# Patient Record
Sex: FEMALE | Race: WHITE | NOT HISPANIC OR LATINO | Employment: FULL TIME | URBAN - METROPOLITAN AREA
[De-identification: names, ages, dates, MRNs, and addresses within clinical notes are randomized per-mention and may not be internally consistent; named-entity substitution may affect disease eponyms.]

---

## 2018-04-26 ENCOUNTER — OFFICE VISIT (OUTPATIENT)
Dept: URGENT CARE | Facility: CLINIC | Age: 58
End: 2018-04-26
Payer: COMMERCIAL

## 2018-04-26 ENCOUNTER — APPOINTMENT (OUTPATIENT)
Dept: RADIOLOGY | Facility: CLINIC | Age: 58
End: 2018-04-26
Payer: COMMERCIAL

## 2018-04-26 VITALS
DIASTOLIC BLOOD PRESSURE: 85 MMHG | HEART RATE: 110 BPM | TEMPERATURE: 98.1 F | OXYGEN SATURATION: 97 % | RESPIRATION RATE: 18 BRPM | SYSTOLIC BLOOD PRESSURE: 138 MMHG | BODY MASS INDEX: 50.02 KG/M2 | WEIGHT: 293 LBS | HEIGHT: 64 IN

## 2018-04-26 DIAGNOSIS — M54.6 ACUTE THORACIC BACK PAIN, UNSPECIFIED BACK PAIN LATERALITY: ICD-10-CM

## 2018-04-26 DIAGNOSIS — M54.6 ACUTE THORACIC BACK PAIN, UNSPECIFIED BACK PAIN LATERALITY: Primary | ICD-10-CM

## 2018-04-26 PROCEDURE — 99203 OFFICE O/P NEW LOW 30 MIN: CPT

## 2018-04-26 PROCEDURE — 72072 X-RAY EXAM THORAC SPINE 3VWS: CPT

## 2018-04-26 RX ORDER — METOPROLOL TARTRATE 100 MG/1
25 TABLET ORAL DAILY
Status: ON HOLD | COMMUNITY
End: 2019-11-22 | Stop reason: DRUGHIGH

## 2018-04-26 RX ORDER — CYCLOBENZAPRINE HCL 10 MG
10 TABLET ORAL
Qty: 10 TABLET | Refills: 0 | Status: SHIPPED | OUTPATIENT
Start: 2018-04-26 | End: 2019-11-21

## 2018-04-26 RX ORDER — MELOXICAM 15 MG/1
15 TABLET ORAL DAILY
Qty: 21 TABLET | Refills: 0 | Status: SHIPPED | OUTPATIENT
Start: 2018-04-26 | End: 2019-11-21

## 2018-04-26 RX ORDER — LISINOPRIL 10 MG/1
10 TABLET ORAL DAILY
COMMUNITY
End: 2019-11-21

## 2018-04-26 RX ORDER — SIMVASTATIN 20 MG
20 TABLET ORAL
COMMUNITY

## 2018-04-26 RX ORDER — ASPIRIN 81 MG/1
81 TABLET ORAL DAILY
COMMUNITY

## 2018-04-26 NOTE — PATIENT INSTRUCTIONS
Back Pain   WHAT YOU NEED TO KNOW:   Back pain is common  It can be caused by many conditions, such as arthritis or the breakdown of spinal discs  Your risk for back pain is increased by injuries, lack of activity, or repeated bending and twisting  You may feel sore or stiff on one or both sides of your back  The pain may spread to your buttocks or thighs  DISCHARGE INSTRUCTIONS:   Medicines:   · NSAIDs  help decrease swelling and pain  This medicine is available with or without a doctor's order  NSAIDs can cause stomach bleeding or kidney problems in certain people  If you take blood thinner medicine, always ask your healthcare provider if NSAIDs are safe for you  Always read the medicine label and follow directions  · Acetaminophen  decreases pain  It is available without a doctor's order  Ask how much to take and how often to take it  Follow directions  Acetaminophen can cause liver damage if not taken correctly  · Prescription pain medicine  may be given  Ask your healthcare provider how to take this medicine safely  · Take your medicine as directed  Contact your healthcare provider if you think your medicine is not helping or if you have side effects  Tell him or her if you are allergic to any medicine  Keep a list of the medicines, vitamins, and herbs you take  Include the amounts, and when and why you take them  Bring the list or the pill bottles to follow-up visits  Carry your medicine list with you in case of an emergency  Follow up with your healthcare provider in 2 weeks, or as directed:  Write down your questions so you remember to ask them during your visits  How to manage your back pain:   · Apply ice  on your back or affected area for 15 to 20 minutes every hour or as directed  Use an ice pack, or put crushed ice in a plastic bag  Cover it with a towel  Ice helps prevent tissue damage and decreases pain      · Apply heat  on your back or affected area for 20 to 30 minutes every 2 hours for as many days as directed  Heat helps decrease pain and muscle spasms  · Stay active  as much as you can without causing more pain  Bed rest could make your back pain worse  Avoid heavy lifting until your pain is gone  Return to the emergency department if:   · You have pain, numbness, or weakness in one or both legs  · Your pain becomes so severe that you cannot walk  · You cannot control your urine or bowel movements  · You have severe back pain with chest pain  · You have severe back pain, nausea, and vomiting  · You have severe back pain that spreads to your side or genital area  Contact your healthcare provider if:   · You have back pain that does not get better with rest and pain medicine  · You have a fever  · You have pain that worsens when you are on your back or when you rest     · You have pain that worsens when you cough or sneeze  · You lose weight without trying  · You have questions or concerns about your condition or care  © 2017 [employer] Information is for End User's use only and may not be sold, redistributed or otherwise used for commercial purposes  All illustrations and images included in CareNotes® are the copyrighted property of [facility] [employer]  or [employer] Spe[employer]  The above information is an  only  It is not intended as medical advice for individual conditions or treatments  Talk to your doctor, nurse or pharmacist before following any medical regimen to see if it is safe and effective for you

## 2018-04-26 NOTE — PROGRESS NOTES
[facility]        NAME: Nicole Alan is a 62 y o  female  : 1960    MRN: [id number]  DATE: 2018  TIME: 8:11 AM    Assessment and Plan   Acute thoracic back pain, unspecified back pain laterality [M54 6]  1  Acute thoracic back pain, unspecified back pain laterality  XR spine thoracic 3 vw    XR spine lumbar 2 or 3 views injury    meloxicam (MOBIC) 15 mg tablet    cyclobenzaprine (FLEXERIL) 10 mg tablet         Patient Instructions       Follow up with PCP in 3-5 days  Proceed to  ER if symptoms worsen  Chief Complaint     Chief Complaint   Patient presents with    Back Pain     stated elevator closed on her at work on 18  has back pain since  History of Present Illness       Back Pain   This is a new problem  The current episode started today  The problem occurs constantly  The problem is unchanged  The pain is present in the thoracic spine  The quality of the pain is described as aching  The pain does not radiate  The pain is mild  The symptoms are aggravated by twisting and bending  Pertinent negatives include no abdominal pain, bladder incontinence, bowel incontinence, chest pain, dysuria, fever, headaches, leg pain, numbness, paresis, paresthesias, pelvic pain, perianal numbness, tingling, weakness or weight loss  Pt had an elevator door hit x2 against her back  Review of Systems   Review of Systems   Constitutional: Negative for fever and weight loss  Cardiovascular: Negative for chest pain  Gastrointestinal: Negative for abdominal pain and bowel incontinence  Genitourinary: Negative for bladder incontinence, dysuria and pelvic pain  Musculoskeletal: Positive for back pain  Neurological: Negative for tingling, weakness, numbness, headaches and paresthesias  All other systems reviewed and are negative          Current Medications       Current Outpatient Prescriptions:     aspirin (ECOTRIN LOW STRENGTH) 81 mg EC tablet, Take 81 mg by mouth daily, Disp: , Rfl:     Exenatide ER (BYDUREON) 2 MG PEN, Inject 2 mg under the skin once a week, Disp: , Rfl:     lisinopril (ZESTRIL) 10 mg tablet, Take 10 mg by mouth daily, Disp: , Rfl:     metFORMIN (GLUCOPHAGE) 500 mg tablet, Take 500 mg by mouth daily, Disp: , Rfl:     metoprolol tartrate (LOPRESSOR) 100 mg tablet, Take 25 mg by mouth daily, Disp: , Rfl:     simvastatin (ZOCOR) 20 mg tablet, Take 20 mg by mouth daily at bedtime, Disp: , Rfl:     cyclobenzaprine (FLEXERIL) 10 mg tablet, Take 1 tablet (10 mg total) by mouth daily at bedtime, Disp: 10 tablet, Rfl: 0    meloxicam (MOBIC) 15 mg tablet, Take 1 tablet (15 mg total) by mouth daily, Disp: 21 tablet, Rfl: 0    Current Allergies     Allergies as of 04/26/2018 - Reviewed 04/26/2018   Allergen Reaction Noted    Zithromax [azithromycin] Abdominal Pain 04/26/2018    Penicillins Rash 04/26/2018            The following portions of the patient's history were reviewed and updated as appropriate: allergies, current medications, past family history, past medical history, past social history, past surgical history and problem list      Past Medical History:   Diagnosis Date    Diabetes mellitus ([address] )     High cholesterol     Hypertension        Past Surgical History:   Procedure Laterality Date    BOWEL RESECTION      HERNIA REPAIR         No family history on file  Medications have been verified  Objective   /85   Pulse (!) 110   Temp 98 1 °F (36 7 °C) (Tympanic)   Resp 18   Ht 5' 4" (1 626 m)   Wt (!) 145 kg (320 lb)   SpO2 97%   BMI 54 93 kg/m²        Physical Exam     Physical Exam   Constitutional: She appears well-developed and well-nourished  Cardiovascular: Normal rate  Pulmonary/Chest: Effort normal and breath sounds normal    Musculoskeletal: Normal range of motion  She exhibits tenderness (mild tenderness T7-12)  Nursing note and vitals reviewed        Mild spasm along T7-12  Remainder of exam neg

## 2019-05-01 ENCOUNTER — TELEPHONE (OUTPATIENT)
Dept: FAMILY MEDICINE CLINIC | Facility: CLINIC | Age: 59
End: 2019-05-01

## 2019-11-21 ENCOUNTER — HOSPITAL ENCOUNTER (INPATIENT)
Facility: HOSPITAL | Age: 59
LOS: 4 days | Discharge: HOME HEALTH | DRG: 908 | End: 2019-11-26
Attending: EMERGENCY MEDICINE | Admitting: FAMILY MEDICINE
Payer: COMMERCIAL

## 2019-11-21 DIAGNOSIS — L02.211 CUTANEOUS ABSCESS OF ABDOMINAL WALL: ICD-10-CM

## 2019-11-21 DIAGNOSIS — T81.49XA ABDOMINAL WALL ABSCESS AT SITE OF SURGICAL WOUND: Primary | ICD-10-CM

## 2019-11-21 PROCEDURE — 84145 PROCALCITONIN (PCT): CPT | Performed by: EMERGENCY MEDICINE

## 2019-11-21 PROCEDURE — 99285 EMERGENCY DEPT VISIT HI MDM: CPT

## 2019-11-21 PROCEDURE — 99285 EMERGENCY DEPT VISIT HI MDM: CPT | Performed by: EMERGENCY MEDICINE

## 2019-11-21 PROCEDURE — 83036 HEMOGLOBIN GLYCOSYLATED A1C: CPT | Performed by: NURSE PRACTITIONER

## 2019-11-21 PROCEDURE — 87205 SMEAR GRAM STAIN: CPT | Performed by: EMERGENCY MEDICINE

## 2019-11-21 PROCEDURE — 87040 BLOOD CULTURE FOR BACTERIA: CPT | Performed by: EMERGENCY MEDICINE

## 2019-11-21 PROCEDURE — 87070 CULTURE OTHR SPECIMN AEROBIC: CPT | Performed by: EMERGENCY MEDICINE

## 2019-11-21 PROCEDURE — 87086 URINE CULTURE/COLONY COUNT: CPT | Performed by: EMERGENCY MEDICINE

## 2019-11-21 PROCEDURE — 87186 SC STD MICRODIL/AGAR DIL: CPT | Performed by: EMERGENCY MEDICINE

## 2019-11-21 PROCEDURE — 81003 URINALYSIS AUTO W/O SCOPE: CPT | Performed by: EMERGENCY MEDICINE

## 2019-11-21 PROCEDURE — 36415 COLL VENOUS BLD VENIPUNCTURE: CPT | Performed by: EMERGENCY MEDICINE

## 2019-11-21 PROCEDURE — 80053 COMPREHEN METABOLIC PANEL: CPT | Performed by: EMERGENCY MEDICINE

## 2019-11-21 PROCEDURE — 85025 COMPLETE CBC W/AUTO DIFF WBC: CPT | Performed by: EMERGENCY MEDICINE

## 2019-11-21 PROCEDURE — 83605 ASSAY OF LACTIC ACID: CPT | Performed by: EMERGENCY MEDICINE

## 2019-11-21 RX ORDER — PAROXETINE 10 MG/1
10 TABLET, FILM COATED ORAL DAILY
COMMUNITY

## 2019-11-22 ENCOUNTER — APPOINTMENT (EMERGENCY)
Dept: CT IMAGING | Facility: HOSPITAL | Age: 59
DRG: 908 | End: 2019-11-22
Payer: COMMERCIAL

## 2019-11-22 ENCOUNTER — TRANSCRIBE ORDERS (OUTPATIENT)
Dept: LAB | Facility: HOSPITAL | Age: 59
End: 2019-11-22

## 2019-11-22 PROBLEM — L02.211 CUTANEOUS ABSCESS OF ABDOMINAL WALL: Status: ACTIVE | Noted: 2019-11-22

## 2019-11-22 LAB
ALBUMIN SERPL BCP-MCNC: 3.8 G/DL (ref 3.5–5)
ALP SERPL-CCNC: 121 U/L (ref 46–116)
ALT SERPL W P-5'-P-CCNC: 18 U/L (ref 12–78)
ANION GAP SERPL CALCULATED.3IONS-SCNC: 10 MMOL/L (ref 4–13)
ANION GAP SERPL CALCULATED.3IONS-SCNC: 11 MMOL/L (ref 4–13)
AST SERPL W P-5'-P-CCNC: 12 U/L (ref 5–45)
BASOPHILS # BLD AUTO: 0.05 THOUSANDS/ΜL (ref 0–0.1)
BASOPHILS NFR BLD AUTO: 0 % (ref 0–1)
BILIRUB SERPL-MCNC: 0.55 MG/DL (ref 0.2–1)
BILIRUB UR QL STRIP: NEGATIVE
BUN SERPL-MCNC: 15 MG/DL (ref 5–25)
BUN SERPL-MCNC: 18 MG/DL (ref 5–25)
CALCIUM SERPL-MCNC: 9 MG/DL (ref 8.3–10.1)
CALCIUM SERPL-MCNC: 9.5 MG/DL (ref 8.3–10.1)
CHLORIDE SERPL-SCNC: 100 MMOL/L (ref 100–108)
CHLORIDE SERPL-SCNC: 104 MMOL/L (ref 100–108)
CLARITY UR: CLEAR
CO2 SERPL-SCNC: 25 MMOL/L (ref 21–32)
CO2 SERPL-SCNC: 28 MMOL/L (ref 21–32)
COLOR UR: YELLOW
CREAT SERPL-MCNC: 0.56 MG/DL (ref 0.6–1.3)
CREAT SERPL-MCNC: 0.61 MG/DL (ref 0.6–1.3)
EOSINOPHIL # BLD AUTO: 0.1 THOUSAND/ΜL (ref 0–0.61)
EOSINOPHIL NFR BLD AUTO: 1 % (ref 0–6)
ERYTHROCYTE [DISTWIDTH] IN BLOOD BY AUTOMATED COUNT: 14.2 % (ref 11.6–15.1)
ERYTHROCYTE [DISTWIDTH] IN BLOOD BY AUTOMATED COUNT: 14.3 % (ref 11.6–15.1)
EST. AVERAGE GLUCOSE BLD GHB EST-MCNC: 174 MG/DL
GFR SERPL CREATININE-BSD FRML MDRD: 100 ML/MIN/1.73SQ M
GFR SERPL CREATININE-BSD FRML MDRD: 102 ML/MIN/1.73SQ M
GLUCOSE SERPL-MCNC: 115 MG/DL (ref 65–140)
GLUCOSE SERPL-MCNC: 136 MG/DL (ref 65–140)
GLUCOSE SERPL-MCNC: 143 MG/DL (ref 65–140)
GLUCOSE SERPL-MCNC: 148 MG/DL (ref 65–140)
GLUCOSE SERPL-MCNC: 154 MG/DL (ref 65–140)
GLUCOSE SERPL-MCNC: 254 MG/DL (ref 65–140)
GLUCOSE UR STRIP-MCNC: NEGATIVE MG/DL
HBA1C MFR BLD: 7.7 % (ref 4.2–6.3)
HCT VFR BLD AUTO: 29.4 % (ref 34.8–46.1)
HCT VFR BLD AUTO: 35.9 % (ref 34.8–46.1)
HGB BLD-MCNC: 11.2 G/DL (ref 11.5–15.4)
HGB BLD-MCNC: 9.4 G/DL (ref 11.5–15.4)
HGB UR QL STRIP.AUTO: NEGATIVE
IMM GRANULOCYTES # BLD AUTO: 0.09 THOUSAND/UL (ref 0–0.2)
IMM GRANULOCYTES NFR BLD AUTO: 1 % (ref 0–2)
KETONES UR STRIP-MCNC: NEGATIVE MG/DL
LACTATE SERPL-SCNC: 1.4 MMOL/L (ref 0.5–2)
LEUKOCYTE ESTERASE UR QL STRIP: NEGATIVE
LYMPHOCYTES # BLD AUTO: 3.09 THOUSANDS/ΜL (ref 0.6–4.47)
LYMPHOCYTES NFR BLD AUTO: 23 % (ref 14–44)
MAGNESIUM SERPL-MCNC: 1.7 MG/DL (ref 1.6–2.6)
MCH RBC QN AUTO: 26.2 PG (ref 26.8–34.3)
MCH RBC QN AUTO: 26.2 PG (ref 26.8–34.3)
MCHC RBC AUTO-ENTMCNC: 31.2 G/DL (ref 31.4–37.4)
MCHC RBC AUTO-ENTMCNC: 32 G/DL (ref 31.4–37.4)
MCV RBC AUTO: 82 FL (ref 82–98)
MCV RBC AUTO: 84 FL (ref 82–98)
MONOCYTES # BLD AUTO: 1.09 THOUSAND/ΜL (ref 0.17–1.22)
MONOCYTES NFR BLD AUTO: 8 % (ref 4–12)
NEUTROPHILS # BLD AUTO: 9.26 THOUSANDS/ΜL (ref 1.85–7.62)
NEUTS SEG NFR BLD AUTO: 67 % (ref 43–75)
NITRITE UR QL STRIP: NEGATIVE
NRBC BLD AUTO-RTO: 0 /100 WBCS
PH UR STRIP.AUTO: 5 [PH]
PHOSPHATE SERPL-MCNC: 3.8 MG/DL (ref 2.7–4.5)
PLATELET # BLD AUTO: 325 THOUSANDS/UL (ref 149–390)
PLATELET # BLD AUTO: 452 THOUSANDS/UL (ref 149–390)
PMV BLD AUTO: 8.4 FL (ref 8.9–12.7)
PMV BLD AUTO: 8.8 FL (ref 8.9–12.7)
POTASSIUM SERPL-SCNC: 4 MMOL/L (ref 3.5–5.3)
POTASSIUM SERPL-SCNC: 4.1 MMOL/L (ref 3.5–5.3)
PROCALCITONIN SERPL-MCNC: <0.05 NG/ML
PROT SERPL-MCNC: 7.6 G/DL (ref 6.4–8.2)
PROT UR STRIP-MCNC: NEGATIVE MG/DL
RBC # BLD AUTO: 3.59 MILLION/UL (ref 3.81–5.12)
RBC # BLD AUTO: 4.28 MILLION/UL (ref 3.81–5.12)
SODIUM SERPL-SCNC: 139 MMOL/L (ref 136–145)
SODIUM SERPL-SCNC: 139 MMOL/L (ref 136–145)
SP GR UR STRIP.AUTO: 1.02 (ref 1–1.03)
UROBILINOGEN UR QL STRIP.AUTO: 0.2 E.U./DL
WBC # BLD AUTO: 10.43 THOUSAND/UL (ref 4.31–10.16)
WBC # BLD AUTO: 13.68 THOUSAND/UL (ref 4.31–10.16)

## 2019-11-22 PROCEDURE — 83735 ASSAY OF MAGNESIUM: CPT | Performed by: NURSE PRACTITIONER

## 2019-11-22 PROCEDURE — 99223 1ST HOSP IP/OBS HIGH 75: CPT | Performed by: FAMILY MEDICINE

## 2019-11-22 PROCEDURE — 93005 ELECTROCARDIOGRAM TRACING: CPT

## 2019-11-22 PROCEDURE — 85027 COMPLETE CBC AUTOMATED: CPT | Performed by: NURSE PRACTITIONER

## 2019-11-22 PROCEDURE — 82948 REAGENT STRIP/BLOOD GLUCOSE: CPT

## 2019-11-22 PROCEDURE — 84100 ASSAY OF PHOSPHORUS: CPT | Performed by: NURSE PRACTITIONER

## 2019-11-22 PROCEDURE — 80048 BASIC METABOLIC PNL TOTAL CA: CPT | Performed by: NURSE PRACTITIONER

## 2019-11-22 PROCEDURE — 74177 CT ABD & PELVIS W/CONTRAST: CPT

## 2019-11-22 RX ORDER — PAROXETINE HYDROCHLORIDE 20 MG/1
10 TABLET, FILM COATED ORAL DAILY
Status: DISCONTINUED | OUTPATIENT
Start: 2019-11-22 | End: 2019-11-26 | Stop reason: HOSPADM

## 2019-11-22 RX ORDER — SODIUM CHLORIDE 9 MG/ML
75 INJECTION, SOLUTION INTRAVENOUS CONTINUOUS
Status: DISCONTINUED | OUTPATIENT
Start: 2019-11-22 | End: 2019-11-23

## 2019-11-22 RX ORDER — METOPROLOL SUCCINATE 25 MG/1
100 TABLET, EXTENDED RELEASE ORAL DAILY
Status: DISCONTINUED | OUTPATIENT
Start: 2019-11-22 | End: 2019-11-26 | Stop reason: HOSPADM

## 2019-11-22 RX ORDER — ACETAMINOPHEN 325 MG/1
650 TABLET ORAL EVERY 6 HOURS PRN
Status: DISCONTINUED | OUTPATIENT
Start: 2019-11-22 | End: 2019-11-26 | Stop reason: HOSPADM

## 2019-11-22 RX ORDER — PRAVASTATIN SODIUM 20 MG
20 TABLET ORAL
Status: DISCONTINUED | OUTPATIENT
Start: 2019-11-22 | End: 2019-11-26 | Stop reason: HOSPADM

## 2019-11-22 RX ORDER — ONDANSETRON 2 MG/ML
4 INJECTION INTRAMUSCULAR; INTRAVENOUS EVERY 6 HOURS PRN
Status: DISCONTINUED | OUTPATIENT
Start: 2019-11-22 | End: 2019-11-26 | Stop reason: HOSPADM

## 2019-11-22 RX ORDER — HYDRALAZINE HYDROCHLORIDE 20 MG/ML
5 INJECTION INTRAMUSCULAR; INTRAVENOUS EVERY 6 HOURS PRN
Status: DISCONTINUED | OUTPATIENT
Start: 2019-11-22 | End: 2019-11-26 | Stop reason: HOSPADM

## 2019-11-22 RX ORDER — METOPROLOL TARTRATE 50 MG/1
25 TABLET, FILM COATED ORAL DAILY
Status: DISCONTINUED | OUTPATIENT
Start: 2019-11-22 | End: 2019-11-22

## 2019-11-22 RX ORDER — METOPROLOL SUCCINATE 100 MG/1
100 TABLET, EXTENDED RELEASE ORAL DAILY
COMMUNITY
Start: 2019-10-01

## 2019-11-22 RX ADMIN — VANCOMYCIN HYDROCHLORIDE 1750 MG: 1 INJECTION, POWDER, LYOPHILIZED, FOR SOLUTION INTRAVENOUS at 17:32

## 2019-11-22 RX ADMIN — METOPROLOL SUCCINATE 100 MG: 25 TABLET, EXTENDED RELEASE ORAL at 09:01

## 2019-11-22 RX ADMIN — METRONIDAZOLE 500 MG: 500 INJECTION, SOLUTION INTRAVENOUS at 19:46

## 2019-11-22 RX ADMIN — INSULIN LISPRO 2 UNITS: 100 INJECTION, SOLUTION INTRAVENOUS; SUBCUTANEOUS at 16:51

## 2019-11-22 RX ADMIN — VANCOMYCIN HYDROCHLORIDE 1750 MG: 1 INJECTION, POWDER, LYOPHILIZED, FOR SOLUTION INTRAVENOUS at 07:36

## 2019-11-22 RX ADMIN — SODIUM CHLORIDE 75 ML/HR: 0.9 INJECTION, SOLUTION INTRAVENOUS at 04:16

## 2019-11-22 RX ADMIN — PRAVASTATIN SODIUM 20 MG: 20 TABLET ORAL at 16:51

## 2019-11-22 RX ADMIN — ACETAMINOPHEN 650 MG: 325 TABLET, FILM COATED ORAL at 03:57

## 2019-11-22 RX ADMIN — Medication 2000 MG: at 12:47

## 2019-11-22 RX ADMIN — Medication 2000 MG: at 20:27

## 2019-11-22 RX ADMIN — Medication 2000 MG: at 05:41

## 2019-11-22 RX ADMIN — IOHEXOL 100 ML: 350 INJECTION, SOLUTION INTRAVENOUS at 00:52

## 2019-11-22 RX ADMIN — METRONIDAZOLE 500 MG: 500 INJECTION, SOLUTION INTRAVENOUS at 05:25

## 2019-11-22 RX ADMIN — PAROXETINE 10 MG: 20 TABLET, FILM COATED ORAL at 09:02

## 2019-11-22 RX ADMIN — METRONIDAZOLE 500 MG: 500 INJECTION, SOLUTION INTRAVENOUS at 11:43

## 2019-11-22 NOTE — PROGRESS NOTES
Vancomycin Assessment    Pb Mohr is a 61 y o  female who is currently receiving vancomycin 2750 mg iv q 12hours for skin-soft tissue infection, other abdominal abscess with possible enterocutaneus fistula   Relevant clinical data and objective history reviewed:  Creatinine   Date Value Ref Range Status   11/21/2019 0 61 0 60 - 1 30 mg/dL Final     Comment:     Standardized to IDMS reference method     BP (!) 173/84 (BP Location: Right arm)   Pulse 86   Temp 98 1 °F (36 7 °C) (Oral)   Resp 18   Ht 5' 7" (1 702 m)   Wt (!) 138 kg (303 lb 6 4 oz)   SpO2 96%   BMI 47 52 kg/m²   No intake/output data recorded  Lab Results   Component Value Date/Time    BUN 18 11/21/2019 11:34 PM    WBC 13 68 (H) 11/21/2019 11:34 PM    HGB 11 2 (L) 11/21/2019 11:34 PM    HCT 35 9 11/21/2019 11:34 PM    MCV 84 11/21/2019 11:34 PM     (H) 11/21/2019 11:34 PM     Temp Readings from Last 3 Encounters:   11/22/19 98 1 °F (36 7 °C) (Oral)   04/26/18 98 1 °F (36 7 °C) (Tympanic)     Vancomycin Days of Therapy: 1    Assessment/Plan  The patient is currently on vancomycin utilizing scheduled dosing based on adjusted body weight (due to obesity)  Baseline risks associated with therapy include: concomitant nephrotoxic medications and dehydration  The patient is currently receiving 2750 mg iv q 12hours and after clinical evaluation will be changed to 1750 mg iv q 12 hours  Pharmacy will also follow closely for s/sx of nephrotoxicity, infusion reactions and appropriateness of therapy  BMP and CBC will be ordered per protocol  Plan for trough as patient approaches steady state, prior to the 4th  dose at approximately 17:30 on 11/23/2019  Due to infection severity, will target a trough of 15-20 (appropriate for most indications)   Pharmacy will continue to follow the patients culture results and clinical progress daily      Diego Mathew, Pharmacist

## 2019-11-22 NOTE — CONSULTS
Consultation - General Surgery   Gail Carrel 61 y o  female MRN: [id number]  Unit/Bed#: -01 Encounter: [id number]    Assessment/Plan     Assessment:  - Draining abdominal wound, Imaging shows concern for "fluid and air collection measuring 5 2 x 3 2 x 4 6 cm with adjacent fat stranding and skin thickening likely due to an abscess with overlying cellulitis"  Concern for enterocutaneous fistula  - Leukocytosis (WBC 13 86) on admission, now 10 43  - Lactic acid 1 4  - Gram stain showing 2+ polys and 1+ GNR  - Morbid obesity  - Diabetes mellitus    Plan:  - No acute surgical intervention, once acute infection has resolved/improved the patient should follow with her primary surgeon as previously planned  - Continue antibiotics per primary service  - Monitor qAM labs to include CBC and BMP  - IVF hydration  - Monitor I/Os  - Follow blood and wound cultures  - Medicate PRN pain  - Dry dressing change to wound daily and PRN  - Recommend dietary changes to promote weight loss and glucose control      History of Present Illness     HPI:  Gail Carrel is a 61 y o  female who presents with 3 days of worsening abdominal wall tenderness, erythema and drainage from a chronic wound  She states that she has been diagnosed with multiple ventral hernias  She has history of multiple abdominal surgeries including laparoscopic cholecystectomy, bowel resection  Her surgeon was planning to send her to [facility] to undergo an abdominal wall reconstruction due to loss of domain  Before this could be arranged, she suffered a spider bite to the periumbilical area in June of 2019  The bite becam infected and she was admitted to [facility] in [address] [place] [address] with a diagnosis of sepsis  Her abscess was surgically drained by General Surgeon at that time and she did well postoperatively for approximately 6 weeks  At that time she noticed a small draining area with clear drainage without foul odor   She was last seen by her general surgeon approximately 2 months ago  She saw her PCP, Dr Yanet Durham, on 11/19  At that time he advised her to be seen by her surgeon  She did not schedule an appointment at that time  Since that visit her abdomen has become more tender, erythematous, and the discharge is now copious and purulent  She states she may have been feverish on one occasion but she is not sure  She denies bowel or bladder complaints  She has been tolerating PO intake without N/V  She presented to the [facility] ED last evening  Workup in the ED revealed a leukocytosis (WBC 13 86), and CT scan of the abdomen/pelvis revealed fluid collection, abdominal wall cellulitis, and possible enterocutaneous fistula  Inpatient consult to Acute Care Surgery  Consult performed by: Casimiro Lao PA-C  Consult ordered by: YAMILE Davidson        Review of Systems   Constitutional: Negative for chills, diaphoresis and fatigue  Subjective fever on one occassion   HENT: Negative  Eyes: Negative  Respiratory: Negative  Cardiovascular: Negative  Gastrointestinal: Negative for abdominal distention, blood in stool, constipation, nausea and vomiting  Endocrine: Negative  Genitourinary: Negative  Musculoskeletal: Negative  Skin: Negative  5 months with a draining wound in periumbilical region  Allergic/Immunologic: Negative  Neurological: Negative  Hematological: Negative  Psychiatric/Behavioral: Negative  All other systems reviewed and are negative        Historical Information   Past Medical History:   Diagnosis Date    Diabetes mellitus ([address] )     High cholesterol     Hypertension     Psychiatric disorder      Past Surgical History:   Procedure Laterality Date    BOWEL RESECTION      CHOLECYSTECTOMY LAPAROSCOPIC      HERNIA REPAIR       Social History   Social History     Substance and Sexual Activity   Alcohol Use Yes    Comment: rarely     Social History     Substance and Sexual Activity   Drug Use Never     Social History     Tobacco Use   Smoking Status Never Smoker   Smokeless Tobacco Never Used     Family History: non-contributory    Meds/Allergies   current meds:   Current Facility-Administered Medications   Medication Dose Route Frequency    acetaminophen (TYLENOL) tablet 650 mg  650 mg Oral Q6H PRN    vancomycin (VANCOCIN) 1,750 mg in sodium chloride 0 9 % 500 mL IVPB  20 mg/kg (Adjusted) Intravenous Q12H    And    cefepime (MAXIPIME) 2,000 mg in dextrose 5 % 50 mL IVPB  2,000 mg Intravenous Q8H    hydrALAZINE (APRESOLINE) injection 5 mg  5 mg Intravenous Q6H PRN    insulin lispro (HumaLOG) 100 units/mL subcutaneous injection 1-5 Units  1-5 Units Subcutaneous TID AC    insulin lispro (HumaLOG) 100 units/mL subcutaneous injection 1-5 Units  1-5 Units Subcutaneous HS    metoprolol succinate (TOPROL-XL) 24 hr tablet 100 mg  100 mg Oral Daily    metroNIDAZOLE (FLAGYL) IVPB (premix) 500 mg  500 mg Intravenous Q8H    ondansetron (ZOFRAN) injection 4 mg  4 mg Intravenous Q6H PRN    PARoxetine (PAXIL) tablet 10 mg  10 mg Oral Daily    pravastatin (PRAVACHOL) tablet 20 mg  20 mg Oral Daily With Dinner    sodium chloride 0 9 % infusion  75 mL/hr Intravenous Continuous     Allergies   Allergen Reactions    Zithromax [Azithromycin] Abdominal Pain    Penicillins Rash       Objective   First Vitals:   Blood Pressure: (!) 229/100 (11/21/19 2321)  Pulse: 90 (11/21/19 2321)  Temperature: 98 1 °F (36 7 °C) (11/21/19 2332)  Temp Source: Oral (11/21/19 2332)  Respirations: 20 (11/21/19 2321)  Height: 5' 7" (170 2 cm) (11/22/19 0204)  Weight - Scale: (!) 138 kg (303 lb 6 4 oz) (11/22/19 0045)  SpO2: 99 % (11/21/19 2320)    Current Vitals:   Blood Pressure: 147/73 (11/22/19 0717)  Pulse: 90 (11/22/19 0716)  Temperature: 98 2 °F (36 8 °C) (11/22/19 0716)  Temp Source: Oral (11/22/19 0716)  Respirations: 18 (11/22/19 3416)  Height: 5' 7" (170 2 cm) (11/22/19 6334)  Weight - Scale: (!) 138 kg (303 lb 6 4 oz) (11/22/19 0204)  SpO2: 96 % (11/22/19 0716)      Intake/Output Summary (Last 24 hours) at 11/22/2019 1310  Last data filed at 11/22/2019 1306  Gross per 24 hour   Intake 278 34 ml   Output --   Net 278 34 ml       Invasive Devices     Peripheral Intravenous Line            Peripheral IV 11/21/19 Left Antecubital less than 1 day    Peripheral IV 11/21/19 Right Antecubital less than 1 day                Physical Exam   Constitutional: She is oriented to person, place, and time  No distress  Morbidly obese female   HENT:   Head: Normocephalic and atraumatic  Right Ear: External ear normal    Left Ear: External ear normal    Mouth/Throat: Oropharynx is clear and moist    Eyes: Pupils are equal, round, and reactive to light  Conjunctivae and EOM are normal  Right eye exhibits no discharge  Left eye exhibits no discharge  Neck: Normal range of motion  Neck supple  Cardiovascular: Normal rate, regular rhythm, normal heart sounds and intact distal pulses  Pulmonary/Chest: Effort normal and breath sounds normal  No respiratory distress  Abdominal: Soft  Bowel sounds are normal  She exhibits no distension  There is no guarding  Draining erythematous periumbilical wound, purulent drainage present  minimal tenderness to touch  Multiple ventral hernias present and not reducible  Musculoskeletal: Normal range of motion  Neurological: She is alert and oriented to person, place, and time  No cranial nerve deficit  Skin: Skin is warm and dry  Capillary refill takes less than 2 seconds  She is not diaphoretic  Psychiatric: She has a normal mood and affect  Her behavior is normal  Judgment and thought content normal    Vitals reviewed  Lab Results:   I have personally reviewed pertinent lab results      CBC:   Lab Results   Component Value Date    WBC 10 43 (H) 11/22/2019    HGB 9 4 (L) 11/22/2019    HCT 29 4 (L) 11/22/2019    MCV 82 11/22/2019     11/22/2019    MCH 26 2 (L) 11/22/2019    MCHC 32 0 11/22/2019    RDW 14 2 11/22/2019    MPV 8 4 (L) 11/22/2019    NRBC 0 11/21/2019     CMP:   Lab Results   Component Value Date    SODIUM 139 11/22/2019    K 4 0 11/22/2019     11/22/2019    CO2 25 11/22/2019    BUN 15 11/22/2019    CREATININE 0 56 (L) 11/22/2019    CALCIUM 9 0 11/22/2019    AST 12 11/21/2019    ALT 18 11/21/2019    ALKPHOS 121 (H) 11/21/2019    EGFR 102 11/22/2019         Imaging: I have personally reviewed pertinent reports  EKG, Pathology, and Other Studies: I have personally reviewed pertinent reports  Counseling / Coordination of Care  Total floor / unit time spent today 40 minutes  Greater than 50% of total time was spent with the patient and / or family counseling and / or coordination of care  A description of the counseling / coordination of care: etiology of condition, review of imaging results, lab results, and treatment options        Judi Kapadai PA-C

## 2019-11-22 NOTE — PLAN OF CARE
Problem: PAIN - ADULT  Goal: Verbalizes/displays adequate comfort level or baseline comfort level  Description  Interventions:  - Encourage patient to monitor pain and request assistance  - Assess pain using appropriate pain scale  - Administer analgesics based on type and severity of pain and evaluate response  - Implement non-pharmacological measures as appropriate and evaluate response  - Consider cultural and social influences on pain and pain management  - Notify physician/advanced practitioner if interventions unsuccessful or patient reports new pain  Outcome: Progressing     Problem: INFECTION - ADULT  Goal: Absence or prevention of progression during hospitalization  Description  INTERVENTIONS:  - Assess and monitor for signs and symptoms of infection  - Monitor lab/diagnostic results  - Monitor all insertion sites, i e  indwelling lines, tubes, and drains  - Monitor endotracheal if appropriate and nasal secretions for changes in amount and color  - [place] appropriate cooling/warming therapies per order  - Administer medications as ordered  - Instruct and encourage patient and family to use good hand hygiene technique  - Identify and instruct in appropriate isolation precautions for identified infection/condition  Outcome: Progressing  Goal: Absence of fever/infection during neutropenic period  Description  INTERVENTIONS:  - Monitor WBC    Outcome: Progressing     Problem: SAFETY ADULT  Goal: Patient will remain free of falls  Description  INTERVENTIONS:  - Assess patient frequently for physical needs  -  Identify cognitive and physical deficits and behaviors that affect risk of falls    -  [place] fall precautions as indicated by assessment   - Educate patient/family on patient safety including physical limitations  - Instruct patient to call for assistance with activity based on assessment  - Modify environment to reduce risk of injury  - Consider OT/PT consult to assist with strengthening/mobility  Outcome: Progressing  Goal: Maintain or return to baseline ADL function  Description  INTERVENTIONS:  -  Assess patient's ability to carry out ADLs; assess patient's baseline for ADL function and identify physical deficits which impact ability to perform ADLs (bathing, care of mouth/teeth, toileting, grooming, dressing, etc )  - Assess/evaluate cause of self-care deficits   - Assess range of motion  - Assess patient's mobility; develop plan if impaired  - Assess patient's need for assistive devices and provide as appropriate  - Encourage maximum independence but intervene and supervise when necessary  - Involve family in performance of ADLs  - Assess for home care needs following discharge   - Consider OT consult to assist with ADL evaluation and planning for discharge  - Provide patient education as appropriate  Outcome: Progressing  Goal: Maintain or return mobility status to optimal level  Description  INTERVENTIONS:  - Assess patient's baseline mobility status (ambulation, transfers, stairs, etc )    - Identify cognitive and physical deficits and behaviors that affect mobility  - Identify mobility aids required to assist with transfers and/or ambulation (gait belt, sit-to-stand, lift, walker, cane, etc )  - [place] fall precautions as indicated by assessment  - Record patient progress and toleration of activity level on Mobility SBAR; progress patient to next Phase/Stage  - Instruct patient to call for assistance with activity based on assessment  - Consider rehabilitation consult to assist with strengthening/weightbearing, etc   Outcome: Progressing     Problem: SAFETY ADULT  Goal: Maintain or return to baseline ADL function  Description  INTERVENTIONS:  -  Assess patient's ability to carry out ADLs; assess patient's baseline for ADL function and identify physical deficits which impact ability to perform ADLs (bathing, care of mouth/teeth, toileting, grooming, dressing, etc )  - Assess/evaluate cause of self-care deficits   - Assess range of motion  - Assess patient's mobility; develop plan if impaired  - Assess patient's need for assistive devices and provide as appropriate  - Encourage maximum independence but intervene and supervise when necessary  - Involve family in performance of ADLs  - Assess for home care needs following discharge   - Consider OT consult to assist with ADL evaluation and planning for discharge  - Provide patient education as appropriate  Outcome: Progressing     Problem: SAFETY ADULT  Goal: Maintain or return mobility status to optimal level  Description  INTERVENTIONS:  - Assess patient's baseline mobility status (ambulation, transfers, stairs, etc )    - Identify cognitive and physical deficits and behaviors that affect mobility  - Identify mobility aids required to assist with transfers and/or ambulation (gait belt, sit-to-stand, lift, walker, cane, etc )  - [place] fall precautions as indicated by assessment  - Record patient progress and toleration of activity level on Mobility SBAR; progress patient to next Phase/Stage  - Instruct patient to call for assistance with activity based on assessment  - Consider rehabilitation consult to assist with strengthening/weightbearing, etc   Outcome: Progressing     Problem: DISCHARGE PLANNING  Goal: Discharge to home or other facility with appropriate resources  Description  INTERVENTIONS:  - Identify barriers to discharge w/patient and caregiver  - Arrange for needed discharge resources and transportation as appropriate  - Identify discharge learning needs (meds, wound care, etc )  - Arrange for interpretive services to assist at discharge as needed  - Refer to Case Management Department for coordinating discharge planning if the patient needs post-hospital services based on physician/advanced practitioner order or complex needs related to functional status, cognitive ability, or social support system  Outcome: Progressing     Problem: Knowledge Deficit  Goal: Patient/family/caregiver demonstrates understanding of disease process, treatment plan, medications, and discharge instructions  Description  Complete learning assessment and assess knowledge base    Interventions:  - Provide teaching at level of understanding  - Provide teaching via preferred learning methods  Outcome: Progressing

## 2019-11-22 NOTE — ASSESSMENT & PLAN NOTE
· History of SVT, status post ablation 5 years ago at [facility]  · Maintained on Toprol XL  · Will obtain EKG preoperatively  · Denies symptoms of palpitations

## 2019-11-22 NOTE — ASSESSMENT & PLAN NOTE
· Continue statin therapy with Pravachol 20 mg daily  · Follow-up with primary care provider regarding lipid management

## 2019-11-22 NOTE — ASSESSMENT & PLAN NOTE
· Present on admission  · History abdominal wall abscess and cellulitis June 2019 status post treatment by General surgery with formal drainage and washout at [facility]  · Presents with 5 months of discharge from small hole than abdominal wall and over the past 3 days development of erythema, edema, severe tenderness and purulent discharge  · CT- IMPRESSION:  Very large multiloculated ventral abdominal hernia containing multiple loops of nonobstructed small large bowel  Between 3 abdominal wall hernia lobules there is a subcutaneous fluid and air collection measuring 5 2 x 3 2 x 4 6 cm with adjacent fat stranding and skin thickening likely due to an abscess with overlying cellulitis        · Patient also with leukocytosis, blood and wound cultures are pending  · Trend CBC, procalcitonin, initiate broad-spectrum antibiotic coverage in this diabetic female with recurrent abdominal abscess and possible enterocutaneous fistula will utilize vancomycin, cefepime, Flagyl  · Consultation to General surgery for possible drainage of abscess later today, we will keep patient NPO

## 2019-11-22 NOTE — SOCIAL WORK
CM met with the patient at bedside to review the CM role and discuss possible dc needs  At time of interview pt is AAOx4 lives in a two story home with her   Pt was IPTA  Pt has no DME  Pt has bathroom on both floors, bedroom on second floor with full flight os steps  Pt denies any history of drug/etoh abuse, mental illness or inpatient psych admissions  Pt denies any history of SNF/Rehab or VNA  Pt does not have a POA/LW  Pt is an employee at [employer] in [address] [place] [address]  Preferred Pharmacy: [place], [place]  Contact: Corie Kerr, , [phone]  PCP: Dr Rashid Maldonado    CM reviewed d/c planning process including the following: identifying help at home, patient preference for d/c planning needs, availability of treatment team to discuss questions or concerns patient and/or family may have regarding understanding medications and recognizing signs and symptoms once discharged  CM also encouraged patient to follow up with all recommended appointments after discharge  Patient advised of importance for patient and family to participate in managing patients medical well being

## 2019-11-22 NOTE — ED PROVIDER NOTES
History  Chief Complaint   Patient presents with    Wound Infection     patient reports infected wound to abdomen  Patient is a 48 old female presenting the emergency today complaining of subjective fever wound dehiscence with active drainage to lower abdomen, reports a history of abdominal hernia repair performed in June this year, she initially had wound dehiscence which became infected, the infection cleared up and the wound closed, she now noticed some drainage when she woke up this morning with increased redness and pain to the area, she denies a cough, cold or congestion she does report some dysuria, surgery was performed [facility] in [place]          Prior to Admission Medications   Prescriptions Last Dose Informant Patient Reported? Taking? Exenatide ER (BYDUREON) 2 MG PEN Past Week at Unknown time  Yes Yes   Sig: Inject 2 mg under the skin once a week   PARoxetine (PAXIL) 10 mg tablet 11/21/2019 at Unknown time  Yes Yes   Sig: Take 10 mg by mouth daily   aspirin (ECOTRIN LOW STRENGTH) 81 mg EC tablet 11/21/2019 at Unknown time  Yes Yes   Sig: Take 81 mg by mouth daily   metFORMIN (GLUCOPHAGE) 500 mg tablet 11/21/2019 at Unknown time  Yes Yes   Sig: Take 500 mg by mouth daily   metoprolol tartrate (LOPRESSOR) 100 mg tablet 11/21/2019 at Unknown time  Yes Yes   Sig: Take 25 mg by mouth daily   simvastatin (ZOCOR) 20 mg tablet 11/21/2019 at Unknown time  Yes Yes   Sig: Take 20 mg by mouth daily at bedtime      Facility-Administered Medications: None       Past Medical History:   Diagnosis Date    Diabetes mellitus ([facility] [address] )     High cholesterol     Hypertension        Past Surgical History:   Procedure Laterality Date    BOWEL RESECTION      HERNIA REPAIR         History reviewed  No pertinent family history  I have reviewed and agree with the history as documented      Social History     Tobacco Use    Smoking status: Never Smoker    Smokeless tobacco: Never Used   Substance Use Topics    Alcohol use: Yes     Comment: rarely    Drug use: Never        Review of Systems   Constitutional: Positive for chills and fever  HENT: Negative for ear pain, hearing loss, sore throat, trouble swallowing and voice change  Eyes: Negative for pain and discharge  Respiratory: Negative for cough, shortness of breath and wheezing  Cardiovascular: Negative for chest pain and palpitations  Gastrointestinal: Negative for abdominal pain, blood in stool, constipation, diarrhea, nausea and vomiting  Genitourinary: Positive for dysuria  Negative for flank pain, frequency and hematuria  Musculoskeletal: Negative for joint swelling, neck pain and neck stiffness  Skin: Positive for wound  Negative for rash  Neurological: Negative for dizziness, seizures, syncope, facial asymmetry and headaches  Psychiatric/Behavioral: Negative for hallucinations, self-injury and suicidal ideas  All other systems reviewed and are negative  Physical Exam  Physical Exam   Constitutional: She is oriented to person, place, and time  Vital signs are normal  She appears well-developed and well-nourished  She is active  HENT:   Head: Normocephalic and atraumatic  Eyes: Pupils are equal, round, and reactive to light  Conjunctivae, EOM and lids are normal    Neck: Trachea normal and normal range of motion  Neck supple  Cardiovascular: Normal rate and regular rhythm  Pulmonary/Chest: Effort normal and breath sounds normal    Abdominal: Soft  Normal appearance and bowel sounds are normal        A small area of wound dehiscence measuring approximately 2 cm, active purulence drainage, surrounding erythema and tenderness   Musculoskeletal: Normal range of motion  Neurological: She is alert and oriented to person, place, and time  She has normal strength  No cranial nerve deficit or sensory deficit  Skin: Skin is warm and dry  Capillary refill takes less than 2 seconds  Psychiatric: She has a normal mood and affect  Her behavior is normal  Thought content normal        Vital Signs  ED Triage Vitals   Temperature Pulse Respirations Blood Pressure SpO2   11/21/19 2332 11/21/19 2321 11/21/19 2321 11/21/19 2321 11/21/19 2321   98 1 °F (36 7 °C) 90 20 (!) 229/100 99 %      Temp Source Heart Rate Source Patient Position - Orthostatic VS BP Location FiO2 (%)   11/21/19 2332 -- 11/21/19 2321 11/21/19 2321 --   Oral  Sitting Right arm       Pain Score       11/21/19 2355       5           Vitals:    11/21/19 2321 11/21/19 2355 11/22/19 0135 11/22/19 0204   BP: (!) 229/100 164/69 (!) 180/80 (!) 173/84   Pulse: 90 84 87 86   Patient Position - Orthostatic VS: Sitting Lying Lying Lying               ED Medications  Medications   acetaminophen (TYLENOL) tablet 650 mg (has no administration in time range)   metoprolol tartrate (LOPRESSOR) tablet 25 mg (has no administration in time range)   PARoxetine (PAXIL) tablet 10 mg (has no administration in time range)   pravastatin (PRAVACHOL) tablet 20 mg (has no administration in time range)   sodium chloride 0 9 % infusion (has no administration in time range)   ondansetron (ZOFRAN) injection 4 mg (has no administration in time range)   metroNIDAZOLE (FLAGYL) IVPB (premix) 500 mg (has no administration in time range)   vancomycin (VANCOCIN) 1,750 mg in sodium chloride 0 9 % 500 mL IVPB (has no administration in time range)     And   cefepime (MAXIPIME) 2,000 mg in dextrose 5 % 50 mL IVPB (has no administration in time range)   insulin lispro (HumaLOG) 100 units/mL subcutaneous injection 1-5 Units (has no administration in time range)   insulin lispro (HumaLOG) 100 units/mL subcutaneous injection 1-5 Units (has no administration in time range)   iohexol (OMNIPAQUE) 350 MG/ML injection (SINGLE-DOSE) 100 mL (100 mL Intravenous Given 11/22/19 0052)       Diagnostic Studies  Results Reviewed     Procedure Component Value Units Date/Time    Lactic acid, plasma x2 [[id number]]  (Normal) Collected: 11/21/19 2355    Lab Status:  Final result Specimen:  Blood Updated:  11/22/19 0021     LACTIC ACID 1 4 mmol/L     Narrative:       Result may be elevated if tourniquet was used during collection      Comprehensive metabolic panel [[id number]]  (Abnormal) Collected:  11/21/19 2334    Lab Status:  Final result Specimen:  Blood from Arm, Right Updated:  11/22/19 0015     Sodium 139 mmol/L      Potassium 4 1 mmol/L      Chloride 100 mmol/L      CO2 28 mmol/L      ANION GAP 11 mmol/L      BUN 18 mg/dL      Creatinine 0 61 mg/dL      Glucose 154 mg/dL      Calcium 9 5 mg/dL      AST 12 U/L      ALT 18 U/L      Alkaline Phosphatase 121 U/L      Total Protein 7 6 g/dL      Albumin 3 8 g/dL      Total Bilirubin 0 55 mg/dL      eGFR 100 ml/min/1 73sq m     Narrative:       National Kidney Disease Foundation guidelines for Chronic Kidney Disease (CKD):     Stage 1 with normal or high GFR (GFR > 90 mL/min/1 73 square meters)    Stage 2 Mild CKD (GFR = 60-89 mL/min/1 73 square meters)    Stage 3A Moderate CKD (GFR = 45-59 mL/min/1 73 square meters)    Stage 3B Moderate CKD (GFR = 30-44 mL/min/1 73 square meters)    Stage 4 Severe CKD (GFR = 15-29 mL/min/1 73 square meters)    Stage 5 End Stage CKD (GFR <15 mL/min/1 73 square meters)  Note: GFR calculation is accurate only with a steady state creatinine    CBC and differential [[id number]]  (Abnormal) Collected:  11/21/19 2334    Lab Status:  Final result Specimen:  Blood from Arm, Right Updated:  11/22/19 0013     WBC 13 68 Thousand/uL      RBC 4 28 Million/uL      Hemoglobin 11 2 g/dL      Hematocrit 35 9 %      MCV 84 fL      MCH 26 2 pg      MCHC 31 2 g/dL      RDW 14 3 %      MPV 8 8 fL      Platelets 661 Thousands/uL      nRBC 0 /100 WBCs      Neutrophils Relative 67 %      Immat GRANS % 1 %      Lymphocytes Relative 23 %      Monocytes Relative 8 %      Eosinophils Relative 1 %      Basophils Relative 0 %      Neutrophils Absolute 9 26 Thousands/µL      Immature Grans Absolute 0 09 Thousand/uL      Lymphocytes Absolute 3 09 Thousands/µL      Monocytes Absolute 1 09 Thousand/µL      Eosinophils Absolute 0 10 Thousand/µL      Basophils Absolute 0 05 Thousands/µL     UA w Reflex to Microscopic w Reflex to Culture [[id number]] Collected:  11/21/19 2345    Lab Status:  Final result Specimen:  Urine, Clean Catch Updated:  11/22/19 0003     Color, UA Yellow     Clarity, UA Clear     Specific Gravity, UA 1 025     pH, UA 5 0     Leukocytes, UA Negative     Nitrite, UA Negative     Protein, UA Negative mg/dl      Glucose, UA Negative mg/dl      Ketones, UA Negative mg/dl      Urobilinogen, UA 0 2 E U /dl      Bilirubin, UA Negative     Blood, UA Negative    Urine culture [[id number]] Collected:  11/21/19 2345    Lab Status: In process Specimen:  Urine, Clean Catch Updated:  11/21/19 2355    Procalcitonin [[id number]] Collected:  11/21/19 2334    Lab Status: In process Specimen:  Blood from Arm, Right Updated:  11/21/19 2355    Blood culture #1 [[id number]] Collected:  11/21/19 2342    Lab Status: In process Specimen:  Blood from Arm, Left Updated:  11/21/19 2352    Blood culture #2 [[id number]] Collected:  11/21/19 2334    Lab Status: In process Specimen:  Blood from Arm, Right Updated:  11/21/19 2352    Wound culture and Gram stain [[id number]] Collected:  11/21/19 2345    Lab Status: In process Specimen:  Wound from Abdominal Updated:  11/21/19 2352    Lactic acid, plasma x2 [[id number]] Collected:  11/21/19 2333    Lab Status:  No result Specimen:  Blood from Arm, Right                  CT abdomen pelvis with contrast   Final Result by Mack Oconnor MD (11/22 0120)      1  Very large multilobulated ventral abdominal wall hernia containing multiple loops of nonobstructed small and large bowel     2   Between three abdominal wall hernia lobules there is a subcutaneous fluid and air collection measuring 5 2 x 3 2 x 4 6 cm with adjacent fat stranding and skin thickening likely due to an abscess with overlying cellulitis  If there is clinical    concern for enterocutaneous fistula at this location, repeat CT with enteric contrast may be obtained  Workstation performed: [id number]                       ED Course  ED Course as of Nov 22 0348 Fri Nov 22, 2019   [id number] Patient was wound dehiscence with purulence drainage, CT scan shows an underlying abscess with cellulitis, she also has leukocytosis, however vital signs are stable, therefore discussed with hospitalist service who agrees to admit for further evaluation and treatment, patient in agreement as well                              Disposition  Final diagnoses:   Abdominal wall abscess at site of surgical wound     Time reflects when diagnosis was documented in both MDM as applicable and the Disposition within this note     Time User Action Codes Description Comment    11/22/2019  1:38 AM Luis Cox Add [[id number]] Abdominal wall abscess at site of surgical wound       ED Disposition     ED Disposition Condition Date/Time Comment    Admit Stable Fri Nov 22, 2019  1:38 AM Case was discussed with YAMILE Reese and the patient's admission status was agreed to be Admission Status: inpatient status to the service of Dr Tommy Menezes   Follow-up Information    None         Current Discharge Medication List      CONTINUE these medications which have NOT CHANGED    Details   aspirin (ECOTRIN LOW STRENGTH) 81 mg EC tablet Take 81 mg by mouth daily      Exenatide ER (BYDUREON) 2 MG PEN Inject 2 mg under the skin once a week      metFORMIN (GLUCOPHAGE) 500 mg tablet Take 500 mg by mouth daily      metoprolol tartrate (LOPRESSOR) 100 mg tablet Take 25 mg by mouth daily      PARoxetine (PAXIL) 10 mg tablet Take 10 mg by mouth daily      simvastatin (ZOCOR) 20 mg tablet Take 20 mg by mouth daily at bedtime           No discharge procedures on file      ED Provider  Electronically Signed by           Lj Pierre DO  11/22/19 4049

## 2019-11-22 NOTE — ED NOTES
Patient ambulatory to restroom to provide urine sample at this time        Hugo Grove RN  11/21/19 1331

## 2019-11-22 NOTE — H&P
H&P- Gail Carrel 1960, 61 y o  female MRN: [id number]    Unit/Bed#: -Jose Encounter: [id number]    Primary Care Provider: Jaleesa Hutchinson MD   Date and time admitted to hospital: 11/21/2019 11:23 PM    * Cutaneous abscess of abdominal wall  Assessment & Plan  · Present on admission  · History abdominal wall abscess and cellulitis June 2019 status post treatment by General surgery with formal drainage and washout at [facility]  · Presents with 5 months of discharge from small hole than abdominal wall and over the past 3 days development of erythema, edema, severe tenderness and purulent discharge  · CT- IMPRESSION:  Very large multiloculated ventral abdominal hernia containing multiple loops of nonobstructed small large bowel  Between 3 abdominal wall hernia lobules there is a subcutaneous fluid and air collection measuring 5 2 x 3 2 x 4 6 cm with adjacent fat stranding and skin thickening likely due to an abscess with overlying cellulitis        · Patient also with leukocytosis, blood and wound cultures are pending  · Trend CBC, procalcitonin, initiate broad-spectrum antibiotic coverage in this diabetic female with recurrent abdominal abscess and possible enterocutaneous fistula will utilize vancomycin, cefepime, Flagyl  · Consultation to General surgery for possible drainage of abscess later today, we will keep patient NPO     Benign essential hypertension  Assessment & Plan  · Continue Lopressor home dosage  · Trend blood pressures    Supraventricular tachycardia ([address] )  Assessment & Plan  · History of SVT, status post ablation 5 years ago at [facility]  · Maintained on Toprol XL  · Will obtain EKG preoperatively  · Denies symptoms of palpitations    Morbid obesity with BMI of 45 0-49 9, adult (HCC)  Assessment & Plan  · Intensive lifestyle modification    Hypercholesterolemia  Assessment & Plan  · Continue statin therapy with Pravachol 20 mg daily  · Follow-up with primary care provider regarding lipid management    Depressive disorder  Assessment & Plan  · Continue Paxil      VTE Prophylaxis: No anticoagulation due to possible surgical procedure later today  / sequential compression device   Code Status:  Full  POLST: POLST is not applicable to this patient  Discussion with family:   at the bedside    Anticipated Length of Stay:  Patient will be admitted on an Inpatient basis with an anticipated length of stay of  > 2 midnights  Justification for Hospital Stay:  Recurrence abdominal wall abscess with cellulitis    Total Time for Visit, including Counseling / Coordination of Care: 45 minutes  Greater than 50% of this total time spent on direct patient counseling and coordination of care  Chief Complaint:   Abdominal wall pain and drainage    History of Present Illness:    Radha Borjas is a 61 y o  female who presents with 3 days of worsening abdominal wall edema, tenderness to palpation, erythema and the development of her ill and discharge  Patient with history of multiple abdominal surgeries including laparoscopic cholecystectomy, bowel resection, and continues to have large ventral hernia  She reports that in June of 2019 there was a spider bite on her abdominal wall that after 2 weeks became infected and she was admitted with sepsis to [facility] in [place]  Her abscess was surgically drained by General Surgeon and she did well postoperatively for approximately 6 weeks after which time she did develop a small opening over the healed site with chronic non purulent discharge  She has seen at the general surgeon and her primary care provider on multiple occasions and has elected not to have a repeat surgery    Unfortunately over the past 3 days her abdomen has become extremely tender, erythematous, edematous and the discharge is now copious purulence prompting her presentation to the emergency department  She was found to a leukocytosis and CT scan of the pelvis revealing fluid collection, abdominal wall cellulitis, and possible enterocutaneous fistula  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  Review of Systems:    Review of Systems   Constitutional: Positive for chills and fever  Negative for activity change, appetite change, diaphoresis, fatigue and unexpected weight change  HENT: Negative for congestion, sore throat and trouble swallowing  Eyes: Negative for visual disturbance  Respiratory: Negative for cough and chest tightness  Cardiovascular: Negative for chest pain, palpitations and leg swelling  Gastrointestinal: Positive for abdominal distention and abdominal pain  Negative for constipation, diarrhea, nausea and vomiting  Endocrine: Negative for polydipsia, polyphagia and polyuria  Genitourinary: Negative for decreased urine volume, difficulty urinating and dysuria  Musculoskeletal: Negative for arthralgias, gait problem and myalgias  Skin: Positive for wound  Negative for rash  Allergic/Immunologic: Negative for immunocompromised state  Neurological: Negative for dizziness, seizures, syncope, weakness, numbness and headaches  Hematological: Does not bruise/bleed easily  Psychiatric/Behavioral: Negative for sleep disturbance  The patient is not nervous/anxious  All other systems reviewed and are negative  Past Medical and Surgical History:     Past Medical History:   Diagnosis Date    Diabetes mellitus ([facility] [address] )     High cholesterol     Hypertension     Psychiatric disorder        Past Surgical History:   Procedure Laterality Date    BOWEL RESECTION      CHOLECYSTECTOMY LAPAROSCOPIC      HERNIA REPAIR         Meds/Allergies:    Prior to Admission medications    Medication Sig Start Date End Date Taking?  Authorizing Provider   aspirin (ECOTRIN LOW STRENGTH) 81 mg EC tablet Take 81 mg by mouth daily   Yes Historical Provider, MD   Exenatide ER (BYDUREON) 2 MG PEN Inject 2 mg under the skin once a week   Yes Historical Provider, MD   metFORMIN (GLUCOPHAGE) 500 mg tablet Take 500 mg by mouth daily   Yes Historical Provider, MD   metoprolol succinate (TOPROL-XL) 100 mg 24 hr tablet Take 100 mg by mouth daily 10/1/19  Yes Historical Provider, MD   PARoxetine (PAXIL) 10 mg tablet Take 10 mg by mouth daily   Yes Historical Provider, MD   simvastatin (ZOCOR) 20 mg tablet Take 20 mg by mouth daily at bedtime   Yes Historical Provider, MD   metoprolol tartrate (LOPRESSOR) 100 mg tablet Take 25 mg by mouth daily  11/22/19 Yes Historical Provider, MD     I have reviewed home medications with patient personally  Allergies: Allergies   Allergen Reactions    Zithromax [Azithromycin] Abdominal Pain    Penicillins Rash       Social History:    Marital Status: /Civil Union   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[place] [address]   Patient Pre-hospital Living Situation: independent  Patient Pre-hospital Level of Mobility: independent  Patient Pre-hospital Diet Restrictions: none   Substance Use History:   Social History     Substance and Sexual Activity   Alcohol Use Yes    Comment: rarely     Social History     Tobacco Use   Smoking Status Never Smoker   Smokeless Tobacco Never Used     Social History     Substance and Sexual Activity   Drug Use Never       Family History:    History reviewed  No pertinent family history  Physical Exam:     Vitals:   Blood Pressure: (!) 173/84 (11/22/19 0204)  Pulse: 86 (11/22/19 0204)  Temperature: 98 1 °F (36 7 °C) (11/22/19 0204)  Temp Source: Oral (11/22/19 0204)  Respirations: 18 (11/22/19 0204)  Height: 5' 7" (170 2 cm) (11/22/19 0204)  Weight - Scale: (!) 138 kg (303 lb 6 4 oz) (11/22/19 0204)  SpO2: 96 % (11/22/19 0204)    Physical Exam   Constitutional: She is oriented to person, place, and time  She appears well-developed and well-nourished  No distress     HENT:   Head: Normocephalic and atraumatic  Mouth/Throat: Oropharynx is clear and moist    Eyes: Conjunctivae are normal    Neck: Normal range of motion  Neck supple  Cardiovascular: Normal rate, regular rhythm, normal heart sounds and intact distal pulses  Pulmonary/Chest: Effort normal and breath sounds normal    Abdominal: Bowel sounds are normal  She exhibits distension  There is tenderness  Fistula like draining wound, please see picture   Musculoskeletal: Normal range of motion  She exhibits no edema, tenderness or deformity  Neurological: She is alert and oriented to person, place, and time  No cranial nerve deficit  Coordination normal    Skin: Skin is warm and dry  Capillary refill takes less than 2 seconds  No rash noted  There is erythema  Psychiatric: She has a normal mood and affect  Nursing note and vitals reviewed  Additional Data:     Lab Results: I have personally reviewed pertinent reports  Results from last 7 days   Lab Units 11/21/19  2334   WBC Thousand/uL 13 68*   HEMOGLOBIN g/dL 11 2*   HEMATOCRIT % 35 9   PLATELETS Thousands/uL 452*   NEUTROS PCT % 67   LYMPHS PCT % 23   MONOS PCT % 8   EOS PCT % 1     Results from last 7 days   Lab Units 11/21/19  2334   SODIUM mmol/L 139   POTASSIUM mmol/L 4 1   CHLORIDE mmol/L 100   CO2 mmol/L 28   BUN mg/dL 18   CREATININE mg/dL 0 61   ANION GAP mmol/L 11   CALCIUM mg/dL 9 5   ALBUMIN g/dL 3 8   TOTAL BILIRUBIN mg/dL 0 55   ALK PHOS U/L 121*   ALT U/L 18   AST U/L 12   GLUCOSE RANDOM mg/dL 154*                 Results from last 7 days   Lab Units 11/21/19  2355   LACTIC ACID mmol/L 1 4       Imaging: I have personally reviewed pertinent reports  CT abdomen pelvis with contrast   Final Result by Lucia Ellington MD (11/22 0120)      1  Very large multilobulated ventral abdominal wall hernia containing multiple loops of nonobstructed small and large bowel     2   Between three abdominal wall hernia lobules there is a subcutaneous fluid and air collection measuring 5 2 x 3 2 x 4 6 cm with adjacent fat stranding and skin thickening likely due to an abscess with overlying cellulitis  If there is clinical    concern for enterocutaneous fistula at this location, repeat CT with enteric contrast may be obtained  Workstation performed: [id number]             EKG, Pathology, and Other Studies Reviewed on Admission:   All reports  Allscripts / Epic Records Reviewed: Yes     ** Please Note: This note has been constructed using a voice recognition system   **

## 2019-11-23 LAB
APTT PPP: 28 SECONDS (ref 23–37)
GLUCOSE SERPL-MCNC: 136 MG/DL (ref 65–140)
GLUCOSE SERPL-MCNC: 152 MG/DL (ref 65–140)
GLUCOSE SERPL-MCNC: 168 MG/DL (ref 65–140)
GLUCOSE SERPL-MCNC: 168 MG/DL (ref 65–140)
GLUCOSE SERPL-MCNC: 283 MG/DL (ref 65–140)
INR PPP: 0.97 (ref 0.84–1.19)
PROTHROMBIN TIME: 12.9 SECONDS (ref 11.6–14.5)

## 2019-11-23 PROCEDURE — NC001 PR NO CHARGE: Performed by: SURGERY

## 2019-11-23 PROCEDURE — 82948 REAGENT STRIP/BLOOD GLUCOSE: CPT

## 2019-11-23 PROCEDURE — 85610 PROTHROMBIN TIME: CPT | Performed by: FAMILY MEDICINE

## 2019-11-23 PROCEDURE — 99232 SBSQ HOSP IP/OBS MODERATE 35: CPT | Performed by: FAMILY MEDICINE

## 2019-11-23 PROCEDURE — 85730 THROMBOPLASTIN TIME PARTIAL: CPT | Performed by: FAMILY MEDICINE

## 2019-11-23 RX ORDER — INSULIN GLARGINE 100 [IU]/ML
5 INJECTION, SOLUTION SUBCUTANEOUS
Status: DISCONTINUED | OUTPATIENT
Start: 2019-11-23 | End: 2019-11-24

## 2019-11-23 RX ORDER — HEPARIN SODIUM 5000 [USP'U]/ML
5000 INJECTION, SOLUTION INTRAVENOUS; SUBCUTANEOUS EVERY 8 HOURS SCHEDULED
Status: DISCONTINUED | OUTPATIENT
Start: 2019-11-23 | End: 2019-11-26 | Stop reason: HOSPADM

## 2019-11-23 RX ADMIN — Medication 2000 MG: at 04:07

## 2019-11-23 RX ADMIN — SODIUM CHLORIDE 75 ML/HR: 0.9 INJECTION, SOLUTION INTRAVENOUS at 01:19

## 2019-11-23 RX ADMIN — Medication 2000 MG: at 13:14

## 2019-11-23 RX ADMIN — INSULIN LISPRO 1 UNITS: 100 INJECTION, SOLUTION INTRAVENOUS; SUBCUTANEOUS at 12:12

## 2019-11-23 RX ADMIN — METRONIDAZOLE 500 MG: 500 INJECTION, SOLUTION INTRAVENOUS at 12:08

## 2019-11-23 RX ADMIN — INSULIN GLARGINE 5 UNITS: 100 INJECTION, SOLUTION SUBCUTANEOUS at 21:52

## 2019-11-23 RX ADMIN — HEPARIN SODIUM 5000 UNITS: 5000 INJECTION INTRAVENOUS; SUBCUTANEOUS at 16:53

## 2019-11-23 RX ADMIN — PRAVASTATIN SODIUM 20 MG: 20 TABLET ORAL at 16:53

## 2019-11-23 RX ADMIN — INSULIN LISPRO 1 UNITS: 100 INJECTION, SOLUTION INTRAVENOUS; SUBCUTANEOUS at 21:51

## 2019-11-23 RX ADMIN — VANCOMYCIN HYDROCHLORIDE 1750 MG: 1 INJECTION, POWDER, LYOPHILIZED, FOR SOLUTION INTRAVENOUS at 05:33

## 2019-11-23 RX ADMIN — INSULIN LISPRO 1 UNITS: 100 INJECTION, SOLUTION INTRAVENOUS; SUBCUTANEOUS at 16:57

## 2019-11-23 RX ADMIN — HEPARIN SODIUM 5000 UNITS: 5000 INJECTION INTRAVENOUS; SUBCUTANEOUS at 21:51

## 2019-11-23 RX ADMIN — METRONIDAZOLE 500 MG: 500 INJECTION, SOLUTION INTRAVENOUS at 04:08

## 2019-11-23 RX ADMIN — METOPROLOL SUCCINATE 100 MG: 25 TABLET, EXTENDED RELEASE ORAL at 09:52

## 2019-11-23 RX ADMIN — Medication 2000 MG: at 20:09

## 2019-11-23 RX ADMIN — PAROXETINE 10 MG: 20 TABLET, FILM COATED ORAL at 09:52

## 2019-11-23 NOTE — ASSESSMENT & PLAN NOTE
· Present on admission  · History abdominal wall abscess and cellulitis June 2019 status post treatment by General surgery with formal drainage and washout at [facility]  · Presents with 5 months of discharge from small hole than abdominal wall and over the past 3 days development of erythema, edema, severe tenderness and purulent discharge  · CT- IMPRESSION:  Very large multiloculated ventral abdominal hernia containing multiple loops of nonobstructed small large bowel  Between 3 abdominal wall hernia lobules there is a subcutaneous fluid and air collection measuring 5 2 x 3 2 x 4 6 cm with adjacent fat stranding and skin thickening likely due to an abscess with overlying cellulitis  · Patient also with leukocytosis, blood and wound cultures are pending  · Surgery evaluation appreciated-no plan for incision and drainage  · Currently patient is on cefepime Flagyl and vancomycin, wound culture is growing gram-negative rods    Will DC vancomycin and Flagyl  · Likely transition to cefazolin once the cultures are back

## 2019-11-23 NOTE — PROGRESS NOTES
Progress Note - Brigitte Perez 1960, 61 y o  female MRN: [id number]    Unit/Bed#: -Jose Encounter: [id number]    Primary Care Provider: Natasha Barrera MD   Date and time admitted to hospital: 11/21/2019 11:23 PM        * Cutaneous abscess of abdominal wall  Assessment & Plan  · Present on admission  · History abdominal wall abscess and cellulitis June 2019 status post treatment by General surgery with formal drainage and washout at [facility]  · Presents with 5 months of discharge from small hole than abdominal wall and over the past 3 days development of erythema, edema, severe tenderness and purulent discharge  · CT- IMPRESSION:  Very large multiloculated ventral abdominal hernia containing multiple loops of nonobstructed small large bowel  Between 3 abdominal wall hernia lobules there is a subcutaneous fluid and air collection measuring 5 2 x 3 2 x 4 6 cm with adjacent fat stranding and skin thickening likely due to an abscess with overlying cellulitis  · Patient also with leukocytosis, blood and wound cultures are pending  · Surgery evaluation appreciated-no plan for incision and drainage  · Currently patient is on cefepime Flagyl and vancomycin, wound culture is growing gram-negative rods    Will DC vancomycin and Flagyl  · Likely transition to cefazolin once the cultures are back    Benign essential hypertension  Assessment & Plan  · Continue Lopressor home dosage  · Trend blood pressures    Supraventricular tachycardia ([address] )  Assessment & Plan  · History of SVT, status post ablation 5 years ago at [facility]  · Maintained on Toprol XL  · Currently in sinus rhythm      Morbid obesity with BMI of 45 0-49 9, adult [facility])  Assessment & Plan  · Intensive lifestyle modification  · May benefit from weight loss before the abdominal wall repair    Hypercholesterolemia  Assessment & Plan  · Continue statin therapy with Pravachol 20 mg daily  · Follow-up with primary care provider regarding lipid management    Depressive disorder  Assessment & Plan  · Continue Paxil        VTE Pharmacologic Prophylaxis:   Pharmacologic: Heparin  Mechanical VTE Prophylaxis in Place: Yes    Patient Centered Rounds: I have performed bedside rounds with nursing staff today  Discussions with Specialists or Other Care Team Provider:     Education and Discussions with Family / Patient:  Family updated in the room  detail    Time Spent for Care: 30 minutes  More than 50% of total time spent on counseling and coordination of care as described above  Current Length of Stay: 1 day(s)    Current Patient Status: Inpatient   Certification Statement: The patient will continue to require additional inpatient hospital stay due to IV antibiotics    Discharge Plan:     Code Status: Level 1 - Full Code      Subjective:   Patient seen and examined  No specific complaints  Discussed with surgery  Area of cellulitis is improving  No plan for any incision and drainage  Objective:     Vitals:   Temp (24hrs), Av 1 °F (36 7 °C), Min:97 8 °F (36 6 °C), Max:98 6 °F (37 °C)    Temp:  [97 8 °F (36 6 °C)-98 6 °F (37 °C)] 98 1 °F (36 7 °C)  HR:  [81-92] 84  Resp:  [16-18] 18  BP: (121-152)/(57-69) 122/68  SpO2:  [91 %-98 %] 98 %  Body mass index is 47 52 kg/m²  Input and Output Summary (last 24 hours): Intake/Output Summary (Last 24 hours) at 2019 1532  Last data filed at 2019 0600  Gross per 24 hour   Intake 2900 ml   Output --   Net 2900 ml       Physical Exam:     Physical Exam   Constitutional: She appears well-developed  No distress  HENT:   Head: Normocephalic and atraumatic  Eyes: Right eye exhibits no discharge  Left eye exhibits no discharge  Neck: No JVD present  Cardiovascular: Normal rate and regular rhythm  Pulmonary/Chest: Effort normal  No respiratory distress  Abdominal: Soft  She exhibits no mass   There is no rebound and no guarding  Area of erythema is improving  Erythematous area is soft to touch today when compared to yesterday  Hernia is present  Non reducible     Musculoskeletal: Normal range of motion  She exhibits no edema  Neurological: She is alert  No cranial nerve deficit  Coordination normal    Skin: Skin is warm  Additional Data:     Labs:    Results from last 7 days   Lab Units 11/22/19  0629 11/21/19  2334   WBC Thousand/uL 10 43* 13 68*   HEMOGLOBIN g/dL 9 4* 11 2*   HEMATOCRIT % 29 4* 35 9   PLATELETS Thousands/uL 325 452*   NEUTROS PCT %  --  67   LYMPHS PCT %  --  23   MONOS PCT %  --  8   EOS PCT %  --  1     Results from last 7 days   Lab Units 11/22/19  0629 11/21/19  2334   POTASSIUM mmol/L 4 0 4 1   CHLORIDE mmol/L 104 100   CO2 mmol/L 25 28   BUN mg/dL 15 18   CREATININE mg/dL 0 56* 0 61   CALCIUM mg/dL 9 0 9 5   ALK PHOS U/L  --  121*   ALT U/L  --  18   AST U/L  --  12     Results from last 7 days   Lab Units 11/23/19  0622   INR  0 97       * I Have Reviewed All Lab Data Listed Above  * Additional Pertinent Lab Tests Reviewed: [address] 66 Admission Reviewed    Imaging:    Imaging Reports Reviewed Today Include:   Imaging Personally Reviewed by Myself Includes:      Recent Cultures (last 7 days):     Results from last 7 days   Lab Units 11/21/19  2345 11/21/19  2342 11/21/19  2334   BLOOD CULTURE   --  No Growth at 24 hrs  No Growth at 24 hrs     GRAM STAIN RESULT  2+ Polys*  1+ Gram negative rods*  --   --    URINE CULTURE  Culture too young- will reincubate  --   --    WOUND CULTURE  1+ Growth of Gram Negative Christopher*  --   --        Last 24 Hours Medication List:     Current Facility-Administered Medications:  acetaminophen 650 mg Oral Q6H PRN Yumiko S Hugh, CRNP    cefepime 2,000 mg Intravenous Q8H Yumiko S Hugh, CRNP Last Rate: 2,000 mg (11/23/19 1314)   hydrALAZINE 5 mg Intravenous Q6H PRN Yumiko S Hugh, CRNP    insulin lispro 1-5 Units Subcutaneous TID AC Yumiko S Hugh, CRNP insulin lispro 1-5 Units Subcutaneous HS Yumiko S Hugh, CRNP    metoprolol succinate 100 mg Oral Daily Yumiko S Hugh, CRNP    ondansetron 4 mg Intravenous Q6H PRN Yumiko S Hugh, CRNP    PARoxetine 10 mg Oral Daily Yumiko S Hugh, CRNP    pravastatin 20 mg Oral Daily With Dinner Yumiko S Hugh, CRNP         Today, Patient Was Seen By: Gamaliel Calix MD    ** Please Note: Dictation voice to text software may have been used in the creation of this document   **

## 2019-11-23 NOTE — UTILIZATION REVIEW
Initial Clinical Review    Admission: Date/Time/Statement: Inpatient Admission Orders (From admission, onward)     Ordered        11/22/19 0139  Inpatient Admission  Once                   Orders Placed This Encounter   Procedures    Inpatient Admission     Standing Status:   Standing     Number of Occurrences:   1     Order Specific Question:   Admitting Physician     Answer:   Heron Spence [[id number]]     Order Specific Question:   Level of Care     Answer:   Med Surg [[id number]]     Order Specific Question:   Estimated length of stay     Answer:   More than 2 Midnights     Order Specific Question:   Certification     Answer:   I certify that inpatient services are medically necessary for this patient for a duration of greater than two midnights  See H&P and MD Progress Notes for additional information about the patient's course of treatment  ED Arrival Information     Expected Arrival Acuity Means of Arrival Escorted By Service Admission Type    - 11/21/2019 23:12 Emergent Walk-In Self Hospitalist Emergency    Arrival Complaint    fever hx of sepsis        Chief Complaint   Patient presents with    Wound Infection     patient reports infected wound to abdomen  Assessment/Plan: 60 yo female to ED from home w/ 3 days of worsening abd wall edema, tenderness to palpation and erythema   June of 2019 there was a spider bite on her abdominal wall that after 2 weeks became infected and she was admitted with sepsis to [facility]  Her abscess was surgically drained by General Surgeon and she did well postoperatively for approximately 6 weeks after which time she did develop a small opening over the healed site with chronic non purulent discharge  over the past 3 days her abdomen has become extremely tender, erythematous, edematous and the discharge is now copious purulence prompting her presentation to the emergency department    She was found to a leukocytosis and CT scan of the pelvis revealing fluid collection, abdominal wall cellulitis, and possible enterocutaneous fistula  Admitted IP status w/ abscess of abd wall  Will trend labs, treat w  IVF , consult general surgery and keep NPO       11/22 General surgery consult   Draining abdominal wound, Imaging shows concern for "fluid and air collection measuring 5 2 x 3 2 x 4 6 cm with adjacent fat stranding and skin thickening likely due to an abscess with overlying cellulitis"  Concern for enterocutaneous fistula  Gram stain showing 2+ polys and 1+ GNR  Plan for no surgical intervention at this time  IVF , IV abx , monitor bld and wound cx , dressing change daily and prn                    ED Triage Vitals   Temperature Pulse Respirations Blood Pressure SpO2   11/21/19 2332 11/21/19 2321 11/21/19 2321 11/21/19 2321 11/21/19 2320   98 1 °F (36 7 °C) 90 20 (!) 229/100 99 %      Temp Source Heart Rate Source Patient Position - Orthostatic VS BP Location FiO2 (%)   11/21/19 2332 -- 11/21/19 2321 11/21/19 2321 --   Oral  Sitting Right arm       Pain Score       11/21/19 2320       6        Wt Readings from Last 1 Encounters:   11/22/19 (!) 138 kg (303 lb 6 4 oz)     Additional Vital Signs:   11/23/19 08:22:07  98 °F (36 7 °C)  91  18  137/66  90  97 %  --  --   11/23/19 03:31:51  97 8 °F (36 6 °C)  81  18  121/57  78  94 %  --  --   11/23/19 00:08:11  98 6 °F (37 °C)  92  18  152/69  97  98 %  --  --   11/22/19 2000  --  --  --  --  --  91 %  None (Room air)  --   11/22/19 19:36:58  98 2 °F (36 8 °C)  89  16  130/57  81  97 %  None (Room air)  Lying   11/22/19 15:19:21  98 1 °F (36 7 °C)  84  18  148/75  99  94 %  --  Lying   11/22/19 07:17:37  --  --  --  147/73  98  --  --  --   11/22/19 0716  98 2 °F (36 8 °C)  90  18  --  --  96 %  --  --   11/22/19 0534  --  --  --  140/68  --  --  --  --   11/22/19 0204  98 1 °F (36 7 °C)  86  18  173/84Abnormal   105  96 %  None (Room air)  Lying   11/22/19 0135  --  87  18  180/80Abnormal   --  98 %  None (Room air)  Lying   11/21/19 2355  --  84  21  164/69  --  97 %  None (Room air)  Lying   11/21/19 2332  98 1 °F (36 7 °C)  --  --  --  --  --  --  --   11/21/19 2321  --  90  20  229/100Abnormal   --  99 %  None (Room air       Pertinent Labs/Diagnostic Test Results:  11/22 CT abd - 1  Very large multilobulated ventral abdominal wall hernia containing multiple loops of nonobstructed small and large bowel  2   Between three abdominal wall hernia lobules there is a subcutaneous fluid and air collection measuring 5 2 x 3 2 x 4 6 cm with adjacent fat stranding and skin thickening likely due to an abscess with overlying cellulitis  If there is clinical   concern for enterocutaneous fistula at this location, repeat CT with enteric contrast may be obtained    Results from last 7 days   Lab Units 11/22/19  0629 11/21/19  2334   WBC Thousand/uL 10 43* 13 68*   HEMOGLOBIN g/dL 9 4* 11 2*   HEMATOCRIT % 29 4* 35 9   PLATELETS Thousands/uL 325 452*   NEUTROS ABS Thousands/µL  --  9 26*         Results from last 7 days   Lab Units 11/22/19  0629 11/21/19  2334   SODIUM mmol/L 139 139   POTASSIUM mmol/L 4 0 4 1   CHLORIDE mmol/L 104 100   CO2 mmol/L 25 28   ANION GAP mmol/L 10 11   BUN mg/dL 15 18   CREATININE mg/dL 0 56* 0 61   EGFR ml/min/1 73sq m 102 100   CALCIUM mg/dL 9 0 9 5   MAGNESIUM mg/dL 1 7  --    PHOSPHORUS mg/dL 3 8  --      Results from last 7 days   Lab Units 11/21/19  2334   AST U/L 12   ALT U/L 18   ALK PHOS U/L 121*   TOTAL PROTEIN g/dL 7 6   ALBUMIN g/dL 3 8   TOTAL BILIRUBIN mg/dL 0 55     Results from last 7 days   Lab Units 11/23/19  0818 11/23/19  0713 11/22/19  2115 11/22/19  1639 11/22/19  1145 11/22/19  0740   POC GLUCOSE mg/dl 283* 136 136 254* 115 148*     Results from last 7 days   Lab Units 11/22/19  0629 11/21/19  2334   GLUCOSE RANDOM mg/dL 143* 154*     Results from last 7 days   Lab Units 11/21/19  2334   HEMOGLOBIN A1C % 7 7*   EAG mg/dl 174       Results from last 7 days   Lab Units 11/23/19  0622   PROTIME seconds 12 9   INR  0 97   PTT seconds 28       Results from last 7 days   Lab Units 11/21/19  2334   PROCALCITONIN ng/ml <0 05     Results from last 7 days   Lab Units 11/21/19  2355   LACTIC ACID mmol/L 1 4       Results from last 7 days   Lab Units 11/21/19  2345   CLARITY UA  Clear   COLOR UA  Yellow   SPEC GRAV UA  1 025   PH UA  5 0   GLUCOSE UA mg/dl Negative   KETONES UA mg/dl Negative   BLOOD UA  Negative   PROTEIN UA mg/dl Negative   NITRITE UA  Negative   BILIRUBIN UA  Negative   UROBILINOGEN UA E U /dl 0 2   LEUKOCYTES UA  Negative       Results from last 7 days   Lab Units 11/21/19  2345 11/21/19  2342 11/21/19  2334   BLOOD CULTURE   --  Received in Microbiology Lab  Culture in Progress  Received in Microbiology Lab  Culture in Progress     GRAM STAIN RESULT  2+ Polys*  1+ Gram negative rods*  --   --    URINE CULTURE  Culture too young- will reincubate  --   --    WOUND CULTURE  1+ Growth of Gram Negative Christopher*  --   --      ED Treatment:   Medication Administration from 11/21/2019 2312 to 11/22/2019 0157       Date/Time Order Dose Route Action Action by Comments        Past Medical History:   Diagnosis Date    Diabetes mellitus ([facility] [address] )     High cholesterol     Hypertension     Psychiatric disorder      Present on Admission:   Cutaneous abscess of abdominal wall   Benign essential hypertension   Depressive disorder   Hypercholesterolemia   Supraventricular tachycardia (HCC)      Admitting Diagnosis: Fever [R50 9]  Abdominal wall abscess at site of surgical wound [T81 [id number]]  Age/Sex: 61 y o  female  Admission Orders:  Scheduled Medications:    Medications:  vancomycin 20 mg/kg (Adjusted) Intravenous Q12H   And      cefepime 2,000 mg Intravenous Q8H   insulin lispro 1-5 Units Subcutaneous TID AC   insulin lispro 1-5 Units Subcutaneous HS   metoprolol succinate 100 mg Oral Daily   metroNIDAZOLE 500 mg Intravenous Q8H   PARoxetine 10 mg Oral Daily   pravastatin 20 mg Oral Daily With Dinner     Continuous IV Infusions:    sodium chloride 75 mL/hr Intravenous Continuous     PRN Meds:    acetaminophen 650 mg Oral Q6H PRN   hydrALAZINE 5 mg Intravenous Q6H PRN   ondansetron 4 mg Intravenous Q6H PRN   Fingerstick ac and hs   Cons carb diet   SCD  I&O     IP CONSULT TO ACUTE CARE SURGERY  IP CONSULT TO PHARMACY  IP CONSULT TO PHARMACY    Network Utilization Review Department  [email or web address]  ATTENTION: Please call with any questions or concerns to [phone] and carefully listen to the prompts so that you are directed to the right person  All voicemails are confidential   Jennifer Aguilar all requests for admission clinical reviews, approved or denied determinations and any other requests to dedicated fax number below belonging to the campus where the patient is receiving treatment    FACILITY NAME UR FAX NUMBER   ADMISSION DENIALS (Administrative/Medical Necessity) [address] [facility] (Maternity/NICU/Pediatrics) [phone]   [address] [facility] [phone]   [address] [place] [address] [address] [facility] [phone]   Toney Jurado [address] [phone]

## 2019-11-23 NOTE — ASSESSMENT & PLAN NOTE
· History of SVT, status post ablation 5 years ago at [facility]  · Maintained on Toprol XL  · Currently in sinus rhythm

## 2019-11-23 NOTE — PROGRESS NOTES
Progress Note - General Surgery   Sulema Millmont 61 y o  female MRN: [id number]  Unit/Bed#: -01 Encounter: [id number]    Assessment:  Wound drainage , improving cellulitis    Plan:  Continue with current wound care and IV antibiotics for another 24 hours and will reevaluate tomorrow    Subjective/Objective   Chief Complaint: abd wound drainage    Subjective: pt states she feels better and no nausea or vomiting  No pain    Objective:    Blood pressure 137/66, pulse 91, temperature 98 °F (36 7 °C), resp  rate 18, height 5' 7" (1 702 m), weight (!) 138 kg (303 lb 6 4 oz), SpO2 97 %  ,Body mass index is 47 52 kg/m²  Intake/Output Summary (Last 24 hours) at 11/23/2019 0844  Last data filed at 11/23/2019 0600  Gross per 24 hour   Intake 2900 ml   Output --   Net 2900 ml       Invasive Devices     Peripheral Intravenous Line            Peripheral IV 11/21/19 Left Antecubital 1 day    Peripheral IV 11/21/19 Right Antecubital 1 day                Physical Exam: abd: soft and nontedner,  There is serosanguinous drainage from the mid abdomen area and decreasing cellulitis noted , no fluctuance noted    Lab, Imaging and other studies:I have personally reviewed pertinent lab results    , CBC: No results found for: WBC, HGB, HCT, MCV, PLT, ADJUSTEDWBC, MCH, MCHC, RDW, MPV, NRBC, CMP: No results found for: SODIUM, K, CL, CO2, ANIONGAP, BUN, CREATININE, GLUCOSE, CALCIUM, AST, ALT, ALKPHOS, PROT, BILITOT, EGFR  VTE Pharmacologic Prophylaxis: Reason for no pharmacologic prophylaxis pt is ambulatory  VTE Mechanical Prophylaxis: reason for no mechanical VTE prophylaxis pt is ambulatory

## 2019-11-23 NOTE — CONSULTS
Vancomycin IV Pharmacy-to-Dose Consultation  Denis Correa is a 61 y o  female who was receiving Vancomycin IV with management by the Pharmacy Consult service for treatment of skin-soft tissue infection abdominal abscess with possible enterocutaneus fistula  The patients Vancomycin therapy has been completed / discontinued  Thank you for allowing us to take part in this patient's care  Pharmacy will sign-off now; please call or re-consult if there are any questions       Miriam Robledo, PharmD  Pharmacist

## 2019-11-24 PROBLEM — D64.9 ANEMIA: Status: ACTIVE | Noted: 2019-11-24

## 2019-11-24 PROBLEM — E11.9 TYPE 2 DIABETES MELLITUS (HCC): Status: ACTIVE | Noted: 2019-11-24

## 2019-11-24 LAB
ANION GAP SERPL CALCULATED.3IONS-SCNC: 10 MMOL/L (ref 4–13)
BACTERIA UR CULT: NORMAL
BACTERIA WND AEROBE CULT: ABNORMAL
BACTERIA WND AEROBE CULT: ABNORMAL
BUN SERPL-MCNC: 12 MG/DL (ref 5–25)
CALCIUM SERPL-MCNC: 8.9 MG/DL (ref 8.3–10.1)
CHLORIDE SERPL-SCNC: 102 MMOL/L (ref 100–108)
CO2 SERPL-SCNC: 25 MMOL/L (ref 21–32)
CREAT SERPL-MCNC: 0.57 MG/DL (ref 0.6–1.3)
ERYTHROCYTE [DISTWIDTH] IN BLOOD BY AUTOMATED COUNT: 14.2 % (ref 11.6–15.1)
GFR SERPL CREATININE-BSD FRML MDRD: 102 ML/MIN/1.73SQ M
GLUCOSE SERPL-MCNC: 128 MG/DL (ref 65–140)
GLUCOSE SERPL-MCNC: 131 MG/DL (ref 65–140)
GLUCOSE SERPL-MCNC: 138 MG/DL (ref 65–140)
GLUCOSE SERPL-MCNC: 144 MG/DL (ref 65–140)
GLUCOSE SERPL-MCNC: 178 MG/DL (ref 65–140)
GRAM STN SPEC: ABNORMAL
GRAM STN SPEC: ABNORMAL
HCT VFR BLD AUTO: 30.3 % (ref 34.8–46.1)
HGB BLD-MCNC: 9.7 G/DL (ref 11.5–15.4)
MCH RBC QN AUTO: 26.1 PG (ref 26.8–34.3)
MCHC RBC AUTO-ENTMCNC: 32 G/DL (ref 31.4–37.4)
MCV RBC AUTO: 82 FL (ref 82–98)
PLATELET # BLD AUTO: 356 THOUSANDS/UL (ref 149–390)
PMV BLD AUTO: 8.8 FL (ref 8.9–12.7)
POTASSIUM SERPL-SCNC: 3.9 MMOL/L (ref 3.5–5.3)
RBC # BLD AUTO: 3.71 MILLION/UL (ref 3.81–5.12)
SODIUM SERPL-SCNC: 137 MMOL/L (ref 136–145)
WBC # BLD AUTO: 9.71 THOUSAND/UL (ref 4.31–10.16)

## 2019-11-24 PROCEDURE — NC001 PR NO CHARGE: Performed by: SURGERY

## 2019-11-24 PROCEDURE — 87186 SC STD MICRODIL/AGAR DIL: CPT | Performed by: SURGERY

## 2019-11-24 PROCEDURE — 99232 SBSQ HOSP IP/OBS MODERATE 35: CPT | Performed by: FAMILY MEDICINE

## 2019-11-24 PROCEDURE — 87070 CULTURE OTHR SPECIMN AEROBIC: CPT | Performed by: SURGERY

## 2019-11-24 PROCEDURE — 0J980ZZ DRAINAGE OF ABDOMEN SUBCUTANEOUS TISSUE AND FASCIA, OPEN APPROACH: ICD-10-PCS | Performed by: FAMILY MEDICINE

## 2019-11-24 PROCEDURE — 85027 COMPLETE CBC AUTOMATED: CPT | Performed by: FAMILY MEDICINE

## 2019-11-24 PROCEDURE — 80048 BASIC METABOLIC PNL TOTAL CA: CPT | Performed by: FAMILY MEDICINE

## 2019-11-24 PROCEDURE — 82948 REAGENT STRIP/BLOOD GLUCOSE: CPT

## 2019-11-24 PROCEDURE — 87205 SMEAR GRAM STAIN: CPT | Performed by: SURGERY

## 2019-11-24 PROCEDURE — 87077 CULTURE AEROBIC IDENTIFY: CPT | Performed by: SURGERY

## 2019-11-24 RX ORDER — CEFAZOLIN SODIUM 2 G/50ML
2000 SOLUTION INTRAVENOUS EVERY 8 HOURS
Status: DISCONTINUED | OUTPATIENT
Start: 2019-11-24 | End: 2019-11-26 | Stop reason: HOSPADM

## 2019-11-24 RX ORDER — LIDOCAINE HYDROCHLORIDE 10 MG/ML
5 INJECTION, SOLUTION EPIDURAL; INFILTRATION; INTRACAUDAL; PERINEURAL ONCE
Status: COMPLETED | OUTPATIENT
Start: 2019-11-24 | End: 2019-11-24

## 2019-11-24 RX ORDER — INSULIN GLARGINE 100 [IU]/ML
8 INJECTION, SOLUTION SUBCUTANEOUS
Status: DISCONTINUED | OUTPATIENT
Start: 2019-11-24 | End: 2019-11-25

## 2019-11-24 RX ADMIN — LIDOCAINE HYDROCHLORIDE 4 ML: 10 INJECTION, SOLUTION EPIDURAL; INFILTRATION; INTRACAUDAL; PERINEURAL at 08:55

## 2019-11-24 RX ADMIN — HEPARIN SODIUM 5000 UNITS: 5000 INJECTION INTRAVENOUS; SUBCUTANEOUS at 21:46

## 2019-11-24 RX ADMIN — CEFAZOLIN SODIUM 2000 MG: 2 SOLUTION INTRAVENOUS at 09:37

## 2019-11-24 RX ADMIN — METOPROLOL SUCCINATE 100 MG: 25 TABLET, EXTENDED RELEASE ORAL at 08:15

## 2019-11-24 RX ADMIN — INSULIN LISPRO 2 UNITS: 100 INJECTION, SOLUTION INTRAVENOUS; SUBCUTANEOUS at 17:38

## 2019-11-24 RX ADMIN — INSULIN GLARGINE 8 UNITS: 100 INJECTION, SOLUTION SUBCUTANEOUS at 21:47

## 2019-11-24 RX ADMIN — INSULIN LISPRO 1 UNITS: 100 INJECTION, SOLUTION INTRAVENOUS; SUBCUTANEOUS at 12:46

## 2019-11-24 RX ADMIN — INSULIN LISPRO 2 UNITS: 100 INJECTION, SOLUTION INTRAVENOUS; SUBCUTANEOUS at 12:46

## 2019-11-24 RX ADMIN — CEFAZOLIN SODIUM 2000 MG: 2 SOLUTION INTRAVENOUS at 17:02

## 2019-11-24 RX ADMIN — HEPARIN SODIUM 5000 UNITS: 5000 INJECTION INTRAVENOUS; SUBCUTANEOUS at 15:11

## 2019-11-24 RX ADMIN — PRAVASTATIN SODIUM 20 MG: 20 TABLET ORAL at 17:02

## 2019-11-24 RX ADMIN — PAROXETINE 10 MG: 20 TABLET, FILM COATED ORAL at 08:16

## 2019-11-24 RX ADMIN — HEPARIN SODIUM 5000 UNITS: 5000 INJECTION INTRAVENOUS; SUBCUTANEOUS at 06:14

## 2019-11-24 RX ADMIN — Medication 2000 MG: at 04:27

## 2019-11-24 NOTE — PROGRESS NOTES
Pt seen and states more redness at the umbilical area  Denies any nausea or vomiting  afeb  Abd:; soft and tender at the mid area  there is a bubble like area at the mid abdomen with erythema noted and fluctuance noted with surrounding cellulitis noted  I recommend an incision and drainage of the area   Details: the area was prepped in a sterile manner and 1% lidocaine was instilled into the area   An 11 blade was used and incision was made and gas was expelled from the area  Minimal serous drainage was noted and minimal purulent drainage was noted  A deep wound culture was obtained  A gauze dressing was applied  Assessment; Abdominal wound with clinical evidence of enterocutaneous fistula noted  Plan:  Local wound care and antibiotics for now  The pt does state that she will be following with a surgeon in [facility]  I recommend we get her to this surgeon once we get the local erythema controlled  The pt understands and agrees with the plan

## 2019-11-24 NOTE — ASSESSMENT & PLAN NOTE
· History of SVT, status post ablation 5 years ago at [facility]  · Maintained on Toprol XL  · Currently in sinus rhythm

## 2019-11-24 NOTE — PROGRESS NOTES
Spoke with patient regarding abdominal area cellulitis  Pt  Concerned that area mid abdominal area to right of umbilicus has increased in size and appears to be more swollen  Circumference of originally measured cellulitis has also increased approximately 5 cm to the right of original outline  Pt  States, however that she feels a lot better but is concerned  Will continue to monitor

## 2019-11-24 NOTE — PROGRESS NOTES
Progress Note - Mariana Wright 1960, 61 y o  female MRN: [id number]    Unit/Bed#: -01 Encounter: [id number]    Primary Care Provider: Ryanne Decker MD   Date and time admitted to hospital: 11/21/2019 11:23 PM        * Cutaneous abscess of abdominal wall  Assessment & Plan  · Present on admission  · History abdominal wall abscess and cellulitis June 2019 status post treatment by General surgery with formal drainage and washout at [facility]  · Presents with 5 months of discharge from small hole than abdominal wall and over the past 3 days development of erythema, edema, severe tenderness and purulent discharge  · CT- IMPRESSION:  Very large multiloculated ventral abdominal hernia containing multiple loops of nonobstructed small large bowel  Between 3 abdominal wall hernia lobules there is a subcutaneous fluid and air collection measuring 5 2 x 3 2 x 4 6 cm with adjacent fat stranding and skin thickening likely due to an abscess with overlying cellulitis  · Patient also with leukocytosis, blood culture remained negative  Wound culture is growing says Klebsiella  Antibiotics transition to high-dose cefazolin  · Had I&D done today by surgery  Follow-up for the deep culture    Anemia  Assessment & Plan  · Monitor H andH  · Likely related to acute sickness  · Outpatient GI workup    Type 2 diabetes mellitus [facility])  Assessment & Plan  Lab Results   Component Value Date    HGBA1C 7 7 (H) 11/21/2019       Recent Labs     11/23/19  1651 11/23/19  2119 11/24/19  0737 11/24/19  1106   POCGLU 168* 168* 138 178*       Blood Sugar Average: Last 72 hrs:  (P) 992 [id number]   Blood sugar still elevated  Will increase the Lantus to 80 units and add 2 units of Humalog mealtime  Continue with the sliding scale monitor blood sugar and adjust the dose of insulin as needed    Last hemoglobin A1c is 7 7    Benign essential hypertension  Assessment & Plan  · Continue Lopressor home dosage  · Trend blood pressures    Supraventricular tachycardia ([address] )  Assessment & Plan  · History of SVT, status post ablation 5 years ago at [facility]  · Maintained on Toprol XL  · Currently in sinus rhythm      Morbid obesity with BMI of 45 0-49 9, adult [facility])  Assessment & Plan  · Intensive lifestyle modification  · May benefit from weight loss before the abdominal wall repair    Hypercholesterolemia  Assessment & Plan  · Continue statin therapy with Pravachol 20 mg daily  · Follow-up with primary care provider regarding lipid management    Depressive disorder  Assessment & Plan  · Continue Paxil        VTE Pharmacologic Prophylaxis:   Pharmacologic: Heparin  Mechanical VTE Prophylaxis in Place: Yes    Patient Centered Rounds: I have performed bedside rounds with nursing staff today  Discussions with Specialists or Other Care Team Provider:  Surgery    Education and Discussions with Family / Patient:  Patient    Time Spent for Care: 30 minutes  More than 50% of total time spent on counseling and coordination of care as described above  Current Length of Stay: 2 day(s)    Current Patient Status: Inpatient   Certification Statement: The patient will continue to require additional inpatient hospital stay due to IV antibiotics and follow-up on the cultures    Discharge Plan:     Code Status: Level 1 - Full Code      Subjective:   Patient seen and examined  Discussed with surgery  Had I&D done at bedside today by surgery     Deep wound culture done  There is a concern for fistula    Objective:     Vitals:   Temp (24hrs), Av °F (36 7 °C), Min:97 9 °F (36 6 °C), Max:98 1 °F (36 7 °C)    Temp:  [97 9 °F (36 6 °C)-98 1 °F (36 7 °C)] 97 9 °F (36 6 °C)  HR:  [81-84] 81  Resp:  [16-18] 16  BP: (122)/(68-69) 122/69  SpO2:  [95 %-98 %] 95 %  Body mass index is 47 52 kg/m²  Input and Output Summary (last 24 hours):        Intake/Output Summary (Last 24 hours) at 11/24/2019 1151  Last data filed at 11/24/2019 0824  Gross per 24 hour   Intake 340 ml   Output --   Net 340 ml       Physical Exam:     Physical Exam   Constitutional: She appears well-developed  No distress  HENT:   Head: Normocephalic and atraumatic  Eyes: Right eye exhibits no discharge  Neck: No JVD present  Cardiovascular: Normal rate  No murmur heard  Pulmonary/Chest: Effort normal  No respiratory distress  Abdominal: Soft  She exhibits no distension  There is no tenderness  Erythema from the lower part is improving but shifted more to worse that right now, but in general the erythematous area is improving  Status post I&D and the wound is covered with ABD pad   Musculoskeletal: Normal range of motion  Neurological: She is alert  No cranial nerve deficit  Skin: Skin is warm  Additional Data:     Labs:    Results from last 7 days   Lab Units 11/24/19  0522  11/21/19  2334   WBC Thousand/uL 9 71   < > 13 68*   HEMOGLOBIN g/dL 9 7*   < > 11 2*   HEMATOCRIT % 30 3*   < > 35 9   PLATELETS Thousands/uL 356   < > 452*   NEUTROS PCT %  --   --  67   LYMPHS PCT %  --   --  23   MONOS PCT %  --   --  8   EOS PCT %  --   --  1    < > = values in this interval not displayed  Results from last 7 days   Lab Units 11/24/19  0520  11/21/19  2334   POTASSIUM mmol/L 3 9   < > 4 1   CHLORIDE mmol/L 102   < > 100   CO2 mmol/L 25   < > 28   BUN mg/dL 12   < > 18   CREATININE mg/dL 0 57*   < > 0 61   CALCIUM mg/dL 8 9   < > 9 5   ALK PHOS U/L  --   --  121*   ALT U/L  --   --  18   AST U/L  --   --  12    < > = values in this interval not displayed  Results from last 7 days   Lab Units 11/23/19  0622   INR  0 97       * I Have Reviewed All Lab Data Listed Above  * Additional Pertinent Lab Tests Reviewed:  Daisha Liao Admission Reviewed    Imaging:    Imaging Reports Reviewed Today Include:   Imaging Personally Reviewed by Myself Includes:     Recent Cultures (last 7 days): Results from last 7 days   Lab Units 11/21/19  2345 11/21/19  2342 11/21/19  2334   BLOOD CULTURE   --  No Growth at 48 hrs  No Growth at 48 hrs  GRAM STAIN RESULT  2+ Polys*  1+ Gram negative rods*  --   --    URINE CULTURE  Culture too young- will reincubate  --   --    WOUND CULTURE  1+ Growth of Klebsiella pneumoniae*  1+ Growth of   --   --        Last 24 Hours Medication List:     Current Facility-Administered Medications:  acetaminophen 650 mg Oral Q6H PRN Yumiko S Hugh, CRNP    cefazolin 2,000 mg Intravenous Q8H Nel Oleary MD Last Rate: 2,000 mg (11/24/19 1560)   heparin (porcine) 5,000 Units Subcutaneous [facility] Nel Oleary MD    hydrALAZINE 5 mg Intravenous Q6H PRN Yumiko S Hugh, CRNP    insulin glargine 8 Units Subcutaneous HS Nel Oleary MD    insulin lispro 1-5 Units Subcutaneous TID AC Yumiko S Hugh, CRNP    insulin lispro 1-5 Units Subcutaneous HS Yumiko S Hugh, CRNP    insulin lispro 2 Units Subcutaneous TID With Meals Nel Oleary MD    metoprolol succinate 100 mg Oral Daily Yumiko S Hugh, CRNP    ondansetron 4 mg Intravenous Q6H PRN Yumiko S Hugh, CRNP    PARoxetine 10 mg Oral Daily Yumiko S Hugh, CRNP    pravastatin 20 mg Oral Daily With Dinner Yumiko S Hugh, CRNP         Today, Patient Was Seen By: Nel Oleary MD    ** Please Note: Dictation voice to text software may have been used in the creation of this document   **

## 2019-11-24 NOTE — ASSESSMENT & PLAN NOTE
· Present on admission  · History abdominal wall abscess and cellulitis June 2019 status post treatment by General surgery with formal drainage and washout at [facility]  · Presents with 5 months of discharge from small hole than abdominal wall and over the past 3 days development of erythema, edema, severe tenderness and purulent discharge  · CT- IMPRESSION:  Very large multiloculated ventral abdominal hernia containing multiple loops of nonobstructed small large bowel  Between 3 abdominal wall hernia lobules there is a subcutaneous fluid and air collection measuring 5 2 x 3 2 x 4 6 cm with adjacent fat stranding and skin thickening likely due to an abscess with overlying cellulitis  · Patient also with leukocytosis, blood culture remained negative  Wound culture is growing says Klebsiella  Antibiotics transition to high-dose cefazolin  · Had I&D done today by surgery    Follow-up for the deep culture

## 2019-11-24 NOTE — ASSESSMENT & PLAN NOTE
Lab Results   Component Value Date    HGBA1C 7 7 (H) 11/21/2019       Recent Labs     11/23/19  1651 11/23/19  2119 11/24/19  0737 11/24/19  1106   POCGLU 168* 168* 138 178*       Blood Sugar Average: Last 72 hrs:  (P) 517 [id number]   Blood sugar still elevated  Will increase the Lantus to 80 units and add 2 units of Humalog mealtime  Continue with the sliding scale monitor blood sugar and adjust the dose of insulin as needed    Last hemoglobin A1c is 7 7

## 2019-11-25 LAB
ANION GAP SERPL CALCULATED.3IONS-SCNC: 8 MMOL/L (ref 4–13)
BUN SERPL-MCNC: 13 MG/DL (ref 5–25)
CALCIUM SERPL-MCNC: 9 MG/DL (ref 8.3–10.1)
CHLORIDE SERPL-SCNC: 100 MMOL/L (ref 100–108)
CO2 SERPL-SCNC: 27 MMOL/L (ref 21–32)
CREAT SERPL-MCNC: 0.59 MG/DL (ref 0.6–1.3)
ERYTHROCYTE [DISTWIDTH] IN BLOOD BY AUTOMATED COUNT: 14.1 % (ref 11.6–15.1)
GFR SERPL CREATININE-BSD FRML MDRD: 101 ML/MIN/1.73SQ M
GLUCOSE SERPL-MCNC: 127 MG/DL (ref 65–140)
GLUCOSE SERPL-MCNC: 151 MG/DL (ref 65–140)
GLUCOSE SERPL-MCNC: 158 MG/DL (ref 65–140)
GLUCOSE SERPL-MCNC: 166 MG/DL (ref 65–140)
GLUCOSE SERPL-MCNC: 171 MG/DL (ref 65–140)
HCT VFR BLD AUTO: 33.2 % (ref 34.8–46.1)
HGB BLD-MCNC: 10.5 G/DL (ref 11.5–15.4)
MCH RBC QN AUTO: 25.9 PG (ref 26.8–34.3)
MCHC RBC AUTO-ENTMCNC: 31.6 G/DL (ref 31.4–37.4)
MCV RBC AUTO: 82 FL (ref 82–98)
PLATELET # BLD AUTO: 379 THOUSANDS/UL (ref 149–390)
PMV BLD AUTO: 8.6 FL (ref 8.9–12.7)
POTASSIUM SERPL-SCNC: 3.9 MMOL/L (ref 3.5–5.3)
RBC # BLD AUTO: 4.06 MILLION/UL (ref 3.81–5.12)
SODIUM SERPL-SCNC: 135 MMOL/L (ref 136–145)
WBC # BLD AUTO: 10.57 THOUSAND/UL (ref 4.31–10.16)

## 2019-11-25 PROCEDURE — 82948 REAGENT STRIP/BLOOD GLUCOSE: CPT

## 2019-11-25 PROCEDURE — 85027 COMPLETE CBC AUTOMATED: CPT | Performed by: FAMILY MEDICINE

## 2019-11-25 PROCEDURE — 80048 BASIC METABOLIC PNL TOTAL CA: CPT | Performed by: FAMILY MEDICINE

## 2019-11-25 PROCEDURE — 99232 SBSQ HOSP IP/OBS MODERATE 35: CPT | Performed by: FAMILY MEDICINE

## 2019-11-25 RX ORDER — INSULIN GLARGINE 100 [IU]/ML
10 INJECTION, SOLUTION SUBCUTANEOUS
Status: DISCONTINUED | OUTPATIENT
Start: 2019-11-25 | End: 2019-11-26 | Stop reason: HOSPADM

## 2019-11-25 RX ADMIN — PRAVASTATIN SODIUM 20 MG: 20 TABLET ORAL at 18:22

## 2019-11-25 RX ADMIN — CEFAZOLIN SODIUM 2000 MG: 2 SOLUTION INTRAVENOUS at 00:29

## 2019-11-25 RX ADMIN — HEPARIN SODIUM 5000 UNITS: 5000 INJECTION INTRAVENOUS; SUBCUTANEOUS at 22:25

## 2019-11-25 RX ADMIN — CEFAZOLIN SODIUM 2000 MG: 2 SOLUTION INTRAVENOUS at 18:23

## 2019-11-25 RX ADMIN — INSULIN GLARGINE 10 UNITS: 100 INJECTION, SOLUTION SUBCUTANEOUS at 22:24

## 2019-11-25 RX ADMIN — INSULIN LISPRO 2 UNITS: 100 INJECTION, SOLUTION INTRAVENOUS; SUBCUTANEOUS at 08:15

## 2019-11-25 RX ADMIN — HEPARIN SODIUM 5000 UNITS: 5000 INJECTION INTRAVENOUS; SUBCUTANEOUS at 06:00

## 2019-11-25 RX ADMIN — METRONIDAZOLE 500 MG: 500 INJECTION, SOLUTION INTRAVENOUS at 18:23

## 2019-11-25 RX ADMIN — CEFAZOLIN SODIUM 2000 MG: 2 SOLUTION INTRAVENOUS at 08:20

## 2019-11-25 RX ADMIN — INSULIN LISPRO 1 UNITS: 100 INJECTION, SOLUTION INTRAVENOUS; SUBCUTANEOUS at 18:23

## 2019-11-25 RX ADMIN — PAROXETINE 10 MG: 20 TABLET, FILM COATED ORAL at 08:16

## 2019-11-25 RX ADMIN — HEPARIN SODIUM 5000 UNITS: 5000 INJECTION INTRAVENOUS; SUBCUTANEOUS at 13:39

## 2019-11-25 RX ADMIN — INSULIN LISPRO 1 UNITS: 100 INJECTION, SOLUTION INTRAVENOUS; SUBCUTANEOUS at 08:15

## 2019-11-25 RX ADMIN — METOPROLOL SUCCINATE 100 MG: 25 TABLET, EXTENDED RELEASE ORAL at 08:16

## 2019-11-25 RX ADMIN — INSULIN LISPRO 1 UNITS: 100 INJECTION, SOLUTION INTRAVENOUS; SUBCUTANEOUS at 11:51

## 2019-11-25 RX ADMIN — METRONIDAZOLE 500 MG: 500 INJECTION, SOLUTION INTRAVENOUS at 09:38

## 2019-11-25 NOTE — ASSESSMENT & PLAN NOTE
· History of SVT, status post ablation 5 years ago at [facility]  · Maintained on Toprol XL  · Remained in sinus rhythm in the hospital

## 2019-11-25 NOTE — PROGRESS NOTES
Progress Note - America Mcfarland 1960, 61 y o  female MRN: [id number]    Unit/Bed#: -Jose Encounter: [id number]    Primary Care Provider: Vincent Chen MD   Date and time admitted to hospital: 11/21/2019 11:23 PM        * Cutaneous abscess of abdominal wall  Assessment & Plan  · Present on admission  · History abdominal wall abscess and cellulitis June 2019, status post treatment by General surgery with formal drainage and washout at [facility]  · Presents with 5 months of discharge from small opening in the   abdominal wall and over the past 3 days development of erythema, edema, severe tenderness and purulent discharge  · CT- IMPRESSION:  Very large multiloculated ventral abdominal hernia containing multiple loops of nonobstructed small large bowel  Between 3 abdominal wall hernia lobules there is a subcutaneous fluid and air collection measuring 5 2 x 3 2 x 4 6 cm with adjacent fat stranding and skin thickening likely due to an abscess with overlying cellulitis  · Patient also with leukocytosis, blood culture remained negative  Wound culture is growing says Klebsiella  Antibiotics transition to high-dose cefazolin and flagyl   · Had I&D done on 11/24 by surgery  Follow-up  the deep culture  · Wound is packed by surgery -     Anemia  Assessment & Plan  · Monitor H andH  · Likely related to acute illness  · Outpatient GI workup    Type 2 diabetes mellitus [facility])  Assessment & Plan  Lab Results   Component Value Date    HGBA1C 7 7 (H) 11/21/2019       Recent Labs     11/24/19  1605 11/24/19  2151 11/25/19  0725 11/25/19  1124   POCGLU 144* 131 166* 171*       Blood Sugar Average: Last 72 hrs:  (P) 143 [id number]   Blood sugar still elevated  Will increase the Lantus to 10 units and  3 units of Humalog mealtime  Continue with the sliding scale monitor blood sugar and adjust the dose of insulin as needed     Last hemoglobin A1c is 7 7    Benign essential hypertension  Assessment & Plan  · Continue Lopressor home dosage  · Trend blood pressures    Supraventricular tachycardia ([address] )  Assessment & Plan  · History of SVT, status post ablation 5 years ago at [facility]  · Maintained on Toprol XL  · Remained in sinus rhythm in the hospital      Morbid obesity with BMI of 45 0-49 9, adult ([address] )  Assessment & Plan  · Intensive lifestyle modification    Hypercholesterolemia  Assessment & Plan  · Continue statin therapy with Pravachol 20 mg daily    Depressive disorder  Assessment & Plan  · Continue Paxil          VTE Pharmacologic Prophylaxis:   Pharmacologic: Heparin  Mechanical VTE Prophylaxis in Place: Yes    Patient Centered Rounds: I have performed bedside rounds with nursing staff today  Discussions with Specialists or Other Care Team Provider:     Education and Discussions with Family / Patient: patient    Time Spent for Care: 30 minutes  More than 50% of total time spent on counseling and coordination of care as described above  Current Length of Stay: 3 day(s)    Current Patient Status: Inpatient   Certification Statement: The patient will continue to require additional inpatient hospital stay due to wound care and iv antibiotics    Discharge Plan: in the 48 hrs    Code Status: Level 1 - Full Code      Subjective:   Patient seen and examined,  Still with pain in the ant abdominal wall , s/p I&D yesterday by surgery   Wound is packed currently  Discussed with surgery-would like Flagyl added to the current antibiotic regimen  Objective:     Vitals:   Temp (24hrs), Av 2 °F (36 8 °C), Min:98 1 °F (36 7 °C), Max:98 2 °F (36 8 °C)    Temp:  [98 1 °F (36 7 °C)-98 2 °F (36 8 °C)] 98 1 °F (36 7 °C)  HR:  [79-86] 79  Resp:  [16-18] 18  BP: (122-125)/(61-75) 125/61  SpO2:  [93 %-98 %] 94 %  Body mass index is 47 52 kg/m²  Input and Output Summary (last 24 hours):        Intake/Output Summary (Last 24 hours) at 2019 1153  Last data filed at 11/25/2019 0857  Gross per 24 hour   Intake 1270 ml   Output --   Net 1270 ml       Physical Exam:     Physical Exam   Constitutional: She appears well-developed  No distress  HENT:   Head: Normocephalic and atraumatic  Eyes: Right eye exhibits no discharge  Left eye exhibits no discharge  Neck: No JVD present  Cardiovascular: Normal rate and regular rhythm  Pulmonary/Chest: Effort normal  No stridor  No respiratory distress  Abdominal: Soft  She exhibits no distension and no mass  There is no tenderness  There is no rebound  A hernia (non reducible hernia ) is present  Abdominal wall incision site packed  Erythema is improving   Musculoskeletal: Normal range of motion  She exhibits no edema  Neurological: She is alert  No cranial nerve deficit  Coordination normal    Skin: Skin is warm  Additional Data:     Labs:    Results from last 7 days   Lab Units 11/25/19  0558  11/21/19  2334   WBC Thousand/uL 10 57*   < > 13 68*   HEMOGLOBIN g/dL 10 5*   < > 11 2*   HEMATOCRIT % 33 2*   < > 35 9   PLATELETS Thousands/uL 379   < > 452*   NEUTROS PCT %  --   --  67   LYMPHS PCT %  --   --  23   MONOS PCT %  --   --  8   EOS PCT %  --   --  1    < > = values in this interval not displayed  Results from last 7 days   Lab Units 11/25/19  0608  11/21/19  2334   POTASSIUM mmol/L 3 9   < > 4 1   CHLORIDE mmol/L 100   < > 100   CO2 mmol/L 27   < > 28   BUN mg/dL 13   < > 18   CREATININE mg/dL 0 59*   < > 0 61   CALCIUM mg/dL 9 0   < > 9 5   ALK PHOS U/L  --   --  121*   ALT U/L  --   --  18   AST U/L  --   --  12    < > = values in this interval not displayed  Results from last 7 days   Lab Units 11/23/19  0622   INR  0 97       * I Have Reviewed All Lab Data Listed Above  * Additional Pertinent Lab Tests Reviewed:  All Labs For Current Hospital Admission Reviewed    Imaging:    Imaging Reports Reviewed Today Include:   Imaging Personally Reviewed by Myself Includes:     Recent Cultures (last 7 days):     Results from last 7 days   Lab Units 11/24/19  0918 11/21/19  2345 11/21/19  2342 11/21/19  2334   BLOOD CULTURE   --   --  No Growth at 72 hrs  No Growth at 72 hrs  GRAM STAIN RESULT  2+ Polys*  Rare Gram negative rods* 2+ Polys*  1+ Gram negative rods*  --   --    URINE CULTURE   --  60,000-69,000 cfu/ml   --   --    WOUND CULTURE   --  1+ Growth of Klebsiella pneumoniae*  1+ Growth of   --   --        Last 24 Hours Medication List:     Current Facility-Administered Medications:  acetaminophen 650 mg Oral Q6H PRN Yumiko S Hugh, CRNP    cefazolin 2,000 mg Intravenous Q8H Nano Fink MD Last Rate: Stopped (11/25/19 0850)   heparin (porcine) 5,000 Units Subcutaneous [facility] Nano Fink MD    hydrALAZINE 5 mg Intravenous Q6H PRN Yumiko S Hugh, CRNP    insulin glargine 8 Units Subcutaneous HS Nano Fink MD    insulin lispro 1-5 Units Subcutaneous TID AC Yumiko S Hugh, CRNP    insulin lispro 1-5 Units Subcutaneous HS Yumiko S Hugh, CRNP    insulin lispro 2 Units Subcutaneous TID With Meals Nano Fink MD    metoprolol succinate 100 mg Oral Daily Yumiko S Hugh, CRNP    metroNIDAZOLE 500 mg Intravenous Q8H Nano Fink MD Last Rate: Stopped (11/25/19 1008)   ondansetron 4 mg Intravenous Q6H PRN Yumiko S Hugh, CRNP    PARoxetine 10 mg Oral Daily Yumiko S Hugh, CRNP    pravastatin 20 mg Oral Daily With Dinner Yumiko S Hugh, CRNP         Today, Patient Was Seen By: Nano Fink MD    ** Please Note: Dictation voice to text software may have been used in the creation of this document   **

## 2019-11-25 NOTE — ASSESSMENT & PLAN NOTE
· Present on admission  · History abdominal wall abscess and cellulitis June 2019, status post treatment by General surgery with formal drainage and washout at [facility]  · Presents with 5 months of discharge from small opening in the   abdominal wall and over the past 3 days development of erythema, edema, severe tenderness and purulent discharge  · CT- IMPRESSION:  Very large multiloculated ventral abdominal hernia containing multiple loops of nonobstructed small large bowel  Between 3 abdominal wall hernia lobules there is a subcutaneous fluid and air collection measuring 5 2 x 3 2 x 4 6 cm with adjacent fat stranding and skin thickening likely due to an abscess with overlying cellulitis  · Patient also with leukocytosis, blood culture remained negative  Wound culture is growing says Klebsiella  Antibiotics transition to high-dose cefazolin and flagyl   · Had I&D done on 11/24 by surgery    Follow-up  the deep culture  · Wound is packed by surgery -

## 2019-11-25 NOTE — PROGRESS NOTES
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  Plan:  Discussed with Dr Ny Martin present here this morning will also examine the patient  Iodoform packing was replaced by nursing this morning  Local wound care and daily dressing change as needed  Recommend the addition of IV metronidazole with present IV Ancef to cover for gram-negative rods  General surgery will continue to follow  Continue local wound care, cover with dry gauze dressing  Await deep wound culture results  The patient verbalized that she does want to maintain her recommendation for follow-up with [facility] general surgery, [phone] to arrange surgical disposition with them, the patient attempted to call them on her own and they requested referral from provider here at this hospital, telephone message was left by this provider to them  Subjective/Objective   Chief Complaint:  Patient still continues with drainage from the abdominal wound  She had I and D at bedside with placement of iodoform packing yesterday by Dr Debby Middleton  The patient remains afebrile  The patient has had multiple abdominal surgeries in the past   Patient had previous I and D of the periumbilical area done in June of 2019  She has been admitted to [facility] then and the abscess formation in the abdominal wall was drained by general surgery at that time  Area continues with chronic drainage she is admitted here on November 21st with surrounding cellulitis and foul odor to the drainage    General surgery from [place] had recommended that the patient developed an enterocutaneous fistula as the cause for her chronic drainage  They had made recommendations that she establish further care with general surgery at [facility], as she would likely need extensive abdominal wall reconstruction and takedown of the EC fistula  At this point the patient had not contacted her she to set up an appointment before she was admitted here  Subjective:  51-year-old obese white female admitted with abdominal wall cellulitis on November 21st had I and D at bedside with placement of iodoform packing yesterday by Dr Syed Barone  The patient remains afebrile  The patient has had multiple abdominal surgeries in the past   Patient had previous I and D of the periumbilical area done in June of 2019 from what she described as a spider bite  She has been admitted to [facility] then and the abscess formation in the abdominal wall was drained by general surgery at that time  Area continues with chronic drainage and foul odor to the drainage  General surgery from [facility] had recommended that the patient developed an enterocutaneous fistula as the cause for her chronic drainage  They had made recommendations that she establish further care with general surgery at [facility], as she would likely need extensive abdominal wall reconstruction and takedown of the EC fistula  At this point the patient had not contacted her she to set up an appointment before she was admitted here          Scheduled Meds:  Current Facility-Administered Medications:  acetaminophen 650 mg Oral Q6H PRN YAMILE Bhandari    cefazolin 2,000 mg Intravenous Q8H Piper Urbina MD Last Rate: 2,000 mg (11/25/19 0820)   heparin (porcine) 5,000 Units Subcutaneous [facility] Piper Urbina MD    hydrALAZINE 5 mg Intravenous Q6H PRN YAMILE Bhandari    insulin glargine 8 Units Subcutaneous HS Piper Urbina MD    insulin lispro 1-5 Units Subcutaneous TID AC Yumiko S Hugh, CRNP    insulin lispro 1-5 Units Subcutaneous HS Yumiko S Hugh, CRNP    insulin lispro 2 Units Subcutaneous TID With Meals Marcel Cruz MD    metoprolol succinate 100 mg Oral Daily Yumiko S Hugh, CRNP    metroNIDAZOLE 500 mg Intravenous Q8H Marcel Cruz MD    ondansetron 4 mg Intravenous Q6H PRN Yumiko S Hugh, CRNP    PARoxetine 10 mg Oral Daily Yumiko S Hugh, CRNP    pravastatin 20 mg Oral Daily With Dinner Yumiko S Hugh, CRNP      Continuous Infusions:   PRN Meds:   acetaminophen    hydrALAZINE    ondansetron    Objective:     Blood pressure 125/61, pulse 79, temperature 98 1 °F (36 7 °C), resp  rate 18, height 5' 7" (1 702 m), weight (!) 138 kg (303 lb 6 4 oz), SpO2 94 %  ,Body mass index is 47 52 kg/m²  Intake/Output Summary (Last 24 hours) at 11/25/2019 0858  Last data filed at 11/25/2019 0857  Gross per 24 hour   Intake 1270 ml   Output --   Net 1270 ml       Invasive Devices     Peripheral Intravenous Line            Peripheral IV 11/25/19 Right Hand less than 1 day                Physical Exam:  Patient is awake alert  Sacrum bedside chair  Head normocephalic  She is fully oriented  ENT clear  Oral mucosa is pink and moist   Heart regular rate and rhythm  No murmur gallop  Lungs clear to auscultation  Back no flank tenderness to percussion  Abdomen is obese in configuration  Positive bowel sounds are heard  The abdomen is soft  The abdominal ABD dressing was removed  There is quarter-inch iodoform packing placed in the incision and dry at present  Patient believes that there is improvement in the surrounding erythema around the periumbilical area  There was no crepitus or fluctuance with palpation  Very minimal tenderness is present  No drainage can be expressed  Lab, Imaging and other studies:  I have personally reviewed pertinent lab results    , CBC:   Lab Results   Component Value Date    WBC 10 57 (H) 11/25/2019    HGB 10 5 (L) 11/25/2019    HCT 33 2 (L) 11/25/2019    MCV 82 11/25/2019     11/25/2019    MCH 25 9 (L) 11/25/2019    MCHC 31 6 11/25/2019    RDW 14 1 11/25/2019    MPV 8 6 (L) 11/25/2019   , CMP:   Lab Results   Component Value Date    SODIUM 135 (L) 11/25/2019    K 3 9 11/25/2019     11/25/2019    CO2 27 11/25/2019    BUN 13 11/25/2019    CREATININE 0 59 (L) 11/25/2019    CALCIUM 9 0 11/25/2019    EGFR 101 11/25/2019     Wound culture and Gram stain   Order: [id number]   Status:  Final result   Visible to patient:  No (Not Released)   Next appt:  None   Specimen Information: Abdominal; Wound        Wound Culture 1+ Growth of Klebsiella pneumoniaeAbnormal        1+ Growth of     Mixed Skin [place]          GRAM STAIN RESULT  Abnormal    2+ Polys      1+ Gram negative rods            Susceptibility      Klebsiella pneumoniae     JOANN     Amoxicillin + Clavulanate <=4/2 ug/ml Susceptible     Ampicillin ($$) >16 00 ug/ml Resistant     Ampicillin + Sulbactam ($) 8/4 ug/ml Susceptible     Aztreonam ($$$)  <=4 ug/ml Susceptible     Cefazolin ($) <=2 00 ug/ml Susceptible     Ciprofloxacin ($)  <=1 00 ug/ml Susceptible     Gentamicin ($$) <=1 ug/ml Susceptible     Levofloxacin ($) <=0 25 ug/ml Susceptible     Tetracycline <=4 ug/ml Susceptible     Tobramycin ($) <=1 ug/ml Susceptible     Trimethoprim + Sulfamethoxazole ($$$) <=2/38 ug/ml Susceptible     ZID Performed Yes                   Specimen Collected: 11/21/19 23:45   Last Resulted: 11/24/19 07:25           VTE Pharmacologic Prophylaxis: Heparin  VTE Mechanical Prophylaxis: sequential compression device     Michelle Lilly PA-C

## 2019-11-25 NOTE — ASSESSMENT & PLAN NOTE
Lab Results   Component Value Date    HGBA1C 7 7 (H) 11/21/2019       Recent Labs     11/24/19  1605 11/24/19  2151 11/25/19  0725 11/25/19  1124   POCGLU 144* 131 166* 171*       Blood Sugar Average: Last 72 hrs:  (P) 071 [id number]   Blood sugar still elevated  Will increase the Lantus to 10 units and  3 units of Humalog mealtime  Continue with the sliding scale monitor blood sugar and adjust the dose of insulin as needed     Last hemoglobin A1c is 7 7

## 2019-11-25 NOTE — PLAN OF CARE
Problem: PAIN - ADULT  Goal: Verbalizes/displays adequate comfort level or baseline comfort level  Description  Interventions:  - Encourage patient to monitor pain and request assistance  - Assess pain using appropriate pain scale  - Administer analgesics based on type and severity of pain and evaluate response  - Implement non-pharmacological measures as appropriate and evaluate response  - Consider cultural and social influences on pain and pain management  - Notify physician/advanced practitioner if interventions unsuccessful or patient reports new pain  Outcome: Progressing     Problem: INFECTION - ADULT  Goal: Absence or prevention of progression during hospitalization  Description  INTERVENTIONS:  - Assess and monitor for signs and symptoms of infection  - Monitor lab/diagnostic results  - Monitor all insertion sites, i e  indwelling lines, tubes, and drains  - Monitor endotracheal if appropriate and nasal secretions for changes in amount and color  - [place] appropriate cooling/warming therapies per order  - Administer medications as ordered  - Instruct and encourage patient and family to use good hand hygiene technique  - Identify and instruct in appropriate isolation precautions for identified infection/condition  Outcome: Progressing  Goal: Absence of fever/infection during neutropenic period  Description  INTERVENTIONS:  - Monitor WBC    Outcome: Progressing     Problem: SAFETY ADULT  Goal: Patient will remain free of falls  Description  INTERVENTIONS:  - Assess patient frequently for physical needs  -  Identify cognitive and physical deficits and behaviors that affect risk of falls    -  [place] fall precautions as indicated by assessment   - Educate patient/family on patient safety including physical limitations  - Instruct patient to call for assistance with activity based on assessment  - Modify environment to reduce risk of injury  - Consider OT/PT consult to assist with strengthening/mobility  Outcome: Progressing  Goal: Maintain or return to baseline ADL function  Description  INTERVENTIONS:  -  Assess patient's ability to carry out ADLs; assess patient's baseline for ADL function and identify physical deficits which impact ability to perform ADLs (bathing, care of mouth/teeth, toileting, grooming, dressing, etc )  - Assess/evaluate cause of self-care deficits   - Assess range of motion  - Assess patient's mobility; develop plan if impaired  - Assess patient's need for assistive devices and provide as appropriate  - Encourage maximum independence but intervene and supervise when necessary  - Involve family in performance of ADLs  - Assess for home care needs following discharge   - Consider OT consult to assist with ADL evaluation and planning for discharge  - Provide patient education as appropriate  Outcome: Progressing  Goal: Maintain or return mobility status to optimal level  Description  INTERVENTIONS:  - Assess patient's baseline mobility status (ambulation, transfers, stairs, etc )    - Identify cognitive and physical deficits and behaviors that affect mobility  - Identify mobility aids required to assist with transfers and/or ambulation (gait belt, sit-to-stand, lift, walker, cane, etc )  - [place] fall precautions as indicated by assessment  - Record patient progress and toleration of activity level on Mobility SBAR; progress patient to next Phase/Stage  - Instruct patient to call for assistance with activity based on assessment  - Consider rehabilitation consult to assist with strengthening/weightbearing, etc   Outcome: Progressing     Problem: DISCHARGE PLANNING  Goal: Discharge to home or other facility with appropriate resources  Description  INTERVENTIONS:  - Identify barriers to discharge w/patient and caregiver  - Arrange for needed discharge resources and transportation as appropriate  - Identify discharge learning needs (meds, wound care, etc )  - Arrange for interpretive services to assist at discharge as needed  - Refer to Case Management Department for coordinating discharge planning if the patient needs post-hospital services based on physician/advanced practitioner order or complex needs related to functional status, cognitive ability, or social support system  Outcome: Progressing     Problem: Knowledge Deficit  Goal: Patient/family/caregiver demonstrates understanding of disease process, treatment plan, medications, and discharge instructions  Description  Complete learning assessment and assess knowledge base  Interventions:  - Provide teaching at level of understanding  - Provide teaching via preferred learning methods  Outcome: Progressing     Problem: Nutrition/Hydration-ADULT  Goal: Nutrient/Hydration intake appropriate for improving, restoring or maintaining nutritional needs  Description  Monitor and assess patient's nutrition/hydration status for malnutrition  Collaborate with interdisciplinary team and initiate plan and interventions as ordered  Monitor patient's weight and dietary intake as ordered or per policy  Utilize nutrition screening tool and intervene as necessary  Determine patient's food preferences and provide high-protein, high-caloric foods as appropriate       INTERVENTIONS:  - Monitor oral intake, urinary output, labs, and treatment plans  - Assess nutrition and hydration status and recommend course of action  - Evaluate amount of meals eaten  - Assist patient with eating if necessary   - Allow adequate time for meals  - Recommend/ encourage appropriate diets, oral nutritional supplements, and vitamin/mineral supplements  - Order, calculate, and assess calorie counts as needed  - Recommend, monitor, and adjust tube feedings and TPN/PPN based on assessed needs  - Assess need for intravenous fluids  - Provide specific nutrition/hydration education as appropriate  - Include patient/family/caregiver in decisions related to nutrition  Outcome: Progressing     Problem: Potential for Falls  Goal: Patient will remain free of falls  Description  INTERVENTIONS:  - Assess patient frequently for physical needs  -  Identify cognitive and physical deficits and behaviors that affect risk of falls    -  [place] fall precautions as indicated by assessment   - Educate patient/family on patient safety including physical limitations  - Instruct patient to call for assistance with activity based on assessment  - Modify environment to reduce risk of injury  - Consider OT/PT consult to assist with strengthening/mobility  Outcome: Progressing

## 2019-11-26 VITALS
SYSTOLIC BLOOD PRESSURE: 121 MMHG | DIASTOLIC BLOOD PRESSURE: 54 MMHG | OXYGEN SATURATION: 95 % | RESPIRATION RATE: 18 BRPM | HEIGHT: 67 IN | BODY MASS INDEX: 45.99 KG/M2 | WEIGHT: 293 LBS | TEMPERATURE: 98 F | HEART RATE: 86 BPM

## 2019-11-26 LAB
ANION GAP SERPL CALCULATED.3IONS-SCNC: 9 MMOL/L (ref 4–13)
ATRIAL RATE: 92 BPM
BACTERIA WND AEROBE CULT: ABNORMAL
BASOPHILS # BLD AUTO: 0.03 THOUSANDS/ΜL (ref 0–0.1)
BASOPHILS NFR BLD AUTO: 0 % (ref 0–1)
BUN SERPL-MCNC: 12 MG/DL (ref 5–25)
CALCIUM SERPL-MCNC: 8.7 MG/DL (ref 8.3–10.1)
CHLORIDE SERPL-SCNC: 99 MMOL/L (ref 100–108)
CO2 SERPL-SCNC: 28 MMOL/L (ref 21–32)
CREAT SERPL-MCNC: 0.77 MG/DL (ref 0.6–1.3)
EOSINOPHIL # BLD AUTO: 0.09 THOUSAND/ΜL (ref 0–0.61)
EOSINOPHIL NFR BLD AUTO: 1 % (ref 0–6)
ERYTHROCYTE [DISTWIDTH] IN BLOOD BY AUTOMATED COUNT: 14.1 % (ref 11.6–15.1)
GFR SERPL CREATININE-BSD FRML MDRD: 85 ML/MIN/1.73SQ M
GLUCOSE SERPL-MCNC: 131 MG/DL (ref 65–140)
GLUCOSE SERPL-MCNC: 165 MG/DL (ref 65–140)
GLUCOSE SERPL-MCNC: 238 MG/DL (ref 65–140)
GRAM STN SPEC: ABNORMAL
GRAM STN SPEC: ABNORMAL
HCT VFR BLD AUTO: 33.1 % (ref 34.8–46.1)
HGB BLD-MCNC: 10.5 G/DL (ref 11.5–15.4)
IMM GRANULOCYTES # BLD AUTO: 0.06 THOUSAND/UL (ref 0–0.2)
IMM GRANULOCYTES NFR BLD AUTO: 1 % (ref 0–2)
LYMPHOCYTES # BLD AUTO: 1.91 THOUSANDS/ΜL (ref 0.6–4.47)
LYMPHOCYTES NFR BLD AUTO: 21 % (ref 14–44)
MCH RBC QN AUTO: 25.9 PG (ref 26.8–34.3)
MCHC RBC AUTO-ENTMCNC: 31.7 G/DL (ref 31.4–37.4)
MCV RBC AUTO: 82 FL (ref 82–98)
MONOCYTES # BLD AUTO: 0.54 THOUSAND/ΜL (ref 0.17–1.22)
MONOCYTES NFR BLD AUTO: 6 % (ref 4–12)
NEUTROPHILS # BLD AUTO: 6.56 THOUSANDS/ΜL (ref 1.85–7.62)
NEUTS SEG NFR BLD AUTO: 71 % (ref 43–75)
NRBC BLD AUTO-RTO: 0 /100 WBCS
P AXIS: 40 DEGREES
PLATELET # BLD AUTO: 375 THOUSANDS/UL (ref 149–390)
PMV BLD AUTO: 8.8 FL (ref 8.9–12.7)
POTASSIUM SERPL-SCNC: 4 MMOL/L (ref 3.5–5.3)
PR INTERVAL: 146 MS
QRS AXIS: 43 DEGREES
QRSD INTERVAL: 86 MS
QT INTERVAL: 356 MS
QTC INTERVAL: 440 MS
RBC # BLD AUTO: 4.06 MILLION/UL (ref 3.81–5.12)
SODIUM SERPL-SCNC: 136 MMOL/L (ref 136–145)
T WAVE AXIS: 3 DEGREES
VENTRICULAR RATE: 92 BPM
WBC # BLD AUTO: 9.19 THOUSAND/UL (ref 4.31–10.16)

## 2019-11-26 PROCEDURE — 80048 BASIC METABOLIC PNL TOTAL CA: CPT | Performed by: PHYSICIAN ASSISTANT

## 2019-11-26 PROCEDURE — 85025 COMPLETE CBC W/AUTO DIFF WBC: CPT | Performed by: PHYSICIAN ASSISTANT

## 2019-11-26 PROCEDURE — 82948 REAGENT STRIP/BLOOD GLUCOSE: CPT

## 2019-11-26 PROCEDURE — 99239 HOSP IP/OBS DSCHRG MGMT >30: CPT | Performed by: FAMILY MEDICINE

## 2019-11-26 RX ORDER — METRONIDAZOLE 500 MG/1
500 TABLET ORAL EVERY 8 HOURS SCHEDULED
Qty: 21 TABLET | Refills: 0 | Status: SHIPPED | OUTPATIENT
Start: 2019-11-26 | End: 2019-11-26 | Stop reason: SDUPTHER

## 2019-11-26 RX ORDER — CEPHALEXIN 500 MG/1
500 CAPSULE ORAL EVERY 8 HOURS SCHEDULED
Qty: 21 CAPSULE | Refills: 0 | Status: SHIPPED | OUTPATIENT
Start: 2019-11-26 | End: 2019-11-26 | Stop reason: SDUPTHER

## 2019-11-26 RX ORDER — METRONIDAZOLE 500 MG/1
500 TABLET ORAL EVERY 8 HOURS SCHEDULED
Qty: 21 TABLET | Refills: 0 | Status: SHIPPED | OUTPATIENT
Start: 2019-11-26 | End: 2019-12-05 | Stop reason: HOSPADM

## 2019-11-26 RX ORDER — CEPHALEXIN 500 MG/1
500 CAPSULE ORAL EVERY 8 HOURS SCHEDULED
Qty: 21 CAPSULE | Refills: 0 | Status: SHIPPED | OUTPATIENT
Start: 2019-11-26 | End: 2019-12-05 | Stop reason: HOSPADM

## 2019-11-26 RX ADMIN — PAROXETINE 10 MG: 20 TABLET, FILM COATED ORAL at 08:10

## 2019-11-26 RX ADMIN — CEFAZOLIN SODIUM 2000 MG: 2 SOLUTION INTRAVENOUS at 01:34

## 2019-11-26 RX ADMIN — METOPROLOL SUCCINATE 100 MG: 25 TABLET, EXTENDED RELEASE ORAL at 08:05

## 2019-11-26 RX ADMIN — METRONIDAZOLE 500 MG: 500 INJECTION, SOLUTION INTRAVENOUS at 00:40

## 2019-11-26 RX ADMIN — HEPARIN SODIUM 5000 UNITS: 5000 INJECTION INTRAVENOUS; SUBCUTANEOUS at 05:38

## 2019-11-26 RX ADMIN — INSULIN LISPRO 1 UNITS: 100 INJECTION, SOLUTION INTRAVENOUS; SUBCUTANEOUS at 12:10

## 2019-11-26 RX ADMIN — HEPARIN SODIUM 5000 UNITS: 5000 INJECTION INTRAVENOUS; SUBCUTANEOUS at 13:09

## 2019-11-26 RX ADMIN — CEFAZOLIN SODIUM 2000 MG: 2 SOLUTION INTRAVENOUS at 09:02

## 2019-11-26 RX ADMIN — METRONIDAZOLE 500 MG: 500 INJECTION, SOLUTION INTRAVENOUS at 07:56

## 2019-11-26 NOTE — ASSESSMENT & PLAN NOTE
Lab Results   Component Value Date    HGBA1C 7 7 (H) 11/21/2019       Recent Labs     11/25/19  1607 11/25/19  2121 11/26/19  0743 11/26/19  1131   POCGLU 158* 151* 131 165*       Blood Sugar Average: Last 72 hrs:  (P) 382 [id number]     Last hemoglobin A1c is 7 7  Resume home regimen  Follow up closely with PCP

## 2019-11-26 NOTE — QUICK NOTE
This provider received a return phone call late yesterday afternoon from general surgery at [facility] with regards to follow-up disposition for this patient after hospital discharge  Historical information was provided to [facility] with regard for referral to be seen by general surgery there at the patient request and also upon recommendation by her surgeon at [facility] [place] in [place] when she had been hospitalized there earlier this year  The patient has a probable enterocutaneous fistula which is chronically been draining for several months  The patient tried to call her she on her own to set up an appointment, she had not been seen with them prior to this  General surgery from [facility] said they would discuss with the surgeon there on the service and that they would personally contact the patient to set up an appointment in the next 1-2 weeks to be seen there for follow-up      Diego Davis PA-C

## 2019-11-26 NOTE — PLAN OF CARE
Problem: Nutrition/Hydration-ADULT  Goal: Nutrient/Hydration intake appropriate for improving, restoring or maintaining nutritional needs  Description  Monitor and assess patient's nutrition/hydration status for malnutrition  Collaborate with interdisciplinary team and initiate plan and interventions as ordered  Monitor patient's weight and dietary intake as ordered or per policy  Utilize nutrition screening tool and intervene as necessary  Determine patient's food preferences and provide high-protein, high-caloric foods as appropriate       INTERVENTIONS:  - Monitor oral intake, urinary output, labs, and treatment plans  - Assess nutrition and hydration status and recommend course of action/encourage protein rich foods to aid in wound healing  - Evaluate amount of meals eaten  -     - Recommend/ encourage appropriate diets, oral nutritional supplements, and vitamin/mineral supplements  -  -  - Provide specific nutrition/hydration education as appropriate  - Include patient/family/caregiver in decisions related to nutrition   Outcome: Progressing

## 2019-11-26 NOTE — ASSESSMENT & PLAN NOTE
· Present on admission  · History abdominal wall abscess and cellulitis June 2019, status post treatment by General surgery with formal drainage and washout at [facility]  · Presents with 5 months of discharge from small opening in the   abdominal wall and over the past 3 days development of erythema, edema, severe tenderness and purulent discharge  · CT- IMPRESSION:  Very large multiloculated ventral abdominal hernia containing multiple loops of nonobstructed small large bowel  Between 3 abdominal wall hernia lobules there is a subcutaneous fluid and air collection measuring 5 2 x 3 2 x 4 6 cm with adjacent fat stranding and skin thickening likely due to an abscess with overlying cellulitis  · Patient also with leukocytosis, blood culture remained negative  · Had I&D done on 11/24 by surgery  Wound cultures reviewed  Antibiotics adjusted to Keflex and Flagyl to complete a 7-day course   · The patient is to have daily wound dressing per surgery's recommendations  The patient will be contacted by [place] surgical team to schedule surgery  She will need to proceed with daily wound dressing until then  Home VNA requested

## 2019-11-26 NOTE — SOCIAL WORK
[facility] recommended for care of pts wound  Pt requesting referrals be placed to [facility], [facility] and [facility] to follow    A post acute care recommendation was made by your care team for [facility]  Discussed [place] of Choice with patient  List of agencies given to patient via in person  patient aware the list is custom filtered for them by zip code location and that [facility] post acute providers are designated

## 2019-11-26 NOTE — PROGRESS NOTES
Progress Note - General Surgery   Shravan Pacheco 61 y o  female MRN: [id number]  Unit/Bed#: -01 Encounter: [id number]    Assessment:  POD#2 I&D of abdominal wound  Improving erythema  Packing with scant purulent drainage  Wound without foul odor      Plan:  Continue local wound care  Continue antibiotics per ID  Follow qAM CBC  Follow cultures  Anticipate pt undergoing abdominal reconstruction at [facility] to determine whether this will be done acutely or electively at a later date  Awaiting correspondence from them  Subjective/Objective   Chief Complaint: POD#2 I&D abdominal wound    Subjective: No acute events overnight  Denies fever and chills  Tolerating PO intake  Continues passing gas and moving bowels  Denies chest pain and SOB  Is ambulating independently  Objective:     Blood pressure 121/54, pulse 86, temperature 98 °F (36 7 °C), resp  rate 18, height 5' 7" (1 702 m), weight (!) 138 kg (303 lb 6 4 oz), SpO2 95 %  ,Body mass index is 47 52 kg/m²  Intake/Output Summary (Last 24 hours) at 11/26/2019 0923  Last data filed at 11/26/2019 0204  Gross per 24 hour   Intake 630 ml   Output    Net 630 ml       Invasive Devices     Peripheral Intravenous Line            Peripheral IV 11/25/19 Right Hand 1 day                Physical Exam:  General: No apparent distress  Sitting bedside eating breakfast this morning  Abdomen: Normal BS x4  Improving abdominal wall erythema  Packing with scant purulent drainage  No foul odor  Nontender to touch  Lab, Imaging and other studies:I have personally reviewed pertinent lab results    Morning labs pending    Jose Andrade PA-C

## 2019-11-26 NOTE — DISCHARGE SUMMARY
Discharge- Arely Henderson 1960, 61 y o  female MRN: [id number]    Unit/Bed#: -01 Encounter: [id number]    Primary Care Provider: Phil Ibarra MD   Date and time admitted to hospital: 11/21/2019 11:23 PM        * Cutaneous abscess of abdominal wall  Assessment & Plan  · Present on admission  · History abdominal wall abscess and cellulitis June 2019, status post treatment by General surgery with formal drainage and washout at [facility]  · Presents with 5 months of discharge from small opening in the   abdominal wall and over the past 3 days development of erythema, edema, severe tenderness and purulent discharge  · CT- IMPRESSION:  Very large multiloculated ventral abdominal hernia containing multiple loops of nonobstructed small large bowel  Between 3 abdominal wall hernia lobules there is a subcutaneous fluid and air collection measuring 5 2 x 3 2 x 4 6 cm with adjacent fat stranding and skin thickening likely due to an abscess with overlying cellulitis  · Patient also with leukocytosis, blood culture remained negative  · Had I&D done on 11/24 by surgery  Wound cultures reviewed  Antibiotics adjusted to Keflex and Flagyl to complete a 7-day course   · The patient is to have daily wound dressing per surgery's recommendations  The patient will be contacted by [place] surgical team to schedule surgery  She will need to proceed with daily wound dressing until then  Home VNA requested      Anemia  Assessment & Plan  · Mild and stable   · Outpatient GI workup    Type 2 diabetes mellitus [facility])  Assessment & Plan  Lab Results   Component Value Date    HGBA1C 7 7 (H) 11/21/2019       Recent Labs     11/25/19  1607 11/25/19  2121 11/26/19  0743 11/26/19  1131   POCGLU 158* 151* 131 165*       Blood Sugar Average: Last 72 hrs:  (P) 031 [id number]     Last hemoglobin A1c is 7 7  Resume home regimen  Follow up closely with PCP    Benign essential hypertension  Assessment & Plan  · Continue Lopressor home dosage      Supraventricular tachycardia ([address] )  Assessment & Plan  · History of SVT, status post ablation 5 years ago at [facility]  · Maintained on Toprol XL  · Remained in sinus rhythm in the hospital      Morbid obesity with BMI of 45 0-49 9, adult [facility])  Assessment & Plan  · Intensive lifestyle modification    Hypercholesterolemia  Assessment & Plan  · Continue statin therapy with Pravachol 20 mg daily    Depressive disorder  Assessment & Plan  · Continue Paxil        Discharging Physician / Practitioner: Jayla Douglas MD  PCP: Adore Powell MD  Admission Date:   Admission Orders (From admission, onward)     Ordered        11/22/19 0139  Inpatient Admission  Once                   Discharge Date: 11/26/19    Resolved Problems  Date Reviewed: 11/26/2019    None          Consultations During Hospital Stay:  · General surgery    Procedures Performed:   Bedside I&D 11/24/2019    Significant Findings / Test Results:   CT abdomen pelvis with contrast   Final Result by Mary Ellen Camacho MD (11/22 0120)      1  Very large multilobulated ventral abdominal wall hernia containing multiple loops of nonobstructed small and large bowel  2   Between three abdominal wall hernia lobules there is a subcutaneous fluid and air collection measuring 5 2 x 3 2 x 4 6 cm with adjacent fat stranding and skin thickening likely due to an abscess with overlying cellulitis  If there is clinical    concern for enterocutaneous fistula at this location, repeat CT with enteric contrast may be obtained              Workstation performed: [id number]           Results from last 7 days   Lab Units 11/26/19  0950   WBC Thousand/uL 9 19   HEMOGLOBIN g/dL 10 5*   HEMATOCRIT % 33 1*   PLATELETS Thousands/uL 375     Results from last 7 days   Lab Units 11/26/19  0950   SODIUM mmol/L 136   POTASSIUM mmol/L 4 0   CHLORIDE mmol/L 99*   CO2 mmol/L 28   BUN mg/dL 12   CREATININE mg/dL 0  77   CALCIUM mg/dL 8 7       Incidental Findings:   · None     Test Results Pending at Discharge (will require follow up): · None     Outpatient Tests Requested:  · None    Complications:  None    Reason for Admission:  Abdominal pain with drainage    Hospital Course:     Mandi Mccallum is a 61 y o  female patient with history of recurrent abdominal wall abscess and cellulitis who originally presented to the hospital on 11/21/2019 due to abdominal pain and drainage  The patient was admitted to the hospital and treated with broad-spectrum IV antibiotics  General surgery performed an I&D with wound cultures showing growth of Klebsiella the patient was subsequently started on oral Flagyl and Keflex  Due to complex anatomy chronic wound and multiple ventral hernias, the patient will need surgical repair and reconstruction with multiple surgeons including Plastic surgery and General surgery team, the patient will need to follow up with [facility] where this procedure could be scheduled on outpatient basis  Surgical team at [address] [place] [address] skye Araiza was in contact with the [facility] team in a raging with this plan  The patient was stable at that the discharge and was discharged on Flagyl on Keflex to complete 1 week of these antibiotics  She is to proceed with daily wound dressing changes until she is seen at [facility]  Please see above list of diagnoses and related plan for additional information  Condition at Discharge: stable     Discharge Day Visit / Exam:     Subjective:  Patient seen and examined  She reports feeling well and states that she would like to go home  She is ambulating in hallways without difficulty  She denies any pain  She is afebrile  She is tolerating normal diet      Vitals: Blood Pressure: 121/54 (11/26/19 0804)  Pulse: 86 (11/26/19 0804)  Temperature: 98 °F (36 7 °C) (11/26/19 0804)  Temp Source: Oral (11/24/19 1447)  Respirations: 18 (11/26/19 6779)  Height: 5' 7" (170 2 cm) (11/22/19 1057)  Weight - Scale: (!) 138 kg (303 lb 6 4 oz) (11/22/19 0204)  SpO2: 95 % (11/26/19 0014)    Exam:     Physical Exam   Constitutional: She is oriented to person, place, and time  No distress  HENT:   Head: Normocephalic and atraumatic  Eyes: Conjunctivae are normal    Neck: No JVD present  Cardiovascular: Normal rate and regular rhythm  No murmur heard  Pulmonary/Chest: Effort normal  No respiratory distress  She has no wheezes  She has no rales  Abdominal: Soft  She exhibits no distension  There is no tenderness  There is no guarding  Abdominal wound dressing dry, clean and intact   Musculoskeletal: She exhibits no edema  Neurological: She is alert and oriented to person, place, and time  Psychiatric: She has a normal mood and affect  Discussion with Family: Patient    Discharge instructions/Information to patient and family:   See after visit summary for information provided to patient and family  Provisions for Follow-Up Care:  See after visit summary for information related to follow-up care and any pertinent home health orders  Disposition:     Home with VNA Services (Reminder: Complete face to face encounter)    For Discharges to [facility]:   · Not Applicable to this Patient - Not Applicable to this Patient    Planned Readmission: No     Discharge Statement:  I spent 35 minutes discharging the patient  This time was spent on the day of discharge  I had direct contact with the patient on the day of discharge  Greater than 50% of the total time was spent examining patient, answering all patient questions, arranging and discussing plan of care with patient as well as directly providing post-discharge instructions  Additional time then spent on discharge activities  Discharge Medications:  See after visit summary for reconciled discharge medications provided to patient and family        ** Please Note: This note has been constructed using a voice recognition system **

## 2019-11-26 NOTE — DISCHARGE INSTRUCTIONS
Enterocutaneous Fistula   WHAT YOU NEED TO KNOW:   A enterocutaneous fistula is an abnormal opening in intestines  Fluids from intestines leak out of your body through an opening in your skin  An enterocutaneous fistula can lead to infections, malnutrition (not enough calories or nutrients), or dehydration  DISCHARGE INSTRUCTIONS:   Seek care immediately if:   · You have severe vomiting or diarrhea  · You have heavy bleeding from your rectum  · Your abdomen is larger than usual and very painful  Contact your healthcare provider if:   · You develop any new symptoms  · You have a fever  · You are losing weight without trying  · You notice a change in your bowel movements  · You feel depressed, confused, tired, irritable, and you do not feel like eating  · You have questions or concerns about your condition or care  Care for your open wound as directed: You may need to keep a bandage over your wound to protect the skin from more damage  You may also need to clean your wound  Ask your healthcare provider how to clean your wound, and when and how to change your bandages  Follow up with your healthcare provider as directed:  Write down your questions so you remember to ask them during your visits  © 2017 [facility] Information is for End User's use only and may not be sold, redistributed or otherwise used for commercial purposes  All illustrations and images included in CareNotes® are the copyrighted property of A D A M , Inc  or Adriano Spears  The above information is an  only  It is not intended as medical advice for individual conditions or treatments  Talk to your doctor, nurse or pharmacist before following any medical regimen to see if it is safe and effective for you

## 2019-11-26 NOTE — CONSULTS
The patients Vancomycin therapy has been discontinued  Thank you for this consult    Pharmacy will sign-off now

## 2019-11-26 NOTE — DISCHARGE INSTR - AVS FIRST PAGE
You will need to take oral antibiotics for 7 days after discharge from [address] [place] [address]  The antibiotics of choice are Keflex and Flagyl  Home health will be arranged for daily wound packing changes and dressing changes until you are seen at [facility] will be contacting you to schedule a surgical visit with them in 1-2 weeks  If they do not contact you by Friday 11/29 you should try to contact them or your surgeon at [address] to arrange this visit  You may shower at home  Do not soak in a bath tub, hot tub or swimming pool  You may return to work without restriction on Monday December 2nd

## 2019-11-26 NOTE — ASSESSMENT & PLAN NOTE
· History of SVT, status post ablation 5 years ago at [facility]  · Maintained on Toprol XL  · Remained in sinus rhythm in the hospital

## 2019-11-27 LAB
BACTERIA BLD CULT: NORMAL
BACTERIA BLD CULT: NORMAL

## 2019-11-27 NOTE — UTILIZATION REVIEW
Notification of Discharge  This is a Notification of Discharge from our facility [address]  Please be advised that this patient has been discharge from our facility  Below you will find the admission and discharge date and time including the patients disposition  PRESENTATION DATE: 11/21/2019 11:23 PM  OBS ADMISSION DATE:   IP ADMISSION DATE: 11/22/19 0139   DISCHARGE DATE: 11/26/2019  3:50 PM  DIS[facility]ITION[facility]   Admission Orders listed below:  Admission Orders (From admission, onward)     Ordered        11/22/19 0139  Inpatient Admission  Once                   Please contact the UR Department if additional information is required to close this patient's authorization/case  Tiffanie Blankenship  [facility] Utilization Review Department  Main: [phone] x carefully listen to the prompts  All voicemails are confidential   [email or web address]  Send all requests for admission clinical reviews, approved or denied determinations and any other requests to dedicated fax number below belonging to the campus where the patient is receiving treatment    List of dedicated fax numbers:  [address] DENIALS (Administrative/Medical Necessity) [phone]   [address] (Maternity/NICU/Pediatrics) [phone]   Valentina Perea [phone]   Alyx Miranda [phone]   [facility] Elicia [phone]   145 [facility] 1525 [facility] [phone]   Mylinda Reveal [address] [phone]

## 2019-11-29 NOTE — UTILIZATION REVIEW
Continued Stay Review    Date: 11/26/19                         Current Patient Class: Inpatient  Current Level of Care: MEd/surg    HPI:59 y o  female initially admitted on 11/22/19  Assessment/Plan:   UPdate 11/24:  bubble like area at the mid abdomen with erythema noted and fluctuance noted with surrounding cellulitis noted  Surgery did bedside I&D with deep would culture obtained  Minimal serous drainage was noted and minimal purulent drainage was noted  Continue with IV antibiotics and local wound care  Erythema from the lower part is improving but shifted more to worse that right now  UPdate 11/25: Final wound culture showed 1+ Klebsiella pneumonia and 1+ gram-negative rods  IV Flagyl added  Continue with IV ANcef  The abdominal ABD dressing was removed  There is quarter-inch iodoform packing placed in the incision and dry at present  Patient believes that there is improvement in the surrounding erythema around the periumbilical area  Minimal tenderness to area       Pertinent Labs/Diagnostic Results:   Results from last 7 days   Lab Units 11/26/19  0950 11/25/19  0558 11/24/19  0522   WBC Thousand/uL 9 19 10 57* 9 71   HEMOGLOBIN g/dL 10 5* 10 5* 9 7*   HEMATOCRIT % 33 1* 33 2* 30 3*   PLATELETS Thousands/uL 375 379 356   NEUTROS ABS Thousands/µL 6 56  --   --          Results from last 7 days   Lab Units 11/26/19  0950 11/25/19  0608 11/24/19  0520   SODIUM mmol/L 136 135* 137   POTASSIUM mmol/L 4 0 3 9 3 9   CHLORIDE mmol/L 99* 100 102   CO2 mmol/L 28 27 25   ANION GAP mmol/L 9 8 10   BUN mg/dL 12 13 12   CREATININE mg/dL 0 77 0 59* 0 57*   EGFR ml/min/1 73sq m 85 101 102   CALCIUM mg/dL 8 7 9 0 8 9         Results from last 7 days   Lab Units 11/26/19  1131 11/26/19  0743 11/25/19  2121 11/25/19  1607 11/25/19  1124 11/25/19  0725 11/24/19  2151 11/24/19  1605 11/24/19  1106 11/24/19  0737   POC GLUCOSE mg/dl 165* 131 151* 158* 171* 166* 131 144* 178* 138     Results from last 7 days   Lab Units 11/26/19  0950 11/25/19  0608 11/24/19  0520   GLUCOSE RANDOM mg/dL 238* 127 128     Results from last 7 days   Lab Units 11/23/19  0622   PROTIME seconds 12 9   INR  0 97   PTT seconds 28       Results from last 7 days   Lab Units 11/24/19  0918   GRAM STAIN RESULT  2+ Polys*  Rare Gram negative rods*   WOUND CULTURE  Few Colonies of Klebsiella pneumoniae*       Vital Signs:   Date/Time  Temp  Pulse  Resp  BP  MAP (mmHg)  SpO2  O2 Device  Patient Position - Orthostatic VS   11/26/19 00:14:09  98 1 °F (36 7 °C)  84  18  125/52  76  95 %  --  --   11/25/19 1600  --  --  --  --  --  --  None (Room air)  --   11/25/19 15:57:50  97 9 °F (36 6 °C)  89  20  139/67  91  97 %  --  --   11/25/19 07:25:45  98 1 °F (36 7 °C)  79  18  125/61  82  94 %  --  --   11/25/19 00:08:27  98 2 °F (36 8 °C)  86  16  124/62  83  93 %  --  --   11/24/19 2000  --  --  --  --  --  98 %  None (Room air)  --   11/24/19 14:47:03  98 2 °F (36 8 °C)  85  16  122/75  91  95 %  None (Room air)  --   11/24/19 07:42:15  97 9 °F (36 6 °C)  81  16  122/69               Medications:   Scheduled Medications:  INsulinsq  Ancef iv every 8 hours  Heparin sq  Hydralazine prn  toprol  Paxil  Continuous IV saline  zofran prn  Vancomycin iv  Flagyl iv  Tylenol prn    Discharge Plan: TBD  Network Utilization Review Department  [email or web address]  ATTENTION: Please call with any questions or concerns to [phone] and carefully listen to the prompts so that you are directed to the right person  All voicemails are confidential   Chrystine Can all requests for admission clinical reviews, approved or denied determinations and any other requests to dedicated fax number below belonging to the campus where the patient is receiving treatment  FACILITY NAME UR FAX NUMBER   ADMISSION DENIALS (Administrative/Medical Necessity) [address] [facility] (Maternity/NICU/Pediatrics) [place] [phone]     Kailey Willard [phone]   Martha Balderrama [phone]   Brandt Veto [facility] [phone]   [place] [facility] [address] [phone]

## 2019-12-02 NOTE — UTILIZATION REVIEW
Notification of Discharge  This is a Notification of Discharge from our facility [address]  Please be advised that this patient has been discharge from our facility  Below you will find the admission and discharge date and time including the patients disposition  PRESENTATION DATE: 11/21/2019 11:23 PM  OBS ADMISSION DATE:   IP ADMISSION DATE: 11/22/19 0139   DISCHARGE DATE: 11/26/2019  3:50 PM  DISPOSITION: Home with [facility] with [facility]   Admission Orders listed below:  Admission Orders (From admission, onward)     Ordered        11/22/19 0139  Inpatient Admission  Once                   Please contact the UR Department if additional information is required to close this patient's authorization/case  [address] Utilization Review Department  Main: [phone] x carefully listen to the prompts  All voicemails are confidential   [email or web address]  Send all requests for admission clinical reviews, approved or denied determinations and any other requests to dedicated fax number below belonging to the campus where the patient is receiving treatment   List of dedicated fax numbers:  [address] DENIALS (Administrative/Medical Necessity) [phone]   [address] (Maternity/NICU/Pediatrics) [phone]     Sargario Halo [phone]   Abbie Milton [phone]   Martha Balderrama [phone]   Brandt Laws [facility] [phone]   Derian Rodriguez [phone]   Taylor Shoemaker [phone]   [place] [facility] [address] [phone]

## 2019-12-03 ENCOUNTER — OFFICE VISIT (OUTPATIENT)
Dept: URGENT CARE | Facility: CLINIC | Age: 59
End: 2019-12-03
Payer: COMMERCIAL

## 2019-12-03 ENCOUNTER — APPOINTMENT (EMERGENCY)
Dept: CT IMAGING | Facility: HOSPITAL | Age: 59
DRG: 603 | End: 2019-12-03
Payer: COMMERCIAL

## 2019-12-03 ENCOUNTER — HOSPITAL ENCOUNTER (INPATIENT)
Facility: HOSPITAL | Age: 59
LOS: 2 days | Discharge: HOME | DRG: 603 | End: 2019-12-05
Attending: EMERGENCY MEDICINE | Admitting: INTERNAL MEDICINE
Payer: COMMERCIAL

## 2019-12-03 VITALS
BODY MASS INDEX: 47.09 KG/M2 | HEART RATE: 86 BPM | TEMPERATURE: 98 F | SYSTOLIC BLOOD PRESSURE: 135 MMHG | DIASTOLIC BLOOD PRESSURE: 67 MMHG | RESPIRATION RATE: 16 BRPM | HEIGHT: 66 IN | OXYGEN SATURATION: 99 % | WEIGHT: 293 LBS

## 2019-12-03 DIAGNOSIS — R79.89 ELEVATED LACTIC ACID LEVEL: ICD-10-CM

## 2019-12-03 DIAGNOSIS — K43.9 VENTRAL HERNIA WITHOUT OBSTRUCTION OR GANGRENE: ICD-10-CM

## 2019-12-03 DIAGNOSIS — R79.82 ELEVATED C-REACTIVE PROTEIN (CRP): ICD-10-CM

## 2019-12-03 DIAGNOSIS — R11.2 NON-INTRACTABLE VOMITING WITH NAUSEA, UNSPECIFIED VOMITING TYPE: ICD-10-CM

## 2019-12-03 DIAGNOSIS — D64.9 ANEMIA: ICD-10-CM

## 2019-12-03 DIAGNOSIS — R11.10 VOMITING, INTRACTABILITY OF VOMITING NOT SPECIFIED, PRESENCE OF NAUSEA NOT SPECIFIED, UNSPECIFIED VOMITING TYPE: Primary | ICD-10-CM

## 2019-12-03 DIAGNOSIS — L02.211 ABDOMINAL WALL ABSCESS: Primary | ICD-10-CM

## 2019-12-03 DIAGNOSIS — R70.0 ELEVATED ERYTHROCYTE SEDIMENTATION RATE: ICD-10-CM

## 2019-12-03 DIAGNOSIS — D72.829 LEUKOCYTOSIS, UNSPECIFIED TYPE: ICD-10-CM

## 2019-12-03 DIAGNOSIS — N30.90 CYSTITIS: ICD-10-CM

## 2019-12-03 PROBLEM — R82.71 BACTERIURIA: Status: ACTIVE | Noted: 2019-12-03

## 2019-12-03 LAB
ALBUMIN SERPL BCP-MCNC: 3.6 G/DL (ref 3.5–5)
ALP SERPL-CCNC: 90 U/L (ref 46–116)
ALT SERPL W P-5'-P-CCNC: 18 U/L (ref 12–78)
ANION GAP SERPL CALCULATED.3IONS-SCNC: 13 MMOL/L (ref 4–13)
AST SERPL W P-5'-P-CCNC: 14 U/L (ref 5–45)
BACTERIA UR QL AUTO: ABNORMAL /HPF
BASOPHILS # BLD AUTO: 0.05 THOUSANDS/ΜL (ref 0–0.1)
BASOPHILS NFR BLD AUTO: 0 % (ref 0–1)
BILIRUB SERPL-MCNC: 0.33 MG/DL (ref 0.2–1)
BILIRUB UR QL STRIP: ABNORMAL
BUN SERPL-MCNC: 24 MG/DL (ref 5–25)
CALCIUM SERPL-MCNC: 9.2 MG/DL (ref 8.3–10.1)
CHLORIDE SERPL-SCNC: 99 MMOL/L (ref 100–108)
CLARITY UR: ABNORMAL
CO2 SERPL-SCNC: 25 MMOL/L (ref 21–32)
COLOR UR: ABNORMAL
CREAT SERPL-MCNC: 0.97 MG/DL (ref 0.6–1.3)
CRP SERPL QL: 34.5 MG/L
EOSINOPHIL # BLD AUTO: 0.02 THOUSAND/ΜL (ref 0–0.61)
EOSINOPHIL NFR BLD AUTO: 0 % (ref 0–6)
ERYTHROCYTE [DISTWIDTH] IN BLOOD BY AUTOMATED COUNT: 14 % (ref 11.6–15.1)
ERYTHROCYTE [SEDIMENTATION RATE] IN BLOOD: 58 MM/HOUR (ref 0–20)
GFR SERPL CREATININE-BSD FRML MDRD: 64 ML/MIN/1.73SQ M
GLUCOSE SERPL-MCNC: 161 MG/DL (ref 65–140)
GLUCOSE UR STRIP-MCNC: NEGATIVE MG/DL
HCT VFR BLD AUTO: 37.1 % (ref 34.8–46.1)
HGB BLD-MCNC: 11.6 G/DL (ref 11.5–15.4)
HGB UR QL STRIP.AUTO: ABNORMAL
IMM GRANULOCYTES # BLD AUTO: 0.08 THOUSAND/UL (ref 0–0.2)
IMM GRANULOCYTES NFR BLD AUTO: 1 % (ref 0–2)
KETONES UR STRIP-MCNC: NEGATIVE MG/DL
LACTATE SERPL-SCNC: 2.9 MMOL/L (ref 0.5–2)
LEUKOCYTE ESTERASE UR QL STRIP: ABNORMAL
LIPASE SERPL-CCNC: 170 U/L (ref 73–393)
LYMPHOCYTES # BLD AUTO: 2.46 THOUSANDS/ΜL (ref 0.6–4.47)
LYMPHOCYTES NFR BLD AUTO: 16 % (ref 14–44)
MCH RBC QN AUTO: 25.6 PG (ref 26.8–34.3)
MCHC RBC AUTO-ENTMCNC: 31.3 G/DL (ref 31.4–37.4)
MCV RBC AUTO: 82 FL (ref 82–98)
MONOCYTES # BLD AUTO: 0.85 THOUSAND/ΜL (ref 0.17–1.22)
MONOCYTES NFR BLD AUTO: 5 % (ref 4–12)
MUCOUS THREADS UR QL AUTO: ABNORMAL
NEUTROPHILS # BLD AUTO: 12.22 THOUSANDS/ΜL (ref 1.85–7.62)
NEUTS SEG NFR BLD AUTO: 78 % (ref 43–75)
NITRITE UR QL STRIP: NEGATIVE
NON-SQ EPI CELLS URNS QL MICRO: ABNORMAL /HPF
NRBC BLD AUTO-RTO: 0 /100 WBCS
OTHER STN SPEC: ABNORMAL
PH UR STRIP.AUTO: 5.5 [PH]
PLATELET # BLD AUTO: 466 THOUSANDS/UL (ref 149–390)
PMV BLD AUTO: 8.8 FL (ref 8.9–12.7)
POTASSIUM SERPL-SCNC: 4.5 MMOL/L (ref 3.5–5.3)
PROT SERPL-MCNC: 8.1 G/DL (ref 6.4–8.2)
PROT UR STRIP-MCNC: ABNORMAL MG/DL
RBC # BLD AUTO: 4.54 MILLION/UL (ref 3.81–5.12)
RBC #/AREA URNS AUTO: ABNORMAL /HPF
SODIUM SERPL-SCNC: 137 MMOL/L (ref 136–145)
SP GR UR STRIP.AUTO: >=1.03 (ref 1–1.03)
UROBILINOGEN UR QL STRIP.AUTO: 0.2 E.U./DL
WBC # BLD AUTO: 15.68 THOUSAND/UL (ref 4.31–10.16)
WBC #/AREA URNS AUTO: ABNORMAL /HPF

## 2019-12-03 PROCEDURE — 87106 FUNGI IDENTIFICATION YEAST: CPT | Performed by: PHYSICIAN ASSISTANT

## 2019-12-03 PROCEDURE — 83690 ASSAY OF LIPASE: CPT | Performed by: PHYSICIAN ASSISTANT

## 2019-12-03 PROCEDURE — 80053 COMPREHEN METABOLIC PANEL: CPT | Performed by: PHYSICIAN ASSISTANT

## 2019-12-03 PROCEDURE — 85025 COMPLETE CBC W/AUTO DIFF WBC: CPT | Performed by: PHYSICIAN ASSISTANT

## 2019-12-03 PROCEDURE — 96361 HYDRATE IV INFUSION ADD-ON: CPT

## 2019-12-03 PROCEDURE — 96366 THER/PROPH/DIAG IV INF ADDON: CPT

## 2019-12-03 PROCEDURE — 96375 TX/PRO/DX INJ NEW DRUG ADDON: CPT

## 2019-12-03 PROCEDURE — 83605 ASSAY OF LACTIC ACID: CPT | Performed by: PHYSICIAN ASSISTANT

## 2019-12-03 PROCEDURE — 99285 EMERGENCY DEPT VISIT HI MDM: CPT

## 2019-12-03 PROCEDURE — 99284 EMERGENCY DEPT VISIT MOD MDM: CPT | Performed by: PHYSICIAN ASSISTANT

## 2019-12-03 PROCEDURE — 96365 THER/PROPH/DIAG IV INF INIT: CPT

## 2019-12-03 PROCEDURE — 87086 URINE CULTURE/COLONY COUNT: CPT | Performed by: PHYSICIAN ASSISTANT

## 2019-12-03 PROCEDURE — 36415 COLL VENOUS BLD VENIPUNCTURE: CPT | Performed by: PHYSICIAN ASSISTANT

## 2019-12-03 PROCEDURE — 85652 RBC SED RATE AUTOMATED: CPT | Performed by: PHYSICIAN ASSISTANT

## 2019-12-03 PROCEDURE — 83605 ASSAY OF LACTIC ACID: CPT | Performed by: NURSE PRACTITIONER

## 2019-12-03 PROCEDURE — 86140 C-REACTIVE PROTEIN: CPT | Performed by: PHYSICIAN ASSISTANT

## 2019-12-03 PROCEDURE — 99213 OFFICE O/P EST LOW 20 MIN: CPT | Performed by: PHYSICIAN ASSISTANT

## 2019-12-03 PROCEDURE — 81001 URINALYSIS AUTO W/SCOPE: CPT | Performed by: PHYSICIAN ASSISTANT

## 2019-12-03 PROCEDURE — 74177 CT ABD & PELVIS W/CONTRAST: CPT

## 2019-12-03 RX ORDER — ONDANSETRON 2 MG/ML
4 INJECTION INTRAMUSCULAR; INTRAVENOUS ONCE
Status: COMPLETED | OUTPATIENT
Start: 2019-12-03 | End: 2019-12-03

## 2019-12-03 RX ORDER — SODIUM CHLORIDE 9 MG/ML
75 INJECTION, SOLUTION INTRAVENOUS CONTINUOUS
Status: DISPENSED | OUTPATIENT
Start: 2019-12-03 | End: 2019-12-04

## 2019-12-03 RX ORDER — PAROXETINE HYDROCHLORIDE 20 MG/1
10 TABLET, FILM COATED ORAL DAILY
Status: DISCONTINUED | OUTPATIENT
Start: 2019-12-04 | End: 2019-12-05 | Stop reason: HOSPADM

## 2019-12-03 RX ORDER — PRAVASTATIN SODIUM 40 MG
40 TABLET ORAL
Status: DISCONTINUED | OUTPATIENT
Start: 2019-12-04 | End: 2019-12-05 | Stop reason: HOSPADM

## 2019-12-03 RX ORDER — NYSTATIN 100000 [USP'U]/G
POWDER TOPICAL 2 TIMES DAILY
Status: DISCONTINUED | OUTPATIENT
Start: 2019-12-04 | End: 2019-12-05 | Stop reason: HOSPADM

## 2019-12-03 RX ORDER — ASPIRIN 81 MG/1
81 TABLET ORAL DAILY
Status: DISCONTINUED | OUTPATIENT
Start: 2019-12-04 | End: 2019-12-05 | Stop reason: HOSPADM

## 2019-12-03 RX ORDER — METOPROLOL SUCCINATE 100 MG/1
100 TABLET, EXTENDED RELEASE ORAL DAILY
Status: DISCONTINUED | OUTPATIENT
Start: 2019-12-04 | End: 2019-12-05 | Stop reason: HOSPADM

## 2019-12-03 RX ORDER — ONDANSETRON 2 MG/ML
4 INJECTION INTRAMUSCULAR; INTRAVENOUS EVERY 6 HOURS PRN
Status: DISCONTINUED | OUTPATIENT
Start: 2019-12-03 | End: 2019-12-05 | Stop reason: HOSPADM

## 2019-12-03 RX ADMIN — IOHEXOL 50 ML: 240 INJECTION, SOLUTION INTRATHECAL; INTRAVASCULAR; INTRAVENOUS; ORAL at 17:20

## 2019-12-03 RX ADMIN — ONDANSETRON 4 MG: 2 INJECTION INTRAMUSCULAR; INTRAVENOUS at 14:09

## 2019-12-03 RX ADMIN — IOHEXOL 100 ML: 350 INJECTION, SOLUTION INTRAVENOUS at 17:20

## 2019-12-03 RX ADMIN — SODIUM CHLORIDE 1000 ML: 0.9 INJECTION, SOLUTION INTRAVENOUS at 14:09

## 2019-12-03 RX ADMIN — PIPERACILLIN SODIUM,TAZOBACTAM SODIUM 3.38 G: 3; .375 INJECTION, POWDER, FOR SOLUTION INTRAVENOUS at 17:40

## 2019-12-03 RX ADMIN — SODIUM CHLORIDE 1000 ML: 0.9 INJECTION, SOLUTION INTRAVENOUS at 20:04

## 2019-12-03 NOTE — ED PROVIDER NOTES
History  Chief Complaint   Patient presents with    Vomiting     Pt with recent admission for same, had been feeling better, now with nausea and vomiting that began yesterday  70-year-old female with a history of hypertension, hyperlipidemia, diabetes presents to the emergency department for evaluation of nausea and vomiting  The patient reports that for the past 2 days, she has been experiencing constant nausea and has had 3 episodes of emesis yesterday, and 2 episodes of emesis today  She denies any abdominal pain, and she states that she has intermittently been able to keep down fluids and food, however due to her medical history, she came to the emergency department for further evaluation  The patient explains a hernia repair surgery back in June  She was experiencing recurrent dehiscence of her wound, and about 2 weeks ago, came to the emergency department for evaluation of worsening pain around this area  She was noted to have a large ventral wall hernia with large amounts of small and large bowel, as well as a subcutaneous abscess found on CT  She was subsequently noted while in the hospital to have a questionable fistula  The abscess was drained in the hospital, and the patient is following with [facility] to have the hernias and fistulas addressed  She has her appointment in 3 days, however due to the onset of her nausea and vomiting for the past 2 days, she came to the ER for further evaluation  She denies any significant abdominal pain, and states that she has been moving her bowels and urinating without issues  She also denies any fevers, chills, night sweats, chest pain, shortness of breath, recent cough or cold symptoms, skin changes or rashes, or any other acute complaints at this time  Prior to Admission Medications   Prescriptions Last Dose Informant Patient Reported? Taking?    Exenatide ER (BYDUREON) 2 MG PEN   Yes Yes   Sig: Inject 2 mg under the skin once a week   PARoxetine (PAXIL) 10 mg tablet   Yes Yes   Sig: Take 10 mg by mouth daily   aspirin (ECOTRIN LOW STRENGTH) 81 mg EC tablet   Yes Yes   Sig: Take 81 mg by mouth daily   cephalexin (KEFLEX) 500 mg capsule   No Yes   Sig: Take 1 capsule (500 mg total) by mouth every 8 (eight) hours for 7 days   metFORMIN (GLUCOPHAGE) 500 mg tablet   Yes Yes   Sig: Take 500 mg by mouth daily   metoprolol succinate (TOPROL-XL) 100 mg 24 hr tablet   Yes Yes   Sig: Take 100 mg by mouth daily   metroNIDAZOLE (FLAGYL) 500 mg tablet   No Yes   Sig: Take 1 tablet (500 mg total) by mouth every 8 (eight) hours for 7 days   simvastatin (ZOCOR) 20 mg tablet   Yes Yes   Sig: Take 20 mg by mouth daily at bedtime      Facility-Administered Medications: None       Past Medical History:   Diagnosis Date    Depression     Diabetes mellitus ([facility] [address] )     Fistula     High cholesterol     Hypertension        Past Surgical History:   Procedure Laterality Date    BOWEL RESECTION      CHOLECYSTECTOMY LAPAROSCOPIC      HERNIA REPAIR         Family History   Problem Relation Age of Onset    Heart disease Mother      I have reviewed and agree with the history as documented  Social History     Tobacco Use    Smoking status: Never Smoker    Smokeless tobacco: Never Used   Substance Use Topics    Alcohol use: Yes     Comment: rarely    Drug use: Never        Review of Systems   Constitutional: Negative for chills and fever  HENT: Negative for congestion and rhinorrhea  Respiratory: Negative for chest tightness and shortness of breath  Cardiovascular: Negative for chest pain and palpitations  Gastrointestinal: Positive for nausea and vomiting  Negative for abdominal pain  Genitourinary: Negative for difficulty urinating and dysuria  Musculoskeletal: Negative for back pain and gait problem  Skin: Negative for rash and wound  Neurological: Negative for light-headedness and headaches         Physical Exam  Physical Exam Constitutional: She appears well-developed and well-nourished  She appears distressed (Mild distress)  HENT:   Head: Normocephalic and atraumatic  Mouth/Throat: No oropharyngeal exudate  Eyes: Pupils are equal, round, and reactive to light  EOM are normal    Neck: Normal range of motion  Neck supple  Cardiovascular: Normal rate and regular rhythm  Pulmonary/Chest: Effort normal    Abdominal: Soft  There is no tenderness  There is no guarding  Wound to right mid abdomen, bandage in place, there is no significant erythema, no induration, no tenderness   Musculoskeletal: Normal range of motion  She exhibits no deformity  Neurological: She is alert  No cranial nerve deficit  Skin: Skin is warm and dry  No erythema         Vital Signs  ED Triage Vitals   Temperature Pulse Respirations Blood Pressure SpO2   12/03/19 1249 12/03/19 1249 12/03/19 1249 12/03/19 1249 12/03/19 1249   98 5 °F (36 9 °C) 86 16 150/82 97 %      Temp Source Heart Rate Source Patient Position - Orthostatic VS BP Location FiO2 (%)   12/03/19 1249 12/03/19 1527 12/03/19 1249 12/03/19 1249 --   Oral Monitor Sitting Right arm       Pain Score       12/03/19 1249       4           Vitals:    12/03/19 2300 12/04/19 0040 12/04/19 0104 12/04/19 0612   BP: 129/59 137/65 136/64 112/64   Pulse: 84 83 83 94   Patient Position - Orthostatic VS:   Lying Lying         Visual Acuity      ED Medications  Medications   metoprolol succinate (TOPROL-XL) 24 hr tablet 100 mg (has no administration in time range)   aspirin (ECOTRIN LOW STRENGTH) EC tablet 81 mg (has no administration in time range)   PARoxetine (PAXIL) tablet 10 mg (has no administration in time range)   pravastatin (PRAVACHOL) tablet 40 mg (has no administration in time range)   sodium chloride 0 9 % infusion (75 mL/hr Intravenous New Bag 12/4/19 0033)   ondansetron (ZOFRAN) injection 4 mg (has no administration in time range)   enoxaparin (LOVENOX) subcutaneous injection 40 mg (has no administration in time range)   nystatin (MYCOSTATIN) powder (has no administration in time range)   cefepime (MAXIPIME) 2,000 mg in dextrose 5 % 50 mL IVPB (0 mg Intravenous Stopped 12/4/19 0218)   metroNIDAZOLE (FLAGYL) IVPB (premix) 500 mg (500 mg Intravenous New Bag 12/4/19 0218)   insulin lispro (HumaLOG) 100 units/mL subcutaneous injection 2-12 Units (2 Units Subcutaneous Not Given 12/4/19 0738)   insulin lispro (HumaLOG) 100 units/mL subcutaneous injection 2-12 Units (2 Units Subcutaneous Given 12/4/19 0215)   sodium chloride 0 9 % bolus 1,000 mL (0 mL Intravenous Stopped 12/3/19 2002)   ondansetron (ZOFRAN) injection 4 mg (4 mg Intravenous Given 12/3/19 1409)   iohexol (OMNIPAQUE) 240 MG/ML solution 50 mL (50 mL Oral Given 12/3/19 1720)   piperacillin-tazobactam (ZOSYN) 3 375 g in sodium chloride 0 9 % 50 mL IVPB (0 g Intravenous Stopped 12/3/19 2004)   sodium chloride 0 9 % bolus 1,000 mL (0 mL Intravenous Stopped 12/3/19 2204)   iohexol (OMNIPAQUE) 350 MG/ML injection (SINGLE-DOSE) 100 mL (100 mL Intravenous Given 12/3/19 1720)   sodium chloride 0 9 % bolus 1,000 mL (1,000 mL Intravenous New Bag 12/4/19 0034)       Diagnostic Studies  Results Reviewed     Procedure Component Value Units Date/Time    Fingerstick Glucose (POCT) [[id number]]  (Abnormal) Collected:  12/04/19 0733    Lab Status:  Final result Updated:  12/04/19 0735     POC Glucose 148 mg/dl     Basic metabolic panel [[id number]]  (Abnormal) Collected:  12/04/19 0517    Lab Status:  Final result Specimen:  Blood from Hand, Right Updated:  12/04/19 5889     Sodium 138 mmol/L      Potassium 3 6 mmol/L      Chloride 103 mmol/L      CO2 24 mmol/L      ANION GAP 11 mmol/L      BUN 15 mg/dL      Creatinine 0 68 mg/dL      Glucose 134 mg/dL      Calcium 8 1 mg/dL      eGFR 96 ml/min/1 73sq m     Narrative:       [place] guidelines for Chronic Kidney Disease (CKD):     Stage 1 with normal or high GFR (GFR > 90 mL/min/1 73 square meters)    Stage 2 Mild CKD (GFR = 60-89 mL/min/1 73 square meters)    Stage 3A Moderate CKD (GFR = 45-59 mL/min/1 73 square meters)    Stage 3B Moderate CKD (GFR = 30-44 mL/min/1 73 square meters)    Stage 4 Severe CKD (GFR = 15-29 mL/min/1 73 square meters)    Stage 5 End Stage CKD (GFR <15 mL/min/1 73 square meters)  Note: GFR calculation is accurate only with a steady state creatinine    CBC and differential [[id number]]  (Abnormal) Collected:  12/04/19 0517    Lab Status:  Final result Specimen:  Blood from Hand, Right Updated:  12/04/19 0554     WBC 12 00 Thousand/uL      RBC 3 77 Million/uL      Hemoglobin 9 9 g/dL      Hematocrit 30 9 %      MCV 82 fL      MCH 26 3 pg      MCHC 32 0 g/dL      RDW 14 1 %      MPV 8 9 fL      Platelets 133 Thousands/uL      nRBC 0 /100 WBCs      Neutrophils Relative 71 %      Immat GRANS % 0 %      Lymphocytes Relative 20 %      Monocytes Relative 8 %      Eosinophils Relative 1 %      Basophils Relative 0 %      Neutrophils Absolute 8 46 Thousands/µL      Immature Grans Absolute 0 05 Thousand/uL      Lymphocytes Absolute 2 43 Thousands/µL      Monocytes Absolute 0 95 Thousand/µL      Eosinophils Absolute 0 08 Thousand/µL      Basophils Absolute 0 03 Thousands/µL     Lactic acid, plasma [[id number]]  (Normal) Collected:  12/04/19 0305    Lab Status:  Final result Specimen:  Blood from Arm, Right Updated:  12/04/19 0341     LACTIC ACID 2 0 mmol/L     Narrative:       Result may be elevated if tourniquet was used during collection  Fingerstick Glucose (POCT) [[id number]]  (Abnormal) Collected:  12/04/19 0122    Lab Status:  Final result Updated:  12/04/19 0123     POC Glucose 162 mg/dl     Lactic acid, plasma [[id number]]  (Abnormal) Collected:  12/03/19 2331    Lab Status:  Final result Specimen:  Blood from Hand, Right Updated:  12/04/19 0006     LACTIC ACID 2 8 mmol/L     Narrative:       Result may be elevated if tourniquet was used during collection    Result may be elevated if tourniquet was used during collection  Sedimentation rate, automated [[id number]]  (Abnormal) Collected:  12/03/19 1403    Lab Status:  Final result Specimen:  Blood from Arm, Left Updated:  12/03/19 1514     Sed Rate 58 mm/hour     C-reactive protein [[id number]]  (Abnormal) Collected:  12/03/19 1404    Lab Status:  Final result Specimen:  Blood from Line, Venous Updated:  12/03/19 1444     CRP 34 5 mg/L     Lactic acid, plasma [[id number]]  (Abnormal) Collected:  12/03/19 1403    Lab Status:  Final result Specimen:  Blood from Line, Venous Updated:  12/03/19 1444     LACTIC ACID 2 9 mmol/L     Narrative:       Result may be elevated if tourniquet was used during collection      Comprehensive metabolic panel [[id number]]  (Abnormal) Collected:  12/03/19 1404    Lab Status:  Final result Specimen:  Blood from Line, Venous Updated:  12/03/19 1443     Sodium 137 mmol/L      Potassium 4 5 mmol/L      Chloride 99 mmol/L      CO2 25 mmol/L      ANION GAP 13 mmol/L      BUN 24 mg/dL      Creatinine 0 97 mg/dL      Glucose 161 mg/dL      Calcium 9 2 mg/dL      AST 14 U/L      ALT 18 U/L      Alkaline Phosphatase 90 U/L      Total Protein 8 1 g/dL      Albumin 3 6 g/dL      Total Bilirubin 0 33 mg/dL      eGFR 64 ml/min/1 73sq m     Narrative:       Main guidelines for Chronic Kidney Disease (CKD):     Stage 1 with normal or high GFR (GFR > 90 mL/min/1 73 square meters)    Stage 2 Mild CKD (GFR = 60-89 mL/min/1 73 square meters)    Stage 3A Moderate CKD (GFR = 45-59 mL/min/1 73 square meters)    Stage 3B Moderate CKD (GFR = 30-44 mL/min/1 73 square meters)    Stage 4 Severe CKD (GFR = 15-29 mL/min/1 73 square meters)    Stage 5 End Stage CKD (GFR <15 mL/min/1 73 square meters)  Note: GFR calculation is accurate only with a steady state creatinine    Lipase [[id number]]  (Normal) Collected:  12/03/19 1404    Lab Status:  Final result Specimen:  Blood from Line, Venous Updated: 12/03/19 1443     Lipase 170 u/L     Urine Microscopic [[id number]]  (Abnormal) Collected:  12/03/19 1406    Lab Status:  Final result Specimen:  Urine, Clean Catch Updated:  12/03/19 1432     RBC, UA 4-10 /hpf      WBC, UA 10-20 /hpf      Epithelial Cells Moderate /hpf      Bacteria, UA Moderate /hpf      OTHER OBSERVATIONS Yeast Cells Present     MUCUS THREADS Occasional    Urine culture [[id number]] Collected:  12/03/19 1406    Lab Status:   In process Specimen:  Urine, Clean Catch Updated:  12/03/19 1432    UA w Reflex to Microscopic w Reflex to Culture [[id number]]  (Abnormal) Collected:  12/03/19 1406    Lab Status:  Final result Specimen:  Urine, Clean Catch Updated:  12/03/19 1416     Color, UA Uzma     Clarity, UA Slightly Cloudy     Specific Gravity, UA >=1 030     pH, UA 5 5     Leukocytes, UA Small     Nitrite, UA Negative     Protein, UA Trace mg/dl      Glucose, UA Negative mg/dl      Ketones, UA Negative mg/dl      Urobilinogen, UA 0 2 E U /dl      Bilirubin, UA Small     Blood, UA Small    CBC and differential [[id number]]  (Abnormal) Collected:  12/03/19 1403    Lab Status:  Final result Specimen:  Blood from Line, Venous Updated:  12/03/19 1412     WBC 15 68 Thousand/uL      RBC 4 54 Million/uL      Hemoglobin 11 6 g/dL      Hematocrit 37 1 %      MCV 82 fL      MCH 25 6 pg      MCHC 31 3 g/dL      RDW 14 0 %      MPV 8 8 fL      Platelets 428 Thousands/uL      nRBC 0 /100 WBCs      Neutrophils Relative 78 %      Immat GRANS % 1 %      Lymphocytes Relative 16 %      Monocytes Relative 5 %      Eosinophils Relative 0 %      Basophils Relative 0 %      Neutrophils Absolute 12 22 Thousands/µL      Immature Grans Absolute 0 08 Thousand/uL      Lymphocytes Absolute 2 46 Thousands/µL      Monocytes Absolute 0 85 Thousand/µL      Eosinophils Absolute 0 02 Thousand/µL      Basophils Absolute 0 05 Thousands/µL                  CT abdomen pelvis with contrast   Final Result by Saba Rucker MD (12/03 1749)         Persistent subcutaneous abscess with apparent cutaneous fistula within the complex ventral abdominal hernia  No significant change in size of the abscess  Persistent concern for developing enterocutaneous fistula  No evidence of bowel obstruction              Workstation performed: [id number]                    Procedures  Procedures         ED Course           MDM  Number of Diagnoses or Management Options  Abdominal wall abscess: established and worsening  Non-intractable vomiting with nausea, unspecified vomiting type: new and does not require workup     Amount and/or Complexity of Data Reviewed  Clinical lab tests: ordered and reviewed  Tests in the radiology section of CPT®: ordered and reviewed    Risk of Complications, Morbidity, and/or Mortality  Presenting problems: moderate  Diagnostic procedures: low  Management options: moderate    Patient Progress  Patient progress: improved        Disposition  Final diagnoses:   Abdominal wall abscess   Non-intractable vomiting with nausea, unspecified vomiting type   Leukocytosis, unspecified type   Anemia   Elevated lactic acid level   Elevated C-reactive protein (CRP)   Elevated erythrocyte sedimentation rate   Cystitis   Ventral hernia without obstruction or gangrene     Time reflects when diagnosis was documented in both MDM as applicable and the Disposition within this note     Time User Action Codes Description Comment    12/3/2019  6:44 PM Mandie Hilliard Add [P32 905] Abdominal wall abscess     12/3/2019  6:44 PM Henrry Beavers Add [R11 2] Non-intractable vomiting with nausea, unspecified vomiting type     12/4/2019  7:49 AM Buelah Irons Add [D72 829] Leukocytosis, unspecified type     12/4/2019  7:49 AM Buelah Irons Add [D64 9] Anemia     12/4/2019  7:49 AM Buelah Irons Add [R79 89] Elevated lactic acid level     12/4/2019  7:50 AM Buelah Irons Add [R79 82] Elevated C-reactive protein (CRP)     12/4/2019  7:50 AM Murlene Handy Add [R70 0] Elevated erythrocyte sedimentation rate     12/4/2019  7:50 AM Murlene Handy Add [N30 90] Cystitis     12/4/2019  7:50 AM Murlene Handy Add [K43 9] Ventral hernia without obstruction or gangrene       ED Disposition     ED Disposition Condition Date/Time Comment    Admit Stable Tue Dec 3, 2019  8:14 PM         Follow-up Information    None         Patient's Medications   Discharge Prescriptions    No medications on file     No discharge procedures on file      ED Provider  Electronically Signed by           Trang Villalobos PA-C  12/03/19 2049       Rachel Bob MD  12/04/19 7699

## 2019-12-03 NOTE — ED NOTES
Pt denies nausea at this time, resting comfortably on litter, no distress noted       Shravan Ness RN  12/03/19 3101

## 2019-12-03 NOTE — PROGRESS NOTES
[facility]        NAME: Santosh Meneses is a 61 y o  female  : 1960    MRN: [id number]  DATE: December 3, 2019  TIME: 11:47 AM    Assessment and Plan   Vomiting, intractability of vomiting not specified, presence of nausea not specified, unspecified vomiting type [R11 10]  1  Vomiting, intractability of vomiting not specified, presence of nausea not specified, unspecified vomiting type           Patient Instructions       Follow up with PCP in 3-5 days  Proceed to  ER    Chief Complaint     Chief Complaint   Patient presents with    Vomiting     vomited x3 today and x2 today  Pt was admitted to [facility] on- with 2 fistula's  Was unable to repair fistula and has a consult at [facility] on19  Needs a work note and something for vomiting         History of Present Illness       Patient was bitten by a spider in may that developed into an abscess  States she had a surgical procedure in  for the abscess  The abscess was never closed and she developed cellulitis and went to hospital  Patient was admitted to [place] [id number] on - where a CT scan was performed  They found 2 fistulas  Fistulas were non-surgically drained  Hospital requested a transfer to [facility] which was denied "because they didn't have enough beds"  They were unable to repair the fistula and she was discharged on Flagyl and was given Ceftriaxone  She began taking the medication last Tuesday  She began vomiting yesterday  She has a surgical consult in [facility] on 19 for hernia abdominal reconstruction surgery  States she hasn't been able to go to work and needs a note  [facility] DM and 2 hernias  Vomiting    This is a new problem  The current episode started yesterday  The problem occurs 2 to 4 times per day  Pertinent negatives include no abdominal pain, chest pain, chills, coughing, diarrhea, dizziness, fever, headaches or myalgias         Review of Systems   Review of Systems Constitutional: Negative for activity change, appetite change, chills and fever  Respiratory: Negative for cough and shortness of breath  Cardiovascular: Negative for chest pain and palpitations  Gastrointestinal: Positive for vomiting  Negative for abdominal distention, abdominal pain, anal bleeding, blood in stool, constipation, diarrhea and nausea  Genitourinary: Negative for dysuria, flank pain, frequency, hematuria and urgency  Musculoskeletal: Negative for myalgias  Skin: Negative for rash  Neurological: Negative for dizziness, light-headedness and headaches           Current Medications       Current Outpatient Medications:     aspirin (ECOTRIN LOW STRENGTH) 81 mg EC tablet, Take 81 mg by mouth daily, Disp: , Rfl:     cephalexin (KEFLEX) 500 mg capsule, Take 1 capsule (500 mg total) by mouth every 8 (eight) hours for 7 days, Disp: 21 capsule, Rfl: 0    Exenatide ER (BYDUREON) 2 MG PEN, Inject 2 mg under the skin once a week, Disp: , Rfl:     metFORMIN (GLUCOPHAGE) 500 mg tablet, Take 500 mg by mouth daily, Disp: , Rfl:     metoprolol succinate (TOPROL-XL) 100 mg 24 hr tablet, Take 100 mg by mouth daily, Disp: , Rfl:     metroNIDAZOLE (FLAGYL) 500 mg tablet, Take 1 tablet (500 mg total) by mouth every 8 (eight) hours for 7 days, Disp: 21 tablet, Rfl: 0    PARoxetine (PAXIL) 10 mg tablet, Take 10 mg by mouth daily, Disp: , Rfl:     simvastatin (ZOCOR) 20 mg tablet, Take 20 mg by mouth daily at bedtime, Disp: , Rfl:     Current Allergies     Allergies as of 12/03/2019 - Reviewed 12/03/2019   Allergen Reaction Noted    Zithromax [azithromycin] Abdominal Pain 04/26/2018    Penicillins Rash 04/26/2018            The following portions of the patient's history were reviewed and updated as appropriate: allergies, current medications, past family history, past medical history, past social history, past surgical history and problem list      Past Medical History:   Diagnosis Date    Depression     Diabetes mellitus ([facility] [address]     Fistula     High cholesterol     Hypertension        Past Surgical History:   Procedure Laterality Date    BOWEL RESECTION      CHOLECYSTECTOMY LAPAROSCOPIC      HERNIA REPAIR         Family History   Problem Relation Age of Onset    Heart disease Mother          Medications have been verified  Objective   /67   Pulse 86   Temp 98 °F (36 7 °C) (Tympanic)   Resp 16   Ht 5' 6" (1 676 m)   Wt 136 kg (300 lb)   SpO2 99%   BMI 48 42 kg/m²        Physical Exam     Physical Exam   Constitutional: She appears well-developed and well-nourished  No distress  Cardiovascular: Normal rate and regular rhythm  Exam reveals no gallop and no friction rub  No murmur heard  Pulmonary/Chest: Effort normal and breath sounds normal  No respiratory distress  She has no wheezes  She has no rales  She exhibits no tenderness  Abdominal: Soft  Bowel sounds are normal  She exhibits no distension and no mass  There is no tenderness  There is no rebound and no guarding  Approximately 5cm x 5cm mildly erythematous packed abscess with light brown discharge  Large epigastric reducible and large RLQ (not reducible) hernias  Lymphadenopathy:     She has no cervical adenopathy  Neurological: She is alert  Skin: Skin is warm  She is not diaphoretic  Psychiatric: She has a normal mood and affect   Her behavior is normal  Judgment and thought content normal

## 2019-12-04 PROBLEM — E11.65 TYPE 2 DIABETES MELLITUS WITH HYPERGLYCEMIA, WITHOUT LONG-TERM CURRENT USE OF INSULIN (HCC): Status: ACTIVE | Noted: 2019-11-24

## 2019-12-04 LAB
ANION GAP SERPL CALCULATED.3IONS-SCNC: 11 MMOL/L (ref 4–13)
BASOPHILS # BLD AUTO: 0.03 THOUSANDS/ΜL (ref 0–0.1)
BASOPHILS NFR BLD AUTO: 0 % (ref 0–1)
BUN SERPL-MCNC: 15 MG/DL (ref 5–25)
CALCIUM SERPL-MCNC: 8.1 MG/DL (ref 8.3–10.1)
CHLORIDE SERPL-SCNC: 103 MMOL/L (ref 100–108)
CO2 SERPL-SCNC: 24 MMOL/L (ref 21–32)
CREAT SERPL-MCNC: 0.68 MG/DL (ref 0.6–1.3)
EOSINOPHIL # BLD AUTO: 0.08 THOUSAND/ΜL (ref 0–0.61)
EOSINOPHIL NFR BLD AUTO: 1 % (ref 0–6)
ERYTHROCYTE [DISTWIDTH] IN BLOOD BY AUTOMATED COUNT: 14.1 % (ref 11.6–15.1)
GFR SERPL CREATININE-BSD FRML MDRD: 96 ML/MIN/1.73SQ M
GLUCOSE SERPL-MCNC: 134 MG/DL (ref 65–140)
GLUCOSE SERPL-MCNC: 146 MG/DL (ref 65–140)
GLUCOSE SERPL-MCNC: 148 MG/DL (ref 65–140)
GLUCOSE SERPL-MCNC: 160 MG/DL (ref 65–140)
GLUCOSE SERPL-MCNC: 162 MG/DL (ref 65–140)
GLUCOSE SERPL-MCNC: 169 MG/DL (ref 65–140)
HCT VFR BLD AUTO: 30.9 % (ref 34.8–46.1)
HGB BLD-MCNC: 9.9 G/DL (ref 11.5–15.4)
IMM GRANULOCYTES # BLD AUTO: 0.05 THOUSAND/UL (ref 0–0.2)
IMM GRANULOCYTES NFR BLD AUTO: 0 % (ref 0–2)
LACTATE SERPL-SCNC: 2 MMOL/L (ref 0.5–2)
LACTATE SERPL-SCNC: 2.8 MMOL/L (ref 0.5–2)
LYMPHOCYTES # BLD AUTO: 2.43 THOUSANDS/ΜL (ref 0.6–4.47)
LYMPHOCYTES NFR BLD AUTO: 20 % (ref 14–44)
MCH RBC QN AUTO: 26.3 PG (ref 26.8–34.3)
MCHC RBC AUTO-ENTMCNC: 32 G/DL (ref 31.4–37.4)
MCV RBC AUTO: 82 FL (ref 82–98)
MONOCYTES # BLD AUTO: 0.95 THOUSAND/ΜL (ref 0.17–1.22)
MONOCYTES NFR BLD AUTO: 8 % (ref 4–12)
NEUTROPHILS # BLD AUTO: 8.46 THOUSANDS/ΜL (ref 1.85–7.62)
NEUTS SEG NFR BLD AUTO: 71 % (ref 43–75)
NRBC BLD AUTO-RTO: 0 /100 WBCS
PLATELET # BLD AUTO: 350 THOUSANDS/UL (ref 149–390)
PMV BLD AUTO: 8.9 FL (ref 8.9–12.7)
POTASSIUM SERPL-SCNC: 3.6 MMOL/L (ref 3.5–5.3)
RBC # BLD AUTO: 3.77 MILLION/UL (ref 3.81–5.12)
SODIUM SERPL-SCNC: 138 MMOL/L (ref 136–145)
WBC # BLD AUTO: 12 THOUSAND/UL (ref 4.31–10.16)

## 2019-12-04 PROCEDURE — 85025 COMPLETE CBC W/AUTO DIFF WBC: CPT | Performed by: INTERNAL MEDICINE

## 2019-12-04 PROCEDURE — 82948 REAGENT STRIP/BLOOD GLUCOSE: CPT

## 2019-12-04 PROCEDURE — 80048 BASIC METABOLIC PNL TOTAL CA: CPT | Performed by: INTERNAL MEDICINE

## 2019-12-04 PROCEDURE — 36415 COLL VENOUS BLD VENIPUNCTURE: CPT | Performed by: NURSE PRACTITIONER

## 2019-12-04 PROCEDURE — 99232 SBSQ HOSP IP/OBS MODERATE 35: CPT | Performed by: INTERNAL MEDICINE

## 2019-12-04 PROCEDURE — 83605 ASSAY OF LACTIC ACID: CPT | Performed by: NURSE PRACTITIONER

## 2019-12-04 RX ORDER — DEXTROSE MONOHYDRATE 25 G/50ML
25 INJECTION, SOLUTION INTRAVENOUS AS NEEDED
Status: DISCONTINUED | OUTPATIENT
Start: 2019-12-04 | End: 2019-12-05 | Stop reason: HOSPADM

## 2019-12-04 RX ORDER — SODIUM CHLORIDE 9 MG/ML
75 INJECTION, SOLUTION INTRAVENOUS CONTINUOUS
Status: DISPENSED | OUTPATIENT
Start: 2019-12-04 | End: 2019-12-05

## 2019-12-04 RX ADMIN — METRONIDAZOLE 500 MG: 500 INJECTION, SOLUTION INTRAVENOUS at 17:27

## 2019-12-04 RX ADMIN — ENOXAPARIN SODIUM 40 MG: 40 INJECTION SUBCUTANEOUS at 09:28

## 2019-12-04 RX ADMIN — Medication 2000 MG: at 12:17

## 2019-12-04 RX ADMIN — NYSTATIN: 100000 POWDER TOPICAL at 09:43

## 2019-12-04 RX ADMIN — SODIUM CHLORIDE 1000 ML: 0.9 INJECTION, SOLUTION INTRAVENOUS at 00:34

## 2019-12-04 RX ADMIN — PAROXETINE 10 MG: 20 TABLET, FILM COATED ORAL at 09:43

## 2019-12-04 RX ADMIN — ASPIRIN 81 MG: 81 TABLET, COATED ORAL at 09:43

## 2019-12-04 RX ADMIN — INSULIN LISPRO 2 UNITS: 100 INJECTION, SOLUTION INTRAVENOUS; SUBCUTANEOUS at 17:20

## 2019-12-04 RX ADMIN — METRONIDAZOLE 500 MG: 500 INJECTION, SOLUTION INTRAVENOUS at 09:28

## 2019-12-04 RX ADMIN — SODIUM CHLORIDE 75 ML/HR: 0.9 INJECTION, SOLUTION INTRAVENOUS at 23:14

## 2019-12-04 RX ADMIN — SODIUM CHLORIDE 75 ML/HR: 0.9 INJECTION, SOLUTION INTRAVENOUS at 00:33

## 2019-12-04 RX ADMIN — INSULIN LISPRO 2 UNITS: 100 INJECTION, SOLUTION INTRAVENOUS; SUBCUTANEOUS at 12:16

## 2019-12-04 RX ADMIN — METRONIDAZOLE 500 MG: 500 INJECTION, SOLUTION INTRAVENOUS at 02:18

## 2019-12-04 RX ADMIN — SODIUM CHLORIDE 75 ML/HR: 0.9 INJECTION, SOLUTION INTRAVENOUS at 17:16

## 2019-12-04 RX ADMIN — PRAVASTATIN SODIUM 40 MG: 40 TABLET ORAL at 17:28

## 2019-12-04 RX ADMIN — INSULIN LISPRO 2 UNITS: 100 INJECTION, SOLUTION INTRAVENOUS; SUBCUTANEOUS at 02:15

## 2019-12-04 RX ADMIN — Medication 2000 MG: at 00:35

## 2019-12-04 RX ADMIN — METOPROLOL SUCCINATE 100 MG: 100 TABLET, FILM COATED, EXTENDED RELEASE ORAL at 09:43

## 2019-12-04 NOTE — PLAN OF CARE
Problem: PAIN - ADULT  Goal: Verbalizes/displays adequate comfort level or baseline comfort level  Description  Interventions:  - Encourage patient to monitor pain and request assistance  - Assess pain using appropriate pain scale  - Administer analgesics based on type and severity of pain and evaluate response  - Implement non-pharmacological measures as appropriate and evaluate response  - Consider cultural and social influences on pain and pain management  - Notify physician/advanced practitioner if interventions unsuccessful or patient reports new pain  Outcome: Progressing     Problem: INFECTION - ADULT  Goal: Absence or prevention of progression during hospitalization  Description  INTERVENTIONS:  - Assess and monitor for signs and symptoms of infection  - Monitor lab/diagnostic results  - Monitor all insertion sites, i e  indwelling lines, tubes, and drains  - Monitor endotracheal if appropriate and nasal secretions for changes in amount and color  - [place] appropriate cooling/warming therapies per order  - Administer medications as ordered  - Instruct and encourage patient and family to use good hand hygiene technique  - Identify and instruct in appropriate isolation precautions for identified infection/condition  Outcome: Progressing  Goal: Absence of fever/infection during neutropenic period  Description  INTERVENTIONS:  - Monitor WBC    Outcome: Progressing     Problem: SKIN/TISSUE INTEGRITY - ADULT  Goal: Incision(s), wounds(s) or drain site(s) healing without S/S of infection  Description  INTERVENTIONS  - Assess and document risk factors for skin impairment   - Assess and document dressing, incision, wound bed, drain sites and surrounding tissue  - Consider nutrition services referral as needed  - Provide patient/ family education   Outcome: Progressing

## 2019-12-04 NOTE — ASSESSMENT & PLAN NOTE
Lab Results   Component Value Date    HGBA1C 7 7 (H) 11/21/2019       No results for input(s): POCGLU in the last 72 hours      Blood Sugar Average: Last 72 hrs:     · Glucose 161  · Diabetic diet  · Fingerstick blood sugar checks with sliding scale coverage  · Hold home metformin and exenatide while in hospital

## 2019-12-04 NOTE — ASSESSMENT & PLAN NOTE
Lab Results   Component Value Date    HGBA1C 7 7 (H) 11/21/2019       Recent Labs     12/04/19  0122 12/04/19  0733 12/04/19  1213 12/04/19  1601   POCGLU 162* 148* 160* 169*       Blood Sugar Average: Last 72 hrs:  (P) 159 75     · Initiate the hypoglycemia protocol  · Fingerstick blood sugar checks ACHS with sliding scale coverage  · Hold home metformin and exenatide during the hospitalization

## 2019-12-04 NOTE — PLAN OF CARE
Problem: PAIN - ADULT  Goal: Verbalizes/displays adequate comfort level or baseline comfort level  Description  Interventions:  - Encourage patient to monitor pain and request assistance  - Assess pain using appropriate pain scale  - Administer analgesics based on type and severity of pain and evaluate response  - Implement non-pharmacological measures as appropriate and evaluate response  - Consider cultural and social influences on pain and pain management  - Notify physician/advanced practitioner if interventions unsuccessful or patient reports new pain  Outcome: Progressing     Problem: INFECTION - ADULT  Goal: Absence or prevention of progression during hospitalization  Description  INTERVENTIONS:  - Assess and monitor for signs and symptoms of infection  - Monitor lab/diagnostic results  - Monitor all insertion sites, i e  indwelling lines, tubes, and drains  - Monitor endotracheal if appropriate and nasal secretions for changes in amount and color  - [place] appropriate cooling/warming therapies per order  - Administer medications as ordered  - Instruct and encourage patient and family to use good hand hygiene technique  - Identify and instruct in appropriate isolation precautions for identified infection/condition  Outcome: Progressing  Goal: Absence of fever/infection during neutropenic period  Description  INTERVENTIONS:  - Monitor WBC    Outcome: Progressing

## 2019-12-04 NOTE — UTILIZATION REVIEW
Initial Clinical Review    Admission: Date/Time/Statement: Inpatient Admission Orders (From admission, onward)     Ordered        12/03/19 2015  Inpatient Admission  Once                   Orders Placed This Encounter   Procedures    Inpatient Admission     Standing Status:   Standing     Number of Occurrences:   1     Order Specific Question:   Admitting Physician     Answer:   Dakota Aguilar [[id number]]     Order Specific Question:   Level of Care     Answer:   Med Surg [[id number]]     Order Specific Question:   Estimated length of stay     Answer:   More than 2 Midnights     Order Specific Question:   Certification     Answer:   I certify that inpatient services are medically necessary for this patient for a duration of greater than two midnights  See H&P and MD Progress Notes for additional information about the patient's course of treatment  ED Arrival Information     Expected Arrival Acuity Means of Arrival Escorted By Service Admission Type    - 12/3/2019 12:18 Urgent Walk-In Self Hospitalist Urgent    Arrival Complaint    Nausea        Chief Complaint   Patient presents with    Vomiting     Pt with recent admission for same, had been feeling better, now with nausea and vomiting that began yesterday  Assessment/Plan: 61year old female to the ED from home with complaints of vomiting  Recently an inpatient at [facility] from 11/22-11/26 for for abdominal pain and nonsurgical drainage of abdominal wall fistulas  Wound culture at that time showed klebsiella  Plan for surgery at [facility] on 12/5  She returns due to nausea and  Vomiting  Admitted to inpatient for cutaneous abscess of abdominal wall  IWBC's elevated, initial lactic acid 2 9  IV fluids and iv abx intitiated in ED  Gen surg consult  Poor appetite and po intake due to nausea  Reducible Epigastric hernia and non reducible right lower abdomen hernia present  Abdomen is nontender  There is a healing incision to the right abdominal wall  There are several yellow ecchymoses from prior heparin injections  Gen surg consult 12/4: Continue IV abx  NO need for operative management  [facility] up at [facility] as planned  Tolerating diet since last night    ED Triage Vitals   Temperature Pulse Respirations Blood Pressure SpO2   12/03/19 1249 12/03/19 1249 12/03/19 1249 12/03/19 1249 12/03/19 1249   98 5 °F (36 9 °C) 86 16 150/82 97 %      Temp Source Heart Rate Source Patient Position - Orthostatic VS BP Location FiO2 (%)   12/03/19 1249 12/03/19 1527 12/03/19 1249 12/03/19 1249 --   Oral Monitor Sitting Right arm       Pain Score       12/03/19 1249       4        Wt Readings from Last 1 Encounters:   12/03/19 134 kg (295 lb 6 7 oz)     Additional Vital Signs:   Date/Time  Temp  Pulse  Resp  BP  SpO2  O2 Device  Patient Position - Orthostatic VS   12/04/19 0858  97 9 °F (36 6 °C)  76  18  118/68  --  --  Sitting   12/04/19 0612  98 °F (36 7 °C)  94  18  112/64  94 %  None (Room air)  Lying   12/04/19 0104  98 1 °F (36 7 °C)  83  18  136/64  96 %  None (Room air)  Lying   12/04/19 0040  98 5 °F (36 9 °C)  83  20  137/65  95 %  --  --   12/03/19 2300  --  84  --  129/59  95 %  --  --   12/03/19 1907  --  80  18  128/61  98 %  None (Room air)  Lying   12/03/19 1816  --  82  18  139/63  96 %  None (Room air)  Lying   12/03/19 1643  --  73  18  152/74  96 %  None (Room air)  Lying   12/03/19 1527  --  69  18  137/69  98 %  None (Room air)  Lying   12/03/19 1249  98 5 °F (36 9 °C)  86  16  150/82           Pertinent Labs/Diagnostic Test Results:   CT a P 12/3:Persistent subcutaneous abscess with apparent cutaneous fistula within the complex ventral abdominal hernia   No significant change in size of the abscess   Persistent concern for developing enterocutaneous fistula   No evidence of bowel obstruction  Results from last 7 days   Lab Units 12/04/19  0517 12/03/19  1403   WBC Thousand/uL 12 00* 15 68*   HEMOGLOBIN g/dL 9 9* 11 6   HEMATOCRIT % 30 9* 37 1 PLATELETS Thousands/uL 350 466*   NEUTROS ABS Thousands/µL 8 46* 12 22*         Results from last 7 days   Lab Units 12/04/19  0517 12/03/19  1404   SODIUM mmol/L 138 137   POTASSIUM mmol/L 3 6 4 5   CHLORIDE mmol/L 103 99*   CO2 mmol/L 24 25   ANION GAP mmol/L 11 13   BUN mg/dL 15 24   CREATININE mg/dL 0 68 0 97   EGFR ml/min/1 73sq m 96 64   CALCIUM mg/dL 8 1* 9 2     Results from last 7 days   Lab Units 12/03/19  1404   AST U/L 14   ALT U/L 18   ALK PHOS U/L 90   TOTAL PROTEIN g/dL 8 1   ALBUMIN g/dL 3 6   TOTAL BILIRUBIN mg/dL 0 33     Results from last 7 days   Lab Units 12/04/19  1213 12/04/19  0733 12/04/19  0122   POC GLUCOSE mg/dl 160* 148* 162*     Results from last 7 days   Lab Units 12/04/19  0517 12/03/19  1404   GLUCOSE RANDOM mg/dL 134 161*       Results from last 7 days   Lab Units 12/04/19  0305 12/03/19  2331 12/03/19  1403   LACTIC ACID mmol/L 2 0 2 8* 2 9*       Results from last 7 days   Lab Units 12/03/19  1404   LIPASE u/L 170     Results from last 7 days   Lab Units 12/03/19  1404 12/03/19  1403   CRP mg/L 34 5*  --    SED RATE mm/hour  --  58*         Results from last 7 days   Lab Units 12/03/19  1406   CLARITY UA  Slightly Cloudy   COLOR UA  Uzma   SPEC GRAV UA  >=1 030   PH UA  5 5   GLUCOSE UA mg/dl Negative   KETONES UA mg/dl Negative   BLOOD UA  Small*   PROTEIN UA mg/dl Trace*   NITRITE UA  Negative   BILIRUBIN UA  Small*   UROBILINOGEN UA E U /dl 0 2   LEUKOCYTES UA  Small*   WBC UA /hpf 10-20*   RBC UA /hpf 4-10*   BACTERIA UA /hpf Moderate*   EPITHELIAL CELLS WET PREP /hpf Moderate*   MUCUS THREADS  Occasional*     Results from last 7 days   Lab Units 12/03/19  1406   URINE CULTURE  Culture too young- will reincubate       ED Treatment:   Medication Administration from 12/03/2019 1218 to 12/04/2019 1359       Date/Time Order Dose Route Action     12/03/2019 1409 sodium chloride 0 9 % bolus 1,000 mL 1,000 mL Intravenous New Bag     12/03/2019 1409 ondansetron (ZOFRAN) injection 4 mg 4 mg Intravenous Given     12/03/2019 1720 iohexol (OMNIPAQUE) 240 MG/ML solution 50 mL 50 mL Oral Given     12/03/2019 1740 piperacillin-tazobactam (ZOSYN) 3 375 g in sodium chloride 0 9 % 50 mL IVPB 3 375 g Intravenous New Bag     12/03/2019 2004 sodium chloride 0 9 % bolus 1,000 mL 1,000 mL Intravenous New Bag     12/04/2019 0943 metoprolol succinate (TOPROL-XL) 24 hr tablet 100 mg 100 mg Oral Given     12/04/2019 0943 aspirin (ECOTRIN LOW STRENGTH) EC tablet 81 mg 81 mg Oral Given     12/04/2019 0943 PARoxetine (PAXIL) tablet 10 mg 10 mg Oral Given     12/04/2019 0033 sodium chloride 0 9 % infusion 75 mL/hr Intravenous New Bag     12/04/2019 0928 enoxaparin (LOVENOX) subcutaneous injection 40 mg 40 mg Subcutaneous Given     12/04/2019 0943 nystatin (MYCOSTATIN) powder   Topical Given     12/04/2019 1217 cefepime (MAXIPIME) 2,000 mg in dextrose 5 % 50 mL IVPB 2,000 mg Intravenous New Bag     12/04/2019 0035 cefepime (MAXIPIME) 2,000 mg in dextrose 5 % 50 mL IVPB 2,000 mg Intravenous New Bag     12/04/2019 0928 metroNIDAZOLE (FLAGYL) IVPB (premix) 500 mg 500 mg Intravenous New Bag     12/04/2019 0218 metroNIDAZOLE (FLAGYL) IVPB (premix) 500 mg 500 mg Intravenous New Bag     12/04/2019 0034 sodium chloride 0 9 % bolus 1,000 mL 1,000 mL Intravenous New Bag     12/04/2019 1216 insulin lispro (HumaLOG) 100 units/mL subcutaneous injection 2-12 Units 2 Units Subcutaneous Given     12/04/2019 0215 insulin lispro (HumaLOG) 100 units/mL subcutaneous injection 2-12 Units 2 Units Subcutaneous Given        Past Medical History:   Diagnosis Date    Depression     [facility] mellitus ([facility]     Fistula     High cholesterol     Hypertension      Present on Admission:   Benign essential hypertension   Cutaneous abscess of abdominal wall   [facility]      Admitting Diagnosis: Nausea [R11 0]  Age/Sex: 61 y o  female  Admission Orders:    Scheduled Medications:    Medications:  aspirin 81 mg Oral Daily cefepime 2,000 mg Intravenous Q12H   enoxaparin 40 mg Subcutaneous Daily   insulin lispro 2-12 Units Subcutaneous TID AC   insulin lispro 2-12 Units Subcutaneous HS   metoprolol succinate 100 mg Oral Daily   metroNIDAZOLE 500 mg Intravenous Q8H   nystatin  Topical BID   PARoxetine 10 mg Oral Daily   pravastatin 40 mg Oral Daily With Dinner     Continuous IV Infusions:     PRN Meds:    ondansetron 4 mg Intravenous Q6H PRN       Network Utilization Review Department  [email or web address]  ATTENTION: Please call with any questions or concerns to [phone] and carefully listen to the prompts so that you are directed to the right person  All voicemails are confidential   Alexandra Diaz all requests for admission clinical reviews, approved or denied determinations and any other requests to dedicated fax number below belonging to the campus where the patient is receiving treatment   List of dedicated fax numbers for the Facilities:  [address] DENIALS (Administrative/Medical Necessity) [phone]   [address] (Maternity/NICU/Pediatrics) [phone]   Kianna Courser [phone]   Alan Mcneil [phone]   Maik Grit [phone]   145 [facility] 1525 [facility] [phone]   Jun Hurley [phone]   [facility] [address] [phone]

## 2019-12-04 NOTE — ASSESSMENT & PLAN NOTE
· Check an iron panel, vitamin B12 level, and folate level  · Follow the CBC  · Transfuse for a hemoglobin less than 7 g/dl

## 2019-12-04 NOTE — ASSESSMENT & PLAN NOTE
· BP reviewed and acceptable  · Continue metoprolol succinate with hold parameters  · Under blood pressure

## 2019-12-04 NOTE — NURSING NOTE
Wound care completed  1/4 inch thick packing inserted into wound as ordered  No drainage noted to wound area  Tolerated procedure well, denies pain  Dry dressing covered with gauze 4X4

## 2019-12-04 NOTE — CONSULTS
Consultation - General Surgery   Pb Mohr 61 y o  female MRN: [id number]  Unit/Bed#: OVR 07 Encounter: [id number]    Assessment/Plan     Assessment:  60 y/o female with a persistent abdominal abscess  + vomiting last 2 days  There is a chronic draining fistula present     Plan:    Continue cefepime and flagyl  Diet as tolerated  Packing to wound daily  No need for operative intervention at this time  Likely stable for d/c tomorrow for the patient to be seen at her outpatient surgery appointment in [place]    History of Present Illness     HPI:  Pb Mohr is a 61 y o  female who presents with vomiting for 2 days  She was admitted 2 weeks ago for an abdominal abscess  She states she is still able to pack the wound  She denies any pain or fever  Tolerating a diet since last night      Review of Systems   Constitutional: Negative  HENT: Negative  Eyes: Negative  Respiratory: Negative  Cardiovascular: Negative  Gastrointestinal: Negative  Endocrine: Negative  Musculoskeletal: Negative  Skin: Negative  Neurological: Negative          Historical Information   Past Medical History:   Diagnosis Date    Depression     Diabetes mellitus ([address] )     Fistula     High cholesterol     Hypertension      Past Surgical History:   Procedure Laterality Date    BOWEL RESECTION      CHOLECYSTECTOMY LAPAROSCOPIC      HERNIA REPAIR       Social History   Social History     Substance and Sexual Activity   Alcohol Use Yes    Comment: rarely     Social History     Substance and Sexual Activity   Drug Use Never     Social History     Tobacco Use   Smoking Status Never Smoker   Smokeless Tobacco Never Used     Family History: non-contributory    Meds/Allergies   current meds:   Current Facility-Administered Medications   Medication Dose Route Frequency    aspirin (ECOTRIN LOW STRENGTH) EC tablet 81 mg  81 mg Oral Daily    cefepime (MAXIPIME) 2,000 mg in dextrose 5 % 50 mL IVPB  2,000 mg Intravenous Q12H    enoxaparin (LOVENOX) subcutaneous injection 40 mg  40 mg Subcutaneous Daily    insulin lispro (HumaLOG) 100 units/mL subcutaneous injection 2-12 Units  2-12 Units Subcutaneous TID AC    insulin lispro (HumaLOG) 100 units/mL subcutaneous injection 2-12 Units  2-12 Units Subcutaneous HS    metoprolol succinate (TOPROL-XL) 24 hr tablet 100 mg  100 mg Oral Daily    metroNIDAZOLE (FLAGYL) IVPB (premix) 500 mg  500 mg Intravenous Q8H    nystatin (MYCOSTATIN) powder   Topical BID    ondansetron (ZOFRAN) injection 4 mg  4 mg Intravenous Q6H PRN    PARoxetine (PAXIL) tablet 10 mg  10 mg Oral Daily    pravastatin (PRAVACHOL) tablet 40 mg  40 mg Oral Daily With Dinner    sodium chloride 0 9 % infusion  75 mL/hr Intravenous Continuous     Allergies   Allergen Reactions    Zithromax [Azithromycin] Abdominal Pain    Penicillins Rash       Objective   First Vitals:   Blood Pressure: 150/82 (12/03/19 1249)  Pulse: 86 (12/03/19 1249)  Temperature: 98 5 °F (36 9 °C) (12/03/19 1249)  Temp Source: Oral (12/03/19 1249)  Respirations: 16 (12/03/19 1249)  Weight - Scale: 134 kg (295 lb 6 7 oz) (12/03/19 1249)  SpO2: 97 % (12/03/19 1249)    Current Vitals:   Blood Pressure: 112/64 (12/04/19 0612)  Pulse: 94 (12/04/19 0612)  Temperature: 98 °F (36 7 °C) (12/04/19 0612)  Temp Source: Oral (12/04/19 0612)  Respirations: 18 (12/04/19 0612)  Weight - Scale: 134 kg (295 lb 6 7 oz) (12/03/19 1249)  SpO2: 94 % (12/04/19 0612)      Intake/Output Summary (Last 24 hours) at 12/4/2019 0749  Last data filed at 12/4/2019 0301  Gross per 24 hour   Intake 1000 ml   Output 400 ml   Net 600 ml       Invasive Devices     Peripheral Intravenous Line            Peripheral IV 12/03/19 Right Antecubital less than 1 day                Physical Exam   Constitutional: She is oriented to person, place, and time  She appears well-developed and well-nourished  HENT:   Head: Normocephalic and atraumatic     Cardiovascular: Normal rate and regular rhythm  Pulmonary/Chest: Effort normal    Abdominal: Soft  She exhibits no distension  There is no tenderness  A hernia (large ventral) is present  Musculoskeletal: She exhibits no edema  Neurological: She is alert and oriented to person, place, and time  Skin: Skin is warm and dry  Lab Results:   CBC:   Lab Results   Component Value Date    WBC 12 00 (H) 12/04/2019    HGB 9 9 (L) 12/04/2019    HCT 30 9 (L) 12/04/2019    MCV 82 12/04/2019     12/04/2019    MCH 26 3 (L) 12/04/2019    MCHC 32 0 12/04/2019    RDW 14 1 12/04/2019    MPV 8 9 12/04/2019    NRBC 0 12/04/2019   , CMP:   Lab Results   Component Value Date    SODIUM 138 12/04/2019    K 3 6 12/04/2019     12/04/2019    CO2 24 12/04/2019    BUN 15 12/04/2019    CREATININE 0 68 12/04/2019    CALCIUM 8 1 (L) 12/04/2019    AST 14 12/03/2019    ALT 18 12/03/2019    ALKPHOS 90 12/03/2019    EGFR 96 12/04/2019     Imaging: I have personally reviewed pertinent reports  CT abd/pelvis:  FINDINGS:     ABDOMEN     LOWER CHEST:  Clear lung bases      LIVER/BILIARY TREE:  Unremarkable      GALLBLADDER:  Cholecystectomy      SPLEEN:  Unremarkable      PANCREAS:  Unremarkable      ADRENAL GLANDS:  Unremarkable      KIDNEYS/URETERS:  Unremarkable  No hydronephrosis      STOMACH AND BOWEL:  Again identified is a complex ventral abdominal hernia containing loops of small bowel and colon  Small bowel loops are mostly though incompletely opacified  Cutaneous and subcutaneous complex collection containing fluid and gas is   again identified, not significantly changed in overall size, measuring approximately 4 3 x 6 5 x 2 4 cm  There is no extravasation of contrast in adjacent small bowel loops however developing enterocutaneous fistula cannot be excluded      The cecum is contained within the inferior aspect of the hernia    There is narrowing and mild twisting of the ascending colon within the hernia, unchanged from the prior exam and without evidence of bowel obstruction  Contrast is present throughout the   right colon and transverse colon  Moderate amount of stool and gas throughout the colon      APPENDIX:  No findings to suggest appendicitis      ABDOMINOPELVIC CAVITY:  No lymphadenopathy      VESSELS:  No abdominal aortic aneurysm  Patent portal and splenic veins      PELVIS     REPRODUCTIVE ORGANS:  No pelvic mass or fluid      URINARY BLADDER:  Unremarkable      ABDOMINAL WALL/INGUINAL REGIONS:  Unremarkable      OSSEOUS STRUCTURES:  No acute fracture or destructive osseous lesion      IMPRESSION:        Persistent subcutaneous abscess with apparent cutaneous fistula within the complex ventral abdominal hernia  No significant change in size of the abscess  Persistent concern for developing enterocutaneous fistula  No evidence of bowel obstruction           EKG, Pathology, and Other Studies: I have personally reviewed pertinent reports  Counseling / Coordination of Care  Total floor / unit time spent today 20 minutes  Greater than 50% of total time was spent with the patient and / or family counseling and / or coordination of care  A description of the counseling / coordination of care: pt with chronic draining fistula

## 2019-12-04 NOTE — ASSESSMENT & PLAN NOTE
· Reducible epigastric hernia, and non reducible right lower abdominal hernia   · CT A/P:  Complex ventral abdominal hernia containing loops of small bowel and colon  · Surgery consult

## 2019-12-04 NOTE — NURSING NOTE
Report called to Med surg  Jenet Kanner RN and Uzma RN  patient belongings and medications taken to unit

## 2019-12-04 NOTE — H&P
[address] Internal Medicine    H&P- Radha Vance 1960, 61 y o  female MRN: [id number]    Unit/Bed#: OVR 07 Encounter: [id number]    Primary Care Provider: Kristin Sellers MD   Date and time admitted to hospital: 12/3/2019 12:45 PM        Cutaneous abscess of abdominal wall  Assessment & Plan  · Presented to urgent care for vomiting and sent to ER for evaluation  · Admitted at [facility] 11/21-26 for abdominal pain and nonsurgical drainage of abdominal wall fistulas  · Wound culture at that time showed Klebsiella  · Previously had an abdominal wall abscess with washout at [facility] in [place] in June  · Plan was for surgery at [facility] 12/5 due to complex anatomy with multiple hernias and chronic wound  · CT A/P:  Persistent subcutaneous abscess with apparent cutaneous fistula within the complex ventral abdominal hernia  No significant change in size of the abscess  Persistent concern for developing enterocutaneous fistula  No evidence of bowel obstruction  · WBC 15 68, initial lactate 2 9, afebrile  · CRP 34 5  · Received 2 L normal saline in the ER and Zosyn   · Initial plan was to transfer to [facility], but surgeon at [facility] did not feel it to be an urgent procedure  The patient is scheduled for outpatient visit  · Start cefepime and Flagyl   · Supportive care    * Hernia of abdominal wall  Assessment & Plan  · Reducible epigastric hernia, and non reducible right lower abdominal hernia   · CT A/P:  Complex ventral abdominal hernia containing loops of small bowel and colon  · Surgery consult    Type 2 diabetes mellitus ([address] )  Assessment & Plan  Lab Results   Component Value Date    HGBA1C 7 7 (H) 11/21/2019       No results for input(s): POCGLU in the last 72 hours      Blood Sugar Average: Last 72 hrs:     · Glucose 161  · Diabetic diet  · Fingerstick blood sugar checks with sliding scale coverage  · Hold home metformin and exenatide while in hospital    Benign essential hypertension  Assessment & Plan  · BP reviewed and acceptable  · Continue metoprolol succinate with hold parameters  · Under blood pressure    Bacteriuria  Assessment & Plan  · UA with moderate bacteria, 10-20 WBCs  Asymptomatic  · Culture results pending      VTE Prophylaxis: Enoxaparin (Lovenox)  / sequential compression device   Code Status:  Full  POLST: POLST form is not discussed and not completed at this time  Discussion with family:  Patient    Anticipated Length of Stay:  Patient will be admitted on an Inpatient basis with an anticipated length of stay of  greater than 2 midnights  Justification for Hospital Stay:  Abdominal wall abscess    Total Time for Visit, including Counseling / Coordination of Care: 30 minutes  Greater than 50% of this total time spent on direct patient counseling and coordination of care  Chief Complaint:   Nausea and vomiting     History of Present Illness:    Julius Heimlich is a 61 y o  female with a history of 2 abdominal hernias, abdominal abscess with fistula, and non-insulin-dependent type 2 diabetes, who presents with a two day history of nausea, vomiting, and decreased oral intake  She was recently admitted on 11/21-26 to this hospital for abdominal wall abscess with concern for fistula, and multiple ventral hernias  She says the nausea is constant, and she has not been able to keep food down  She says she has been trying to eat light including soda and cookies, but she has been vomiting everything she tries to take orally  She denies abdominal pain or hematemesis  No fevers or chills  Review of Systems:    Review of Systems   Constitutional: Positive for appetite change  Negative for chills and fever  Respiratory: Negative for cough and shortness of breath  Cardiovascular: Negative for chest pain, palpitations and leg swelling  Gastrointestinal: Positive for nausea and vomiting  Negative for abdominal distention, abdominal pain, constipation and diarrhea     Genitourinary: Negative for dysuria and frequency  Musculoskeletal: Negative for arthralgias and myalgias  Neurological: Negative for dizziness, syncope, weakness and headaches  All other systems reviewed and are negative  Past Medical and Surgical History:     Past Medical History:   Diagnosis Date    Depression     Diabetes mellitus ([address] )     Fistula     High cholesterol     Hypertension        Past Surgical History:   Procedure Laterality Date    BOWEL RESECTION      CHOLECYSTECTOMY LAPAROSCOPIC      HERNIA REPAIR         Meds/Allergies:    Prior to Admission medications    Medication Sig Start Date End Date Taking? Authorizing Provider   aspirin (ECOTRIN LOW STRENGTH) 81 mg EC tablet Take 81 mg by mouth daily   Yes Historical Provider, MD   cephalexin (KEFLEX) 500 mg capsule Take 1 capsule (500 mg total) by mouth every 8 (eight) hours for 7 days 11/26/19 12/3/19 Yes Teofilo Rabago MD   Exenatide ER (BYDUREON) 2 MG PEN Inject 2 mg under the skin once a week   Yes Historical Provider, MD   metFORMIN (GLUCOPHAGE) 500 mg tablet Take 500 mg by mouth daily   Yes Historical Provider, MD   metoprolol succinate (TOPROL-XL) 100 mg 24 hr tablet Take 100 mg by mouth daily 10/1/19  Yes Historical Provider, MD   metroNIDAZOLE (FLAGYL) 500 mg tablet Take 1 tablet (500 mg total) by mouth every 8 (eight) hours for 7 days 11/26/19 12/3/19 Yes Teofilo Rabago MD   PARoxetine (PAXIL) 10 mg tablet Take 10 mg by mouth daily   Yes Historical Provider, MD   simvastatin (ZOCOR) 20 mg tablet Take 20 mg by mouth daily at bedtime   Yes Historical Provider, MD     I have reviewed home medications with patient personally  Allergies:    Allergies   Allergen Reactions    Zithromax [Azithromycin] Abdominal Pain    Penicillins Rash       Social History:     Marital Status: /Civil Union   Occupation:  Dietary aide  Patient Pre-hospital Living Situation:  Lives with   Patient Pre-hospital Level of Mobility:  Independent  Patient Pre-hospital Diet Restrictions:  Diabetic  Substance Use History:   Social History     Substance and Sexual Activity   Alcohol Use Yes    Comment: rarely     Social History     Tobacco Use   Smoking Status Never Smoker   Smokeless Tobacco Never Used     Social History     Substance and Sexual Activity   Drug Use Never       Family History:    Family History   Problem Relation Age of Onset    Heart disease Mother        Physical Exam:     Vitals:   Blood Pressure: 136/64 (12/04/19 0104)  Pulse: 83 (12/04/19 0104)  Temperature: 98 1 °F (36 7 °C) (12/04/19 0104)  Temp Source: Oral (12/04/19 0104)  Respirations: 18 (12/04/19 0104)  Weight - Scale: 134 kg (295 lb 6 7 oz) (12/03/19 1249)  SpO2: 96 % (12/04/19 0104)    Physical Exam   Constitutional: She is oriented to person, place, and time  She appears well-developed and well-nourished  HENT:   Head: Normocephalic and atraumatic  Mouth/Throat: Oropharynx is clear and moist    Eyes: Pupils are equal, round, and reactive to light  EOM are normal    Neck: Normal range of motion  Neck supple  Cardiovascular: Normal rate, regular rhythm, normal heart sounds and intact distal pulses  Exam reveals no gallop and no friction rub  No murmur heard  Pulmonary/Chest: Effort normal and breath sounds normal  No respiratory distress  Abdominal: Soft  Bowel sounds are normal  She exhibits mass  She exhibits no distension  There is no tenderness  There is no guarding  Reducible Epigastric hernia and non reducible right lower abdomen hernia present  Abdomen is nontender  There is a healing incision to the right abdominal wall  There are several yellow ecchymoses from prior heparin injections  Musculoskeletal: Normal range of motion  She exhibits no edema, tenderness or deformity  Neurological: She is alert and oriented to person, place, and time  Skin: Skin is warm and dry  Capillary refill takes less than 2 seconds     Rash under abdominal pannus   Nursing note and vitals reviewed  Additional Data:     Lab Results: I have personally reviewed pertinent reports  Results from last 7 days   Lab Units 12/03/19  1403   WBC Thousand/uL 15 68*   HEMOGLOBIN g/dL 11 6   HEMATOCRIT % 37 1   PLATELETS Thousands/uL 466*   NEUTROS PCT % 78*   LYMPHS PCT % 16   MONOS PCT % 5   EOS PCT % 0     Results from last 7 days   Lab Units 12/03/19  1404   SODIUM mmol/L 137   POTASSIUM mmol/L 4 5   CHLORIDE mmol/L 99*   CO2 mmol/L 25   BUN mg/dL 24   CREATININE mg/dL 0 97   ANION GAP mmol/L 13   CALCIUM mg/dL 9 2   ALBUMIN g/dL 3 6   TOTAL BILIRUBIN mg/dL 0 33   ALK PHOS U/L 90   ALT U/L 18   AST U/L 14   GLUCOSE RANDOM mg/dL 161*         Results from last 7 days   Lab Units 12/04/19  0122   POC GLUCOSE mg/dl 162*         Results from last 7 days   Lab Units 12/03/19  2331 12/03/19  1403   LACTIC ACID mmol/L 2 8* 2 9*       Imaging: I have personally reviewed pertinent reports  and I have personally reviewed pertinent films in PACS    CT abdomen pelvis with contrast   Final Result by Laura Velasco MD (12/03 1749)         Persistent subcutaneous abscess with apparent cutaneous fistula within the complex ventral abdominal hernia  No significant change in size of the abscess  Persistent concern for developing enterocutaneous fistula  No evidence of bowel obstruction  Workstation performed: [id number]             EKG, Pathology, and Other Studies Reviewed on Admission:   EKG:     Allscripts / Epic Records Reviewed: Yes     ** Please Note: This note has been constructed using a voice recognition system   **

## 2019-12-04 NOTE — ED ATTENDING ATTESTATION
12/3/2019  I, Jeri Fisher MD, saw and evaluated the patient  I have discussed the patient with the resident/non-physician practitioner and agree with the resident's/non-physician practitioner's findings, Plan of Care, and MDM as documented in the resident's/non-physician practitioner's note, except where noted  All available labs and Radiology studies were reviewed  I was present for key portions of any procedure(s) performed by the resident/non-physician practitioner and I was immediately available to provide assistance  At this point I agree with the current assessment done in the Emergency Department  I have conducted an independent evaluation of this patient a history and physical is as follows:    60 yo female with history of hernia repair, wound dehiscence and intra-abdominal abscess presents with n/v  Labs consistent with infection and CTAP shows persistent abscess and fistula  SIENNA discussed case with Surgery and Kady, where patient has follow-up appointment later this week with Surgery (new evaluation)  Will be admitted at [address] [place] [address] to Hospitalist service for IV antibiotics  No emergent surgical intervention planned  Patient is aaox4 and HD stable      ED Course         Critical Care Time  Procedures

## 2019-12-04 NOTE — ASSESSMENT & PLAN NOTE
· Presented to urgent care for vomiting and sent to ER for evaluation  · Admitted at [facility] 11/21-26 for abdominal pain and nonsurgical drainage of abdominal wall fistulas  · Wound culture at that time showed Klebsiella  · Previously had an abdominal wall abscess with washout at [facility] in [place] in June  · Plan was for surgery at [facility] 12/5 due to complex anatomy with multiple hernias and chronic wound  · CT A/P:  Persistent subcutaneous abscess with apparent cutaneous fistula within the complex ventral abdominal hernia  No significant change in size of the abscess  Persistent concern for developing enterocutaneous fistula  No evidence of bowel obstruction  · WBC 15 68, initial lactate 2 9, afebrile  · CRP 34 5  · Received 2 L normal saline in the ER and Zosyn   · Initial plan was to transfer to [facility], but surgeon at [facility] did not feel it to be an urgent procedure    The patient is scheduled for outpatient visit on Friday, 12/06/2019  · Continue IV cefepime and IV metronidazole  · Continue NSS IV fluids at 75 ml/hr

## 2019-12-04 NOTE — ASSESSMENT & PLAN NOTE
· Reducible epigastric hernia, and non reducible right lower abdominal hernia   · CT A/P:  Complex ventral abdominal hernia containing loops of small bowel and colon  · Outpatient follow-up with General Surgery at [facility] on Friday, 12/06/2019

## 2019-12-04 NOTE — ASSESSMENT & PLAN NOTE
· Presented to urgent care for vomiting and sent to ER for evaluation  · Admitted at [facility] 11/21-26 for abdominal pain and nonsurgical drainage of abdominal wall fistulas  · Wound culture at that time showed Klebsiella  · Previously had an abdominal wall abscess with washout at [facility] in [place] in June  · Plan was for surgery at [facility] 12/5 due to complex anatomy with multiple hernias and chronic wound  · CT A/P:  Persistent subcutaneous abscess with apparent cutaneous fistula within the complex ventral abdominal hernia  No significant change in size of the abscess  Persistent concern for developing enterocutaneous fistula  No evidence of bowel obstruction  · WBC 15 68, initial lactate 2 9, afebrile  · CRP 34 5  · Received 2 L normal saline in the ER and Zosyn   · Initial plan was to transfer to [facility], but surgeon at [facility] did not feel it to be an urgent procedure    The patient is scheduled for outpatient visit  · Start cefepime and Flagyl   · Supportive care

## 2019-12-04 NOTE — PROGRESS NOTES
Progress Note - Arely Henderson 1960, 61 y o  female MRN: [id number]    Unit/Bed#: -01 Encounter: [id number]    Primary Care Provider: Phil Ibarra MD   Date and time admitted to hospital: 12/3/2019 12:45 PM        * Cutaneous abscess of abdominal wall  Assessment & Plan  · Presented to urgent care for vomiting and sent to ER for evaluation  · Admitted at [facility] 11/21-26 for abdominal pain and nonsurgical drainage of abdominal wall fistulas  · Wound culture at that time showed Klebsiella  · Previously had an abdominal wall abscess with washout at [facility] in [place] in June  · Plan was for surgery at [facility] 12/5 due to complex anatomy with multiple hernias and chronic wound  · CT A/P:  Persistent subcutaneous abscess with apparent cutaneous fistula within the complex ventral abdominal hernia  No significant change in size of the abscess  Persistent concern for developing enterocutaneous fistula  No evidence of bowel obstruction  · WBC 15 68, initial lactate 2 9, afebrile  · CRP 34 5  · Received 2 L normal saline in the ER and Zosyn   · Initial plan was to transfer to [facility], but surgeon at [facility] did not feel it to be an urgent procedure    The patient is scheduled for outpatient visit on Friday, 12/06/2019  · Continue IV cefepime and IV metronidazole  · Continue NSS IV fluids at 75 ml/hr    Hernia of abdominal wall  Assessment & Plan  · Reducible epigastric hernia, and non reducible right lower abdominal hernia   · CT A/P:  Complex ventral abdominal hernia containing loops of small bowel and colon  · Outpatient follow-up with General Surgery at [facility] on Friday, 12/06/2019    Bacteriuria  Assessment & Plan  · Check a urine culture    Anemia  Assessment & Plan  · Check an iron panel, vitamin B12 level, and folate level  · Follow the CBC  · Transfuse for a hemoglobin less than 7 g/dl    Type 2 diabetes mellitus with hyperglycemia, without long-term current use of insulin (HCC)  Assessment & Plan  Lab Results   Component Value Date    HGBA1C 7 7 (H) 2019       Recent Labs     19  0122 19  0733 19  1213 19  1601   POCGLU 162* 148* 160* 169*       Blood Sugar Average: Last 72 hrs:  (P) 159 75     · Initiate the hypoglycemia protocol  · Fingerstick blood sugar checks ACHS with sliding scale coverage  · Hold home metformin and exenatide during the hospitalization    Essential hypertension  Assessment & Plan  · Continue PO toprol XL  · Follow the blood pressure trend      VTE Pharmacologic Prophylaxis:   Pharmacologic: Enoxaparin (Lovenox) 40 mg SQ every 12 hours (Dose based on her BMI)  Mechanical VTE Prophylaxis in Place: Yes    Patient Centered Rounds: I have performed bedside rounds with nursing staff today  Time Spent for Care: 30 minutes  More than 50% of total time spent on counseling and coordination of care as described above  Current Length of Stay: 1 day(s)    Current Patient Status: Inpatient   Certification Statement: The patient continues to require inpatient hospitalization for IV antibiotic treatment  Code Status: Level 1 - Full Code      Subjective: The patient was seen and examined  The patient is doing better  No chest pain  No shortness of breath  No abdominal pain  No nausea or vomiting  Objective:     Vitals:   Temp (24hrs), Av 1 °F (36 7 °C), Min:97 9 °F (36 6 °C), Max:98 5 °F (36 9 °C)    Temp:  [97 9 °F (36 6 °C)-98 5 °F (36 9 °C)] 98 °F (36 7 °C)  HR:  [73-94] 83  Resp:  [18-20] 18  BP: (112-152)/(59-85) 151/85  SpO2:  [94 %-99 %] 99 %  Body mass index is 49 03 kg/m²  Input and Output Summary (last 24 hours):        Intake/Output Summary (Last 24 hours) at 2019 1641  Last data filed at 2019 0301  Gross per 24 hour   Intake 1000 ml   Output 400 ml   Net 600 ml       Physical Exam:     Physical Exam  General:  NAD, awake, alert, follows commands  HEENT:  NC/AT, mucous membranes moist  Neck:  Supple, No JVP elevation  CV:  + S1, + S2, RRR  Pulm:  Lung fields are CTA bilaterally  Abd:  Soft, Non-tender, Non-distended, Anterior abdominal wound with wound dressing intact  Ext:  No clubbing/cyanosis/edema  Skin:  No rashes      Additional Data:    Labs:    Results from last 7 days   Lab Units 12/04/19  0517   WBC Thousand/uL 12 00*   HEMOGLOBIN g/dL 9 9*   HEMATOCRIT % 30 9*   PLATELETS Thousands/uL 350   NEUTROS PCT % 71   LYMPHS PCT % 20   MONOS PCT % 8   EOS PCT % 1     Results from last 7 days   Lab Units 12/04/19  0517 12/03/19  1404   SODIUM mmol/L 138 137   POTASSIUM mmol/L 3 6 4 5   CHLORIDE mmol/L 103 99*   CO2 mmol/L 24 25   BUN mg/dL 15 24   CREATININE mg/dL 0 68 0 97   ANION GAP mmol/L 11 13   CALCIUM mg/dL 8 1* 9 2   ALBUMIN g/dL  --  3 6   TOTAL BILIRUBIN mg/dL  --  0 33   ALK PHOS U/L  --  90   ALT U/L  --  18   AST U/L  --  14   GLUCOSE RANDOM mg/dL 134 161*         Results from last 7 days   Lab Units 12/04/19  1601 12/04/19  1213 12/04/19  0733 12/04/19  0122   POC GLUCOSE mg/dl 169* 160* 148* 162*         Results from last 7 days   Lab Units 12/04/19  0305 12/03/19  2331 12/03/19  1403   LACTIC ACID mmol/L 2 0 2 8* 2 9*           * I Have Reviewed All Lab Data Listed Above  * Additional Pertinent Lab Tests Reviewed:  [facility]      Recent Cultures (last 7 days):     Results from last 7 days   Lab Units 12/03/19  1406   URINE CULTURE  Culture too young- will reincubate       Last 24 Hours Medication List:     Current Facility-Administered Medications:  aspirin 81 mg Oral Daily YAMILE Long    cefepime 2,000 mg Intravenous Q12H YAMILE Long Last Rate: Stopped (12/04/19 1407)   dextrose 25 mL Intravenous PRN Diana Majano DO    enoxaparin 40 mg Subcutaneous Q12H [facility] Pj Phan DO    insulin lispro 2-12 Units Subcutaneous TID AC YAMILE Long    insulin lispro 2-12 Units Subcutaneous HS YAMILE Long    metoprolol succinate 100 mg Oral Daily Richard Brewer, DO    metroNIDAZOLE 500 mg Intravenous Q8H Juan Pablo Ly, CRNP Last Rate: Stopped (12/04/19 1217)   nystatin  Topical BID Juan Pablo Ly, CRNP    ondansetron 4 mg Intravenous Q6H PRN Juan Pablo Ly, CRNP    PARoxetine 10 mg Oral Daily Juan Pablo Ly, CRNP    pravastatin 40 mg Oral Daily With Sara Combsast, CRNP    sodium chloride 75 mL/hr Intravenous Continuous Richard Brewer DO         Today, Patient Was Seen By: Richard Brewer DO    ** Please Note: Dictation voice to text software may have been used in the creation of this document   **

## 2019-12-04 NOTE — PLAN OF CARE
Problem: PAIN - ADULT  Goal: Verbalizes/displays adequate comfort level or baseline comfort level  Description  Interventions:  - Encourage patient to monitor pain and request assistance  - Assess pain using appropriate pain scale  - Administer analgesics based on type and severity of pain and evaluate response  - Implement non-pharmacological measures as appropriate and evaluate response  - Consider cultural and social influences on pain and pain management  - Notify physician/advanced practitioner if interventions unsuccessful or patient reports new pain  Outcome: Progressing     Problem: INFECTION - ADULT  Goal: Absence or prevention of progression during hospitalization  Description  INTERVENTIONS:  - Assess and monitor for signs and symptoms of infection  - Monitor lab/diagnostic results  - Monitor all insertion sites, i e  indwelling lines, tubes, and drains  - Monitor endotracheal if appropriate and nasal secretions for changes in amount and color  - [place] appropriate cooling/warming therapies per order  - Administer medications as ordered  - Instruct and encourage patient and family to use good hand hygiene technique  - Identify and instruct in appropriate isolation precautions for identified infection/condition  Outcome: Progressing  Goal: Absence of fever/infection during neutropenic period  Description  INTERVENTIONS:  - Monitor WBC    Outcome: Progressing     Problem: SKIN/TISSUE INTEGRITY - ADULT  Goal: Incision(s), wounds(s) or drain site(s) healing without S/S of infection  Description  INTERVENTIONS  - Assess and document risk factors for skin impairment   - Assess and document dressing, incision, wound bed, drain sites and surrounding tissue  - Consider nutrition services referral as needed  - Provide patient/ family education   Outcome: Progressing     Problem: METABOLIC, FLUID AND ELECTROLYTES - ADULT  Goal: Glucose maintained within target range  Description  INTERVENTIONS:  - Monitor Blood Glucose as ordered  - Assess for signs and symptoms of hyperglycemia and hypoglycemia  - Administer ordered medications to maintain glucose within target range  - Assess nutritional intake and initiate nutrition service referral as needed  Outcome: Progressing

## 2019-12-05 VITALS
OXYGEN SATURATION: 96 % | HEART RATE: 82 BPM | WEIGHT: 293 LBS | SYSTOLIC BLOOD PRESSURE: 144 MMHG | HEIGHT: 67 IN | BODY MASS INDEX: 45.99 KG/M2 | RESPIRATION RATE: 18 BRPM | DIASTOLIC BLOOD PRESSURE: 80 MMHG | TEMPERATURE: 98.1 F

## 2019-12-05 LAB
ALBUMIN SERPL BCP-MCNC: 2.7 G/DL (ref 3.5–5)
ALP SERPL-CCNC: 68 U/L (ref 46–116)
ALT SERPL W P-5'-P-CCNC: 10 U/L (ref 12–78)
ANION GAP SERPL CALCULATED.3IONS-SCNC: 7 MMOL/L (ref 4–13)
AST SERPL W P-5'-P-CCNC: 11 U/L (ref 5–45)
BACTERIA UR CULT: ABNORMAL
BACTERIA UR CULT: ABNORMAL
BASOPHILS # BLD AUTO: 0.02 THOUSANDS/ΜL (ref 0–0.1)
BASOPHILS NFR BLD AUTO: 0 % (ref 0–1)
BILIRUB SERPL-MCNC: 0.32 MG/DL (ref 0.2–1)
BUN SERPL-MCNC: 10 MG/DL (ref 5–25)
CALCIUM SERPL-MCNC: 8.5 MG/DL (ref 8.3–10.1)
CHLORIDE SERPL-SCNC: 104 MMOL/L (ref 100–108)
CK SERPL-CCNC: 59 U/L (ref 26–192)
CO2 SERPL-SCNC: 25 MMOL/L (ref 21–32)
CREAT SERPL-MCNC: 0.58 MG/DL (ref 0.6–1.3)
EOSINOPHIL # BLD AUTO: 0.13 THOUSAND/ΜL (ref 0–0.61)
EOSINOPHIL NFR BLD AUTO: 1 % (ref 0–6)
ERYTHROCYTE [DISTWIDTH] IN BLOOD BY AUTOMATED COUNT: 13.8 % (ref 11.6–15.1)
FERRITIN SERPL-MCNC: 13 NG/ML (ref 8–388)
FOLATE SERPL-MCNC: 17.9 NG/ML (ref 3.1–17.5)
GFR SERPL CREATININE-BSD FRML MDRD: 101 ML/MIN/1.73SQ M
GLUCOSE SERPL-MCNC: 131 MG/DL (ref 65–140)
GLUCOSE SERPL-MCNC: 156 MG/DL (ref 65–140)
GLUCOSE SERPL-MCNC: 214 MG/DL (ref 65–140)
HCT VFR BLD AUTO: 31.4 % (ref 34.8–46.1)
HGB BLD-MCNC: 10 G/DL (ref 11.5–15.4)
IMM GRANULOCYTES # BLD AUTO: 0.03 THOUSAND/UL (ref 0–0.2)
IMM GRANULOCYTES NFR BLD AUTO: 0 % (ref 0–2)
IRON SATN MFR SERPL: 12 %
IRON SERPL-MCNC: 39 UG/DL (ref 50–170)
LACTATE SERPL-SCNC: 1.2 MMOL/L (ref 0.5–2)
LYMPHOCYTES # BLD AUTO: 2.07 THOUSANDS/ΜL (ref 0.6–4.47)
LYMPHOCYTES NFR BLD AUTO: 22 % (ref 14–44)
MAGNESIUM SERPL-MCNC: 1.7 MG/DL (ref 1.6–2.6)
MCH RBC QN AUTO: 25.9 PG (ref 26.8–34.3)
MCHC RBC AUTO-ENTMCNC: 31.8 G/DL (ref 31.4–37.4)
MCV RBC AUTO: 81 FL (ref 82–98)
MONOCYTES # BLD AUTO: 0.71 THOUSAND/ΜL (ref 0.17–1.22)
MONOCYTES NFR BLD AUTO: 8 % (ref 4–12)
NEUTROPHILS # BLD AUTO: 6.55 THOUSANDS/ΜL (ref 1.85–7.62)
NEUTS SEG NFR BLD AUTO: 69 % (ref 43–75)
NRBC BLD AUTO-RTO: 0 /100 WBCS
PHOSPHATE SERPL-MCNC: 3.4 MG/DL (ref 2.7–4.5)
PLATELET # BLD AUTO: 345 THOUSANDS/UL (ref 149–390)
PMV BLD AUTO: 8.4 FL (ref 8.9–12.7)
POTASSIUM SERPL-SCNC: 3.8 MMOL/L (ref 3.5–5.3)
PROCALCITONIN SERPL-MCNC: <0.05 NG/ML
PROT SERPL-MCNC: 6.3 G/DL (ref 6.4–8.2)
RBC # BLD AUTO: 3.86 MILLION/UL (ref 3.81–5.12)
SODIUM SERPL-SCNC: 136 MMOL/L (ref 136–145)
TIBC SERPL-MCNC: 336 UG/DL (ref 250–450)
VIT B12 SERPL-MCNC: 282 PG/ML (ref 100–900)
WBC # BLD AUTO: 9.51 THOUSAND/UL (ref 4.31–10.16)

## 2019-12-05 PROCEDURE — 84145 PROCALCITONIN (PCT): CPT | Performed by: INTERNAL MEDICINE

## 2019-12-05 PROCEDURE — 82746 ASSAY OF FOLIC ACID SERUM: CPT | Performed by: INTERNAL MEDICINE

## 2019-12-05 PROCEDURE — 83540 ASSAY OF IRON: CPT | Performed by: INTERNAL MEDICINE

## 2019-12-05 PROCEDURE — 82948 REAGENT STRIP/BLOOD GLUCOSE: CPT

## 2019-12-05 PROCEDURE — 99239 HOSP IP/OBS DSCHRG MGMT >30: CPT | Performed by: INTERNAL MEDICINE

## 2019-12-05 PROCEDURE — 83735 ASSAY OF MAGNESIUM: CPT | Performed by: INTERNAL MEDICINE

## 2019-12-05 PROCEDURE — 83605 ASSAY OF LACTIC ACID: CPT | Performed by: INTERNAL MEDICINE

## 2019-12-05 PROCEDURE — 83550 IRON BINDING TEST: CPT | Performed by: INTERNAL MEDICINE

## 2019-12-05 PROCEDURE — 82607 VITAMIN B-12: CPT | Performed by: INTERNAL MEDICINE

## 2019-12-05 PROCEDURE — 80053 COMPREHEN METABOLIC PANEL: CPT | Performed by: INTERNAL MEDICINE

## 2019-12-05 PROCEDURE — 84100 ASSAY OF PHOSPHORUS: CPT | Performed by: INTERNAL MEDICINE

## 2019-12-05 PROCEDURE — 82550 ASSAY OF CK (CPK): CPT | Performed by: INTERNAL MEDICINE

## 2019-12-05 PROCEDURE — 85025 COMPLETE CBC W/AUTO DIFF WBC: CPT | Performed by: INTERNAL MEDICINE

## 2019-12-05 PROCEDURE — 82728 ASSAY OF FERRITIN: CPT | Performed by: INTERNAL MEDICINE

## 2019-12-05 RX ORDER — FERROUS SULFATE TAB EC 324 MG (65 MG FE EQUIVALENT) 324 (65 FE) MG
324 TABLET DELAYED RESPONSE ORAL
Qty: 30 TABLET | Refills: 0 | Status: SHIPPED | OUTPATIENT
Start: 2019-12-05

## 2019-12-05 RX ORDER — POTASSIUM CHLORIDE 20 MEQ/1
20 TABLET, EXTENDED RELEASE ORAL ONCE
Status: COMPLETED | OUTPATIENT
Start: 2019-12-05 | End: 2019-12-05

## 2019-12-05 RX ADMIN — INSULIN LISPRO 2 UNITS: 100 INJECTION, SOLUTION INTRAVENOUS; SUBCUTANEOUS at 08:33

## 2019-12-05 RX ADMIN — ENOXAPARIN SODIUM 40 MG: 40 INJECTION SUBCUTANEOUS at 10:00

## 2019-12-05 RX ADMIN — METOPROLOL SUCCINATE 100 MG: 100 TABLET, FILM COATED, EXTENDED RELEASE ORAL at 10:00

## 2019-12-05 RX ADMIN — Medication 400 MG: at 12:22

## 2019-12-05 RX ADMIN — NYSTATIN: 100000 POWDER TOPICAL at 10:03

## 2019-12-05 RX ADMIN — Medication 2000 MG: at 00:42

## 2019-12-05 RX ADMIN — Medication 2000 MG: at 12:22

## 2019-12-05 RX ADMIN — INSULIN LISPRO 4 UNITS: 100 INJECTION, SOLUTION INTRAVENOUS; SUBCUTANEOUS at 12:21

## 2019-12-05 RX ADMIN — ASPIRIN 81 MG: 81 TABLET, COATED ORAL at 10:00

## 2019-12-05 RX ADMIN — PAROXETINE 10 MG: 20 TABLET, FILM COATED ORAL at 10:00

## 2019-12-05 RX ADMIN — METRONIDAZOLE 500 MG: 500 INJECTION, SOLUTION INTRAVENOUS at 08:33

## 2019-12-05 RX ADMIN — METRONIDAZOLE 500 MG: 500 INJECTION, SOLUTION INTRAVENOUS at 00:42

## 2019-12-05 RX ADMIN — POTASSIUM CHLORIDE 20 MEQ: 1500 TABLET, EXTENDED RELEASE ORAL at 12:22

## 2019-12-05 NOTE — ASSESSMENT & PLAN NOTE
icrobiology Results (last 21 days)     Procedure Component Value - Date/Time   Urine culture [[id number]] (Abnormal)  Collected: 12/03/19 1406   Lab Status: Final result Specimen: Urine, Clean Catch Updated: 12/05/19 1502    Urine Culture 30,000-39,000 cfu/ml Candida glabrataAbnormal      <10,000 cfu/ml Candida albicansAbnormal    Susceptibility     Candida glabrata (1)     Antibiotic Interpretation Microscan Method Status    ZID Performed  Yes JOANN Final    Candida albicans (2)     Antibiotic Interpretation Microscan Method Status    ZID Performed  Yes JOANN Final     Condensed View           · No additional treatment is indicated at this time

## 2019-12-05 NOTE — SOCIAL WORK
CM met with pt to discuss role of CM and to discuss any needs pt may need prior to d/c  Pt is a 30 day readmission  Reviewed notes from previous admission with pt  Confirmed no changes  Pt did express some concerns that she had a negative experience with [facility]  CM directed pt to call back to hospital next time if there is anything we can do to assist with communication with the agency  Case was discussed during morning CM rounds and there were no identified CM needs at this time  Pt will be d/c today with a f/u already scheduled with her specialist at [facility]  CM to remain available until d/c for additional needs or d/c recommendations

## 2019-12-05 NOTE — PROGRESS NOTES
Progress Note - General Surgery   Santosh Meneses 61 y o  female MRN: [id number]  Unit/Bed#: -01 Encounter: [id number]    Assessment:  Persistent drainage from abdominal wall cutaneous abscess, patient was admitted here about a week ago for cellulitis, she was admitted during this stay for a 2 day history of nausea and vomiting  Patient has an epigastric hernia which is reducible and another right lower abdominal wall hernia which is not reducible  CT of the abdomen pelvis shows complex ventral abdominal wall hernia containing loops of small bowel and colon  Wound culture obtained on November 24th last week when she was an inpatient here grew Klebsiella pneumoniae  Plan:  No plan for surgical intervention at this time  The patient has a pre-existing outpatient follow-up with General surgery tomorrow, Friday December 6th, at the [facility] with Dr Oneida Santiago  Patient can be discharged home later today at the direction of the hospitalist provider  Continue cefepime and Flagyl, transition to p o  Antibiotics by the hospitalist team for continuation after discharge home  Diet as tolerated  Daily iodoform packing changed to abdominal wound  General surgery here will sign off at this point    Subjective/Objective   Chief Complaint:  Patient continues with drainage from the abdominal wound which is packed with iodoform  Subjective:  59-year-old white female was admitted with a 2 day history of vomiting  She has a known 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  She has a history of multiple abdominal surgeries and CT scan showed complex ventral hernia with small bowel and colon within the hernias  The patient has an appointment to be seen tomorrow by general surgery at [facility]  She wishes to proceed with maintaining that appointment there, she would need extensive abdominal wall hernia repair and probable takedown of enterocutaneous fistula      Patient is currently receiving IV cefepime and metronidazole  Scheduled Meds:  Current Facility-Administered Medications:  aspirin 81 mg Oral Daily DENNYS PersaudNP    cefepime 2,000 mg Intravenous Q12H Emile Browne, CRNP Last Rate: 2,000 mg (12/05/19 0042)   dextrose 25 mL Intravenous PRN Stephanie Martinez DO    enoxaparin 40 mg Subcutaneous Q12H [facility] Pj Emilie, DO    insulin lispro 2-12 Units Subcutaneous TID AC Emile Estrin, CRNP    insulin lispro 2-12 Units Subcutaneous HS Emile Estrin, CRNP    metoprolol succinate 100 mg Oral Daily Stephanie Martinez DO    metroNIDAZOLE 500 mg Intravenous Q8H Emile Martinezin, CRKATHLEEN Last Rate: 500 mg (12/05/19 0170)   nystatin  Topical BID Emile Estrin, CRNP    ondansetron 4 mg Intravenous Q6H PRN Emile Estrin, CRNP    PARoxetine 10 mg Oral Daily Emile Estrin, CRKATHLEEN    pravastatin 40 mg Oral Daily With YAMILE Landrum      Continuous Infusions:   PRN Meds: dextrose    ondansetron      Objective:     Blood pressure 144/80, pulse 82, temperature 98 1 °F (36 7 °C), resp  rate 18, height 5' 6" (1 676 m), weight (!) 138 kg (303 lb 12 7 oz), SpO2 96 %  ,Body mass index is 49 03 kg/m²  Intake/Output Summary (Last 24 hours) at 12/5/2019 1019  Last data filed at 12/5/2019 0858  Gross per 24 hour   Intake 1531 25 ml   Output --   Net 1531 25 ml       Invasive Devices     Peripheral Intravenous Line            Peripheral IV 12/04/19 Right Antecubital less than 1 day                Physical Exam:    Patient is awake alert  No acute distress  ENT clear and unremarkable  Heart regular rate and rhythm  Lungs clear auscultation  Back no CVA tenderness  Abdomen wide abdominal girth  Positive bowel sounds are heard  Midline abdominal scar  The patient has a large ventral hernia palpable, nontender  There is an incision on the midline above the umbilicus with scant amount of serosanguineous drainage noted on the iodoform packing    The area is not erythematous  Very minimal to touch without any rebound  No masses  She moves all 4 extremities well  No calf tenderness or peripheral edema  Ambulation not observed  No focal motor or sensory neurologic weakness is present  Mental status appropriate  Cranial nerves 2-12 appear symmetrical and intact  Lab, Imaging and other studies:  I have personally reviewed pertinent lab results    , CBC:   Lab Results   Component Value Date    WBC 9 51 12/05/2019    HGB 10 0 (L) 12/05/2019    HCT 31 4 (L) 12/05/2019    MCV 81 (L) 12/05/2019     12/05/2019    MCH 25 9 (L) 12/05/2019    MCHC 31 8 12/05/2019    RDW 13 8 12/05/2019    MPV 8 4 (L) 12/05/2019    NRBC 0 12/05/2019   , CMP:   Lab Results   Component Value Date    SODIUM 136 12/05/2019    K 3 8 12/05/2019     12/05/2019    CO2 25 12/05/2019    BUN 10 12/05/2019    CREATININE 0 58 (L) 12/05/2019    CALCIUM 8 5 12/05/2019    AST 11 12/05/2019    ALT 10 (L) 12/05/2019    ALKPHOS 68 12/05/2019    EGFR 101 12/05/2019     Wound culture and Gram stain   Order: [id number]   Status:  Final result   Visible to patient:  No (Inaccessible in MyChart)   Next appt:  None   Specimen Information: Abdominal; Wound        Wound Culture Few Colonies of Klebsiella pneumoniaeAbnormal               GRAM STAIN RESULT  Abnormal    2+ Polys      Rare Gram negative rods                      VTE Pharmacologic Prophylaxis: Enoxaparin (Lovenox)  VTE Mechanical Prophylaxis: sequential compression device     Arianna Schwarz PA-C

## 2019-12-05 NOTE — ASSESSMENT & PLAN NOTE
· Presented to urgent care for vomiting and sent to ER for evaluation  · Admitted at [facility] 11/21-26 for abdominal pain and nonsurgical drainage of abdominal wall fistulas  · Wound culture at that time showed Klebsiella  · Previously had an abdominal wall abscess with washout at [facility] [place] in June  · Plan was for surgery at [facility] 12/5 due to complex anatomy with multiple hernias and chronic wound  · CT A/P:  Persistent subcutaneous abscess with apparent cutaneous fistula within the complex ventral abdominal hernia  No significant change in size of the abscess  Persistent concern for developing enterocutaneous fistula  No evidence of bowel obstruction  · WBC 15 68, initial lactate 2 9, afebrile  · CRP 34 5  · Received 2 L normal saline in the ER and Zosyn   · Initial plan was to transfer to [facility], but the surgeon at [facility] did not feel it to be an urgent procedure    The patient is scheduled for outpatient visit on Friday, 12/06/2019  · The patient was seen in consultation by General Surgery  · The patient was treated with IV cefepime and IV metronidazole and NSS IV fluids at 75 ml/hr during the hospitalization  · The patient will follow-up with General Surgery at [facility] on Friday, 12/06/2019

## 2019-12-05 NOTE — ASSESSMENT & PLAN NOTE
· Reducible epigastric hernia, and non reducible right lower abdominal hernia   · CT A/P:  Complex ventral abdominal hernia containing loops of small bowel and colon  · Outpatient follow-up with General Surgery at [facility] on Friday, 12/06/2019

## 2019-12-05 NOTE — PLAN OF CARE
Problem: PAIN - ADULT  Goal: Verbalizes/displays adequate comfort level or baseline comfort level  Description  Interventions:  - Encourage patient to monitor pain and request assistance  - Assess pain using appropriate pain scale  - Administer analgesics based on type and severity of pain and evaluate response  - Implement non-pharmacological measures as appropriate and evaluate response  - Consider cultural and social influences on pain and pain management  - Notify physician/advanced practitioner if interventions unsuccessful or patient reports new pain  Outcome: Progressing     Problem: INFECTION - ADULT  Goal: Absence or prevention of progression during hospitalization  Description  INTERVENTIONS:  - Assess and monitor for signs and symptoms of infection  - Monitor lab/diagnostic results  - Monitor all insertion sites, i e  indwelling lines, tubes, and drains  - Monitor endotracheal if appropriate and nasal secretions for changes in amount and color  - [place] appropriate cooling/warming therapies per order  - Administer medications as ordered  - Instruct and encourage patient and family to use good hand hygiene technique  - Identify and instruct in appropriate isolation precautions for identified infection/condition  Outcome: Progressing  Goal: Absence of fever/infection during neutropenic period  Description  INTERVENTIONS:  - Monitor WBC    Outcome: Progressing     Problem: SKIN/TISSUE INTEGRITY - ADULT  Goal: Incision(s), wounds(s) or drain site(s) healing without S/S of infection  Description  INTERVENTIONS  - Assess and document risk factors for skin impairment   - Assess and document dressing, incision, wound bed, drain sites and surrounding tissue  - Consider nutrition services referral as needed  - Provide patient/ family education   Outcome: Progressing     Problem: METABOLIC, FLUID AND ELECTROLYTES - ADULT  Goal: Glucose maintained within target range  Description  INTERVENTIONS:  - Monitor Blood Glucose as ordered  - Assess for signs and symptoms of hyperglycemia and hypoglycemia  - Administer ordered medications to maintain glucose within target range  - Assess nutritional intake and initiate nutrition service referral as needed  Outcome: Progressing     Problem: Potential for Falls  Goal: Patient will remain free of falls  Description  INTERVENTIONS:  - Assess patient frequently for physical needs  -  Identify cognitive and physical deficits and behaviors that affect risk of falls    -  [employer] fall precautions as indicated by assessment   - Educate patient/family on patient safety including physical limitations  - Instruct patient to call for assistance with activity based on assessment  - Modify environment to reduce risk of injury  - Consider OT/PT consult to assist with strengthening/mobility  Outcome: Progressing

## 2019-12-05 NOTE — PLAN OF CARE
Problem: PAIN - ADULT  Goal: Verbalizes/displays adequate comfort level or baseline comfort level  Description  Interventions:  - Encourage patient to monitor pain and request assistance  - Assess pain using appropriate pain scale  - Administer analgesics based on type and severity of pain and evaluate response  - Implement non-pharmacological measures as appropriate and evaluate response  - Consider cultural and social influences on pain and pain management  - Notify physician/advanced practitioner if interventions unsuccessful or patient reports new pain  Outcome: Progressing     Problem: INFECTION - ADULT  Goal: Absence or prevention of progression during hospitalization  Description  INTERVENTIONS:  - Assess and monitor for signs and symptoms of infection  - Monitor lab/diagnostic results  - Monitor all insertion sites, i e  indwelling lines, tubes, and drains  - Monitor endotracheal if appropriate and nasal secretions for changes in amount and color  - [place] appropriate cooling/warming therapies per order  - Administer medications as ordered  - Instruct and encourage patient and family to use good hand hygiene technique  - Identify and instruct in appropriate isolation precautions for identified infection/condition  Outcome: Progressing  Goal: Absence of fever/infection during neutropenic period  Description  INTERVENTIONS:  - Monitor WBC    Outcome: Progressing     Problem: SKIN/TISSUE INTEGRITY - ADULT  Goal: Incision(s), wounds(s) or drain site(s) healing without S/S of infection  Description  INTERVENTIONS  - Assess and document risk factors for skin impairment   - Assess and document dressing, incision, wound bed, drain sites and surrounding tissue  - Consider nutrition services referral as needed  - Provide patient/ family education   Outcome: Progressing     Problem: METABOLIC, FLUID AND ELECTROLYTES - ADULT  Goal: Glucose maintained within target range  Description  INTERVENTIONS:  - Monitor Blood Glucose as ordered  - Assess for signs and symptoms of hyperglycemia and hypoglycemia  - Administer ordered medications to maintain glucose within target range  - Assess nutritional intake and initiate nutrition service referral as needed  Outcome: Progressing     Problem: Potential for Falls  Goal: Patient will remain free of falls  Description  INTERVENTIONS:  - Assess patient frequently for physical needs  -  Identify cognitive and physical deficits and behaviors that affect risk of falls    -  [place] fall precautions as indicated by assessment   - Educate patient/family on patient safety including physical limitations  - Instruct patient to call for assistance with activity based on assessment  - Modify environment to reduce risk of injury  - Consider OT/PT consult to assist with strengthening/mobility  Outcome: Progressing

## 2019-12-05 NOTE — ASSESSMENT & PLAN NOTE
Results for Roma Zavala (MRN [id number]) as of 12/5/2019 17:53   Ref   Range 12/5/2019 06:44   Iron Latest Ref Range: 50 - 170 ug/dL 39 (L)   Ferritin Latest Ref Range: 8 - 388 ng/mL 13   Iron Saturation Latest Units: % 12   TIBC Latest Ref Range: 250 - 450 ug/dL 336   Folate Latest Ref Range: 3 1 - 17 5 ng/mL 17 9 (H)   LACTIC ACID Latest Ref Range: 0 5 - 2 0 mmol/L 1 2   Vitamin B-12 Latest Ref Range: 100 - 900 pg/mL 282     · Initiate cyanocobalamin 250 micrograms PO Qdaily for a low-normal vitamin B12 level  · Initiate ferrous sulfate 324 mg PO Qdaily for iron-deficiency  · Follow the CBC after discharge  · Transfuse for a hemoglobin less than 7 g/dl

## 2019-12-05 NOTE — DISCHARGE SUMMARY
Discharge- Brigitte Perez 1960, 61 y o  female MRN: [id number]    Unit/Bed#: -01 Encounter: [id number]    Primary Care Provider: Natasha Barrera MD   Date and time admitted to hospital: 12/3/2019 12:45 PM        * Cutaneous abscess of abdominal wall  Assessment & Plan  · Presented to urgent care for vomiting and sent to ER for evaluation  · Admitted at [facility] 11/21-26 for abdominal pain and nonsurgical drainage of abdominal wall fistulas  · Wound culture at that time showed Klebsiella  · Previously had an abdominal wall abscess with washout at [facility] in [place] in June  · Plan was for surgery at [facility] 12/5 due to complex anatomy with multiple hernias and chronic wound  · CT A/P:  Persistent subcutaneous abscess with apparent cutaneous fistula within the complex ventral abdominal hernia  No significant change in size of the abscess  Persistent concern for developing enterocutaneous fistula  No evidence of bowel obstruction  · WBC 15 68, initial lactate 2 9, afebrile  · CRP 34 5  · Received 2 L normal saline in the ER and Zosyn   · Initial plan was to transfer to [facility], but the surgeon at [facility] did not feel it to be an urgent procedure    The patient is scheduled for outpatient visit on Friday, 12/06/2019  · The patient was seen in consultation by General Surgery  · The patient was treated with IV cefepime and IV metronidazole and NSS IV fluids at 75 ml/hr during the hospitalization  · The patient will follow-up with General Surgery at [facility] on Friday, 12/06/2019    Hernia of abdominal wall  Assessment & Plan  · Reducible epigastric hernia, and non reducible right lower abdominal hernia   · CT A/P:  Complex ventral abdominal hernia containing loops of small bowel and colon  · Outpatient follow-up with General Surgery at [facility] on Friday, 12/06/2019    Bacteriuria  Assessment & Plan  icrobiology Results (last 21 days)     Procedure Component Value - Date/Time   Urine culture [[id number]] (Abnormal)  Collected: 12/03/19 1406   Lab Status: Final result Specimen: Urine, Clean Catch Updated: 12/05/19 1502    Urine Culture 30,000-39,000 cfu/ml Candida glabrataAbnormal      <10,000 cfu/ml Candida albicansAbnormal    Susceptibility     Candida glabrata (1)     Antibiotic Interpretation Microscan Method Status    ZID Performed  Yes JOANN Final    Candida albicans (2)     Antibiotic Interpretation Microscan Method Status    ZID Performed  Yes JOANN Final     Condensed View           · No additional treatment is indicated at this time    Anemia  Assessment & Plan  Results for Briana Mcbride (MRN [id number]) as of 12/5/2019 17:53   Ref   Range 12/5/2019 06:44   Iron Latest Ref Range: 50 - 170 ug/dL 39 (L)   Ferritin Latest Ref Range: 8 - 388 ng/mL 13   Iron Saturation Latest Units: % 12   TIBC Latest Ref Range: 250 - 450 ug/dL 336   Folate Latest Ref Range: 3 1 - 17 5 ng/mL 17 9 (H)   LACTIC ACID Latest Ref Range: 0 5 - 2 0 mmol/L 1 2   Vitamin B-12 Latest Ref Range: 100 - 900 pg/mL 282     · Initiate cyanocobalamin 250 micrograms PO Qdaily for a low-normal vitamin B12 level  · Initiate ferrous sulfate 324 mg PO Qdaily for iron-deficiency  · Follow the CBC after discharge  · Transfuse for a hemoglobin less than 7 g/dl    Type 2 diabetes mellitus with hyperglycemia, without long-term current use of insulin [facility])  Assessment & Plan  Lab Results   Component Value Date    HGBA1C 7 7 (H) 11/21/2019       Recent Labs     12/04/19  1601 12/04/19  2115 12/05/19  0748 12/05/19  1045   POCGLU 169* 146* 156* 214*       Blood Sugar Average: Last 72 hrs:  (P) 165     · Continue to hold metformin in the setting of acute illness  · Resume exenatide upon discharge  · The patient should be on an ACE-Inhibitor for renal protection in the setting of type 2 diabetes mellitus  · The patient's PCP will need to start the ACE-Inhibitor after she undergoes the surgical procedure of her abdominal wall at [facility]  Outpatient follow-up with PCP in regards to this matter      Essential hypertension  Assessment & Plan  · Continue PO toprol XL  · The patient should be on an ACE-Inhibitor for renal protection in the setting of type 2 diabetes mellitus  · The patient's PCP will need to start the ACE-Inhibitor after she undergoes the surgical procedure of her abdominal wall at [facility]  Outpatient follow-up with PCP in regards to this matter        Discharging Physician / Practitioner: Ernst Solomon DO  PCP: Ryanne Decker MD  Admission Date:   Admission Orders (From admission, onward)     Ordered        12/03/19 2015  Inpatient Admission  Once                   Discharge Date: 12/05/19      Consultations During Hospital Stay:  · General Surgery    Procedures Performed:   · None    Significant Findings / Test Results:   Ct Abdomen Pelvis With Contrast    Result Date: 12/3/2019  Impression: Persistent subcutaneous abscess with apparent cutaneous fistula within the complex ventral abdominal hernia  No significant change in size of the abscess  Persistent concern for developing enterocutaneous fistula  No evidence of bowel obstruction  Workstation performed: [id number]     Microbiology Results (last 21 days)     Procedure Component Value - Date/Time   Urine culture [[id number]] (Abnormal)  Collected: 12/03/19 1406   Lab Status: Final result Specimen: Urine, Clean Catch Updated: 12/05/19 1502    Urine Culture 30,000-39,000 cfu/ml Candida glabrataAbnormal      <10,000 cfu/ml Candida albicansAbnormal    Susceptibility     Candida glabrata (1)     Antibiotic Interpretation Microscan Method Status   ZID Performed  Yes JOANN Final   Candida albicans (2)     Antibiotic Interpretation Microscan Method Status   ZID Performed  Yes JOANN Final    Condensed View             Incidental Findings:   · None     Test Results Pending at Discharge (will require follow up):    · None Outpatient Tests Requested:  · CBC with diff , CMP, Mag, Phos in 5-7 days with PCP    Complications:  None    Reason for Admission:  Subcutaneous abscess of abdominal wall    Hospital Course:     Faye Hassan is a 61 y o  female patient who originally presented to the hospital on 12/3/2019 due to nausea and vomiting  Please see above list of diagnoses and related plan for additional information  Condition at Discharge: good     Discharge Day Visit / Exam:     Subjective: The patient was seen and examined  The patient is doing better  No chest pain  No shortness of breath  No abdominal pain  No nausea or vomiting  Vitals: Blood Pressure: 144/80 (12/05/19 0751)  Pulse: 82 (12/05/19 0751)  Temperature: 98 1 °F (36 7 °C) (12/05/19 0751)  Temp Source: Oral (12/04/19 1442)  Respirations: 18 (12/05/19 0751)  Height: 5' 7" (170 2 cm) (12/05/19 1116)  Weight - Scale: (!) 138 kg (303 lb 12 7 oz) (12/04/19 1500)  SpO2: 96 % (12/05/19 0751)  Exam:   Physical Exam  General:  NAD, awake, alert, follows commands  HEENT:  NC/AT, mucous membranes moist  Neck:  Supple, No JVP elevation  CV:  + S1, + S2, RRR  Pulm:  Lung fields are CTA bilaterally  Abd:  Soft, Non-tender, Non-distended, Wound dressing on the abdominal wall is intact  Ext:  No clubbing/cyanosis/edema  Skin:  No rashes    Discharge instructions/Information to patient and family:  See after visit summary for information provided to patient and family  Provisions for Follow-Up Care:  See after visit summary for information related to follow-up care and any pertinent home health orders  Disposition:     Home    Planned Readmission:  No     Discharge Statement:  I spent 35 minutes discharging the patient  This time was spent on the day of discharge  I had direct contact with the patient on the day of discharge   Greater than 50% of the total time was spent examining patient, answering all patient questions, arranging and discussing plan of care with patient as well as directly providing post-discharge instructions  Additional time then spent on discharge activities  Discharge Medications:  See after visit summary for reconciled discharge medications provided to patient and family        ** Please Note: This note has been constructed using a voice recognition system **

## 2019-12-05 NOTE — ASSESSMENT & PLAN NOTE
Lab Results   Component Value Date    HGBA1C 7 7 (H) 11/21/2019       Recent Labs     12/04/19  1601 12/04/19  2115 12/05/19  0748 12/05/19  1045   POCGLU 169* 146* 156* 214*       Blood Sugar Average: Last 72 hrs:  (P) 165     · Continue to hold metformin in the setting of acute illness  · Resume exenatide upon discharge  · The patient should be on an ACE-Inhibitor for renal protection in the setting of type 2 diabetes mellitus  · The patient's PCP will need to start the ACE-Inhibitor after she undergoes the surgical procedure of her abdominal wall at [facility]  Outpatient follow-up with PCP in regards to this matter

## 2019-12-05 NOTE — ASSESSMENT & PLAN NOTE
· Continue PO toprol XL  · The patient should be on an ACE-Inhibitor for renal protection in the setting of type 2 diabetes mellitus  · The patient's PCP will need to start the ACE-Inhibitor after she undergoes the surgical procedure of her abdominal wall at [facility]  Outpatient follow-up with PCP in regards to this matter

## 2019-12-05 NOTE — NURSING NOTE
Discharge instructions reviewed with patient  Patient verbalized understanding of same  IV site removed  Patient agrees to follow up with appointment at [facility] and follow up with family doctor within 1 week

## 2019-12-06 NOTE — UTILIZATION REVIEW
Notification of Discharge  This is a Notification of Discharge from our facility [address]  Please be advised that this patient has been discharge from our facility  Below you will find the admission and discharge date and time including the patients disposition  PRESENTATION DATE: 12/3/2019 12:45 PM  OBS ADMISSION DATE:   IP ADMISSION DATE: 12/3/19 2015   DISCHARGE DATE: 12/5/2019  3:25 PM  DISPOSITION: Home/Self Care Home/Self Care   Admission Orders listed below:  Admission Orders (From admission, onward)     Ordered        12/03/19 2015  Inpatient Admission  Once                   Please contact the UR Department if additional information is required to close this patient's authorization/case  [address] Utilization Review Department  Main: [phone] x carefully listen to the prompts  All voicemails are confidential   [email or web address]  Send all requests for admission clinical reviews, approved or denied determinations and any other requests to dedicated fax number below belonging to the campus where the patient is receiving treatment   List of dedicated fax numbers:  [address] DENIALS (Administrative/Medical Necessity) [phone]   [address] (Maternity/NICU/Pediatrics) [phone]   [facility] [phone]   Sai Martinez [phone]   Haider Campbell [phone]   Humberto Robertson [facility] [phone]   Sonya Norman [phone]   [facility]  [phone]   Mayda Mi [address] [phone]

## 2020-02-14 ENCOUNTER — HOSPITAL ENCOUNTER (EMERGENCY)
Facility: HOSPITAL | Age: 60
Discharge: HOME | End: 2020-02-14
Attending: EMERGENCY MEDICINE
Payer: COMMERCIAL

## 2020-02-14 ENCOUNTER — APPOINTMENT (EMERGENCY)
Dept: RADIOLOGY | Facility: HOSPITAL | Age: 60
End: 2020-02-14
Payer: COMMERCIAL

## 2020-02-14 VITALS
OXYGEN SATURATION: 98 % | BODY MASS INDEX: 49.21 KG/M2 | WEIGHT: 293 LBS | HEART RATE: 70 BPM | DIASTOLIC BLOOD PRESSURE: 86 MMHG | TEMPERATURE: 98.1 F | RESPIRATION RATE: 20 BRPM | SYSTOLIC BLOOD PRESSURE: 162 MMHG

## 2020-02-14 DIAGNOSIS — M79.604 RIGHT LEG PAIN: Primary | ICD-10-CM

## 2020-02-14 PROCEDURE — 99283 EMERGENCY DEPT VISIT LOW MDM: CPT | Performed by: EMERGENCY MEDICINE

## 2020-02-14 PROCEDURE — 96372 THER/PROPH/DIAG INJ SC/IM: CPT

## 2020-02-14 PROCEDURE — 73590 X-RAY EXAM OF LOWER LEG: CPT

## 2020-02-14 PROCEDURE — 99284 EMERGENCY DEPT VISIT MOD MDM: CPT

## 2020-02-14 RX ORDER — IBUPROFEN 600 MG/1
600 TABLET ORAL ONCE
Status: COMPLETED | OUTPATIENT
Start: 2020-02-14 | End: 2020-02-14

## 2020-02-14 RX ADMIN — ENOXAPARIN SODIUM: 150 INJECTION SUBCUTANEOUS at 19:26

## 2020-02-14 RX ADMIN — IBUPROFEN 600 MG: 600 TABLET ORAL at 19:09

## 2020-02-14 NOTE — ED PROVIDER NOTES
History  Chief Complaint   Patient presents with    Leg Pain     Patient reports leg pain that started 2 days ago  Patient post fall from 1 5 weeks ago  States she took motrin with relief this morning  Was sent at urgent care and referred here due to "thinking it's a blood clot"     Patient is a 15-year-old female presents the emergency department complaining of right posterior and lateral lower leg pain primarily in the right posterior calf radiating down into the ankle started about a week and a half ago after she fell off a chair no other injury at the time no anticoagulants  Patient reports that she thought the pain would get better but she has had worsening pain in her calf since  No history of prior blood clots no chest pain or shortness of breath no fevers or chills no redness mild swelling in the leg is noted  History provided by:  Patient  Leg Pain   Location:  Leg  Time since incident:  2 weeks  Leg location:  R leg  Pain details:     Quality:  Aching and cramping    Severity:  Mild    Onset quality:  Gradual    Duration:  2 weeks    Timing:  Constant    Progression:  Worsening  Chronicity:  New  Prior injury to area:  No  Associated symptoms: no fatigue and no fever        Prior to Admission Medications   Prescriptions Last Dose Informant Patient Reported? Taking?    Exenatide ER (BYDUREON) 2 MG PEN   Yes No   Sig: Inject 2 mg under the skin once a week   OZEMPIC, 0 25 OR 0 5 MG/DOSE, 2 MG/1 5ML SOPN   Yes No   PARoxetine (PAXIL) 10 mg tablet   Yes No   Sig: Take 10 mg by mouth daily   aspirin (ECOTRIN LOW STRENGTH) 81 mg EC tablet   Yes No   Sig: Take 81 mg by mouth daily   cyanocobalamin (VITAMIN B-12) 250 MCG tablet   No No   Sig: Take 1 tablet (250 mcg total) by mouth daily   ferrous sulfate 324 (65 Fe) mg   No No   Sig: Take 1 tablet (324 mg total) by mouth daily before breakfast   metoprolol succinate (TOPROL-XL) 100 mg 24 hr tablet   Yes No   Sig: Take 100 mg by mouth daily   simvastatin (ZOCOR) 20 mg tablet   Yes No   Sig: Take 20 mg by mouth daily at bedtime      Facility-Administered Medications: None       Past Medical History:   Diagnosis Date    Depression     Diabetes mellitus ([address] )     Fistula     High cholesterol     Hypertension        Past Surgical History:   Procedure Laterality Date    BOWEL RESECTION      CHOLECYSTECTOMY LAPAROSCOPIC      HERNIA REPAIR         Family History   Problem Relation Age of Onset    Heart disease Mother      I have reviewed and agree with the history as documented  Social History     Tobacco Use    Smoking status: Never Smoker    Smokeless tobacco: Never Used   Substance Use Topics    Alcohol use: Yes     Comment: rarely    Drug use: Never       Review of Systems   Constitutional: Negative for activity change, appetite change, chills, fatigue and fever  HENT: Negative for congestion, ear pain, rhinorrhea and sore throat  Eyes: Negative for discharge, redness and visual disturbance  Respiratory: Negative for cough, chest tightness, shortness of breath and wheezing  Cardiovascular: Negative for chest pain and palpitations  Gastrointestinal: Negative for abdominal pain, constipation, diarrhea, nausea and vomiting  Endocrine: Negative for polydipsia and polyuria  Genitourinary: Negative for difficulty urinating, dysuria, frequency, hematuria and urgency  Musculoskeletal: Negative for arthralgias and myalgias  Right leg pain   Skin: Negative for color change, pallor and rash  Neurological: Negative for dizziness, weakness, light-headedness, numbness and headaches  Hematological: Negative for adenopathy  Does not bruise/bleed easily  All other systems reviewed and are negative  Physical Exam  Physical Exam   Constitutional: She is oriented to person, place, and time  She appears well-developed and well-nourished  HENT:   Nose: Nose normal    Mouth/Throat: Oropharynx is clear and moist  No oropharyngeal exudate  Eyes: Pupils are equal, round, and reactive to light  Conjunctivae and EOM are normal  No scleral icterus  Neck: Normal range of motion  Neck supple  No JVD present  No tracheal deviation present  Cardiovascular: Normal rate, regular rhythm and normal heart sounds  No murmur heard  Pulmonary/Chest: Effort normal and breath sounds normal  No respiratory distress  She has no wheezes  She has no rales  Abdominal: Soft  Bowel sounds are normal  There is no tenderness  There is no guarding  Musculoskeletal: Normal range of motion  She exhibits no edema  Right lower leg: She exhibits tenderness and swelling  She exhibits no deformity  Right lower extremity neurovascularly intact distal with normal cap refill motor and sensation normal and normal dorsalis pedis and posterior tibial pulses  Mild edema and tenderness primarily in the posterior lateral calf  Neurological: She is alert and oriented to person, place, and time  No cranial nerve deficit or sensory deficit  She exhibits normal muscle tone  5/5 motor, nl sens   Skin: Skin is warm and dry  Psychiatric: She has a normal mood and affect  Her behavior is normal    Nursing note and vitals reviewed        Vital Signs  ED Triage Vitals [02/14/20 1850]   Temperature Pulse Respirations Blood Pressure SpO2   98 5 °F (36 9 °C) 86 18 (!) 184/86 98 %      Temp Source Heart Rate Source Patient Position - Orthostatic VS BP Location FiO2 (%)   Oral Monitor Sitting Right arm --      Pain Score       8           Vitals:    02/14/20 1850   BP: (!) 184/86   Pulse: 86   Patient Position - Orthostatic VS: Sitting         Visual Acuity      ED Medications  Medications   ibuprofen (MOTRIN) tablet 600 mg (600 mg Oral Given 2/14/20 1909)   enoxaparin (LOVENOX) subcutaneous injection 135 mg ( Subcutaneous Given 2/14/20 1926)       Diagnostic Studies  Results Reviewed     None                 XR tibia fibula 2 views RIGHT   Final Result by Ciarra Koroma MD (02/14 1945)      No acute osseous abnormality  Workstation performed: [id number]         VAS lower limb venous duplex study, unilateral/limited    (Results Pending)              Procedures  Procedures         ED Course                               MDM  Number of Diagnoses or Management Options  Right leg pain: new and requires workup  Diagnosis management comments: Patient is afebrile nontoxic well-appearing in the emergency department neurovascularly intact distal to site of pain  No evidence of cellulitis on examination possible sprain or strain of the calf given history of fall week and half ago however there is no acute fracture or dislocation noted on x-ray and concern for blood clot is present I have treated with subcu Lovenox in the emergency department tonight and provided with a outpatient prescription for venous duplex to be for performed in hospital tomorrow with results sent to PCP for follow-up advised rest and said supportive care ice and elevate for now and follow up promptly with PCP for further evaluation and treatment and obtain test results return precautions and anticipatory guidance discussed           Amount and/or Complexity of Data Reviewed  Tests in the radiology section of CPT®: ordered and reviewed  Independent visualization of images, tracings, or specimens: yes    Risk of Complications, Morbidity, and/or Mortality  Presenting problems: low  Diagnostic procedures: low  Management options: low    Patient Progress  Patient progress: stable        Disposition  Final diagnoses:   Right leg pain - Possible DVT     Time reflects when diagnosis was documented in both MDM as applicable and the Disposition within this note     Time User Action Codes Description Comment    2/14/2020  7:40 PM Karen Cox Add [M76 311] Right leg pain     2/14/2020  7:40 PM Karen Cox Modify [D37 436] Right leg pain Possible DVT      ED Disposition     ED Disposition Condition Date/Time Comment Discharge Stable Fri Feb 14, 2020  7:40 PM Mariana Wright discharge to home/self care  Follow-up Information     Follow up With Specialties Details Why Contact Info    Ryanne Decker MD [facility] Schedule an appointment as soon as possible for a visit   [address] [place] [address] [address]  [phone]            Patient's Medications   Discharge Prescriptions    No medications on file     Outpatient Discharge Orders   VAS lower limb venous duplex study, unilateral/limited   Standing Status: Future Standing Exp   Date: 02/14/24       PDMP Review     None          ED Provider  Electronically Signed by           Daniel Ryder DO  02/14/20 1954

## 2020-02-15 ENCOUNTER — APPOINTMENT (EMERGENCY)
Dept: NON INVASIVE DIAGNOSTICS | Facility: HOSPITAL | Age: 60
End: 2020-02-15
Payer: COMMERCIAL

## 2020-02-15 ENCOUNTER — HOSPITAL ENCOUNTER (EMERGENCY)
Facility: HOSPITAL | Age: 60
Discharge: HOME | End: 2020-02-15
Attending: PHYSICIAN ASSISTANT | Admitting: EMERGENCY MEDICINE
Payer: COMMERCIAL

## 2020-02-15 VITALS
SYSTOLIC BLOOD PRESSURE: 197 MMHG | OXYGEN SATURATION: 96 % | HEART RATE: 92 BPM | DIASTOLIC BLOOD PRESSURE: 80 MMHG | WEIGHT: 293 LBS | BODY MASS INDEX: 47.09 KG/M2 | HEIGHT: 66 IN | RESPIRATION RATE: 18 BRPM | TEMPERATURE: 97.8 F

## 2020-02-15 DIAGNOSIS — M79.661 PAIN AND SWELLING OF LOWER LEG, RIGHT: Primary | ICD-10-CM

## 2020-02-15 DIAGNOSIS — M79.604 LEG PAIN, RIGHT: ICD-10-CM

## 2020-02-15 DIAGNOSIS — M79.89 PAIN AND SWELLING OF LOWER LEG, RIGHT: Primary | ICD-10-CM

## 2020-02-15 PROCEDURE — 99282 EMERGENCY DEPT VISIT SF MDM: CPT | Performed by: PHYSICIAN ASSISTANT

## 2020-02-15 PROCEDURE — 93971 EXTREMITY STUDY: CPT

## 2020-02-15 PROCEDURE — 99284 EMERGENCY DEPT VISIT MOD MDM: CPT

## 2020-02-15 RX ORDER — IBUPROFEN 400 MG/1
800 TABLET ORAL ONCE
Status: COMPLETED | OUTPATIENT
Start: 2020-02-15 | End: 2020-02-15

## 2020-02-15 RX ADMIN — IBUPROFEN 800 MG: 400 TABLET ORAL at 10:31

## 2020-02-15 NOTE — ED NOTES
Call to supervisor, Catalina Garcia,  to have VAS completed by on-call staff        Lenny Allen RN  02/15/20 4635

## 2020-02-15 NOTE — ED PROVIDER NOTES
History  Chief Complaint   Patient presents with    Pain     pain in right leg  was in yesterday and instructed to return today for a study     79-year-old female presents to the ED for evaluation of right lower extremity pain and swelling  Patient reports she was seen in the ED yesterday for the same complaint, reports she was instructed to return to the hospital today for an outpatient duplex of the RLE  She states when she checked in to registration she was told US was not in today and she would have to check into the ED  Patient reports the pain and swelling started approximately 1 5 weeks ago after she fell off a chair  She reports no other injury at the time of fall  Patient states she has been taking tylenol for pain  She denies any history of DVT or PE  She denies any anticoagulation use however notes yesterday she received subcu Lovenox while in the ED  Patient denies chest pain, shortness of breath, fevers, chills  She denies noting any erythema or ecchymosis of the area  Prior to Admission Medications   Prescriptions Last Dose Informant Patient Reported? Taking?    Exenatide ER (BYDUREON) 2 MG PEN Past Week at Unknown time  Yes Yes   Sig: Inject 2 mg under the skin once a week   OZEMPIC, 0 25 OR 0 5 MG/DOSE, 2 MG/1 5ML SOPN Past Week at Unknown time  Yes Yes   PARoxetine (PAXIL) 10 mg tablet 2/14/2020 at Unknown time  Yes Yes   Sig: Take 10 mg by mouth daily   aspirin (ECOTRIN LOW STRENGTH) 81 mg EC tablet 2/14/2020 at Unknown time  Yes Yes   Sig: Take 81 mg by mouth daily   cyanocobalamin (VITAMIN B-12) 250 MCG tablet 2/14/2020 at Unknown time  No Yes   Sig: Take 1 tablet (250 mcg total) by mouth daily   ferrous sulfate 324 (65 Fe) mg 2/14/2020 at Unknown time  No Yes   Sig: Take 1 tablet (324 mg total) by mouth daily before breakfast   metoprolol succinate (TOPROL-XL) 100 mg 24 hr tablet 2/14/2020 at Unknown time  Yes Yes   Sig: Take 100 mg by mouth daily   simvastatin (ZOCOR) 20 mg tablet 2/14/2020 at Unknown time  Yes Yes   Sig: Take 20 mg by mouth daily at bedtime      Facility-Administered Medications: None       Past Medical History:   Diagnosis Date    Depression     Diabetes mellitus ([address] )     Fistula     High cholesterol     Hypertension        Past Surgical History:   Procedure Laterality Date    BOWEL RESECTION      CHOLECYSTECTOMY LAPAROSCOPIC      HERNIA REPAIR         Family History   Problem Relation Age of Onset    Heart disease Mother      I have reviewed and agree with the history as documented  Social History     Tobacco Use    Smoking status: Never Smoker    Smokeless tobacco: Never Used   Substance Use Topics    Alcohol use: Yes     Comment: rarely    Drug use: Never       Review of Systems   Constitutional: Negative for chills and fever  Respiratory: Negative for cough (No hemoptysis) and shortness of breath  Cardiovascular: Positive for leg swelling  Negative for chest pain and palpitations  Musculoskeletal: Negative for back pain, joint swelling, neck pain and neck stiffness  Right calf swelling and tenderness   Skin: Negative for color change, rash and wound  Neurological: Negative for syncope and numbness  All other systems reviewed and are negative  Physical Exam  Physical Exam   Constitutional: She is oriented to person, place, and time  She appears well-developed and well-nourished  No distress  Eyes: Pupils are equal, round, and reactive to light  Conjunctivae are normal    Musculoskeletal: Normal range of motion  Right lower leg: She exhibits tenderness and swelling  She exhibits no bony tenderness and no deformity  Right lower extremity: neurovascularly intact, palpable dorsalis pedis and posterior tib pulses  Cap refill less than 2 seconds  Normal sensation  Mild right calf swelling and tenderness to palpation  No erythema or ecchymosis  Bilateral lower extremity significant for multiple varicose veins  Neurological: She is alert and oriented to person, place, and time  She displays normal reflexes  No sensory deficit  Coordination normal    Skin: Skin is warm and dry  Capillary refill takes less than 2 seconds  No rash noted  No erythema  Psychiatric: She has a normal mood and affect  Her behavior is normal  Judgment and thought content normal    Nursing note and vitals reviewed  Vital Signs  ED Triage Vitals [02/15/20 1016]   Temperature Pulse Respirations Blood Pressure SpO2   97 8 °F (36 6 °C) 92 18 (!) 197/80 96 %      Temp Source Heart Rate Source Patient Position - Orthostatic VS BP Location FiO2 (%)   Oral -- -- Left arm --      Pain Score       6           Vitals:    02/15/20 1016   BP: (!) 197/80   Pulse: 92         Visual Acuity      ED Medications  Medications   ibuprofen (MOTRIN) tablet 800 mg (800 mg Oral Given 2/15/20 1031)       Diagnostic Studies  Results Reviewed     None                 VAS lower limb venous duplex study, unilateral/limited    (Results Pending)              Procedures  Procedures         ED Course  ED Course as of Feb 15 1137   Sat Feb 15, 2020   1122 Discussed results of lower limb venous duplex with ultrasound tech who reports no DVT, concern for ruptured bakers cyst          MDM  Number of Diagnoses or Management Options  Pain and swelling of lower leg, right: new and requires workup     Amount and/or Complexity of Data Reviewed  Tests in the radiology section of CPT®: ordered and reviewed  Independent visualization of images, tracings, or specimens: yes     68-year-old female presents to the ED for evaluation of right calf swelling and tenderness onset 1 week ago status post fall  Patient was seen in the ED yesterday with negative tib-fib x-ray and provided outpatient duplex prescription  Patient was unable to get duplex outpatient so return to the ED  Patient was provided Motrin for pain    Discussed results of the lower limb venous duplex with ultrasound tech reports no concern for DVT, concern for possible ruptured Baker cyst   She was educated results, findings, symptoms that require prompt return to the ED for further evaluation of verbalized understanding  She agreed with treatment plan, remained well in the ED and was discharged home  Disposition  Final diagnoses:   Pain and swelling of lower leg, right     Time reflects when diagnosis was documented in both MDM as applicable and the Disposition within this note     Time User Action Codes Description Comment    2/15/2020 11:16 AM Yvonne Lala Add [J16 244,  M79 89] Pain and swelling of lower leg, right     2/15/2020 11:20 AM Jan Figueroa Add [M79 604] Leg pain, right       ED Disposition     ED Disposition Condition Date/Time Comment    Discharge Stable Sat Feb 15, 2020 11:16 AM Zenon Kim discharge to home/self care  Follow-up Information     Follow up With Specialties Details Why Contact Info    Joseph Dodson MD Family Medicine In 1 week As needed, If symptoms worsen [address] [place] [address] [address] [phone]            Patient's Medications   Discharge Prescriptions    No medications on file     No discharge procedures on file      PDMP Review     None          ED Provider  Electronically Signed by           Micki Wolf PA-C  02/15/20 7501

## 2020-02-16 PROCEDURE — 93971 EXTREMITY STUDY: CPT | Performed by: SURGERY

## 2020-03-04 ENCOUNTER — APPOINTMENT (EMERGENCY)
Dept: RADIOLOGY | Facility: HOSPITAL | Age: 60
End: 2020-03-04
Payer: COMMERCIAL

## 2020-03-04 ENCOUNTER — HOSPITAL ENCOUNTER (EMERGENCY)
Facility: HOSPITAL | Age: 60
Discharge: HOME | End: 2020-03-04
Attending: EMERGENCY MEDICINE | Admitting: EMERGENCY MEDICINE
Payer: COMMERCIAL

## 2020-03-04 ENCOUNTER — APPOINTMENT (EMERGENCY)
Dept: NON INVASIVE DIAGNOSTICS | Facility: HOSPITAL | Age: 60
End: 2020-03-04
Payer: COMMERCIAL

## 2020-03-04 VITALS
RESPIRATION RATE: 18 BRPM | HEIGHT: 66 IN | DIASTOLIC BLOOD PRESSURE: 80 MMHG | BODY MASS INDEX: 47.09 KG/M2 | OXYGEN SATURATION: 94 % | SYSTOLIC BLOOD PRESSURE: 174 MMHG | WEIGHT: 293 LBS | TEMPERATURE: 97.5 F | HEART RATE: 76 BPM

## 2020-03-04 DIAGNOSIS — G89.29 CHRONIC PAIN OF RIGHT KNEE: Primary | ICD-10-CM

## 2020-03-04 DIAGNOSIS — M79.604 RIGHT LEG PAIN: ICD-10-CM

## 2020-03-04 DIAGNOSIS — M25.561 CHRONIC PAIN OF RIGHT KNEE: Primary | ICD-10-CM

## 2020-03-04 LAB
ANION GAP SERPL CALCULATED.3IONS-SCNC: 8 MMOL/L (ref 4–13)
BASOPHILS # BLD AUTO: 0.04 THOUSANDS/ΜL (ref 0–0.1)
BASOPHILS NFR BLD AUTO: 0 % (ref 0–1)
BUN SERPL-MCNC: 12 MG/DL (ref 5–25)
CALCIUM SERPL-MCNC: 7.9 MG/DL (ref 8.3–10.1)
CHLORIDE SERPL-SCNC: 103 MMOL/L (ref 100–108)
CO2 SERPL-SCNC: 26 MMOL/L (ref 21–32)
CREAT SERPL-MCNC: 0.65 MG/DL (ref 0.6–1.3)
EOSINOPHIL # BLD AUTO: 0.07 THOUSAND/ΜL (ref 0–0.61)
EOSINOPHIL NFR BLD AUTO: 1 % (ref 0–6)
ERYTHROCYTE [DISTWIDTH] IN BLOOD BY AUTOMATED COUNT: 14.4 % (ref 11.6–15.1)
GFR SERPL CREATININE-BSD FRML MDRD: 97 ML/MIN/1.73SQ M
GLUCOSE SERPL-MCNC: 133 MG/DL (ref 65–140)
HCT VFR BLD AUTO: 32.9 % (ref 34.8–46.1)
HGB BLD-MCNC: 10.6 G/DL (ref 11.5–15.4)
IMM GRANULOCYTES # BLD AUTO: 0.04 THOUSAND/UL (ref 0–0.2)
IMM GRANULOCYTES NFR BLD AUTO: 0 % (ref 0–2)
LYMPHOCYTES # BLD AUTO: 1.85 THOUSANDS/ΜL (ref 0.6–4.47)
LYMPHOCYTES NFR BLD AUTO: 20 % (ref 14–44)
MCH RBC QN AUTO: 27.8 PG (ref 26.8–34.3)
MCHC RBC AUTO-ENTMCNC: 32.2 G/DL (ref 31.4–37.4)
MCV RBC AUTO: 86 FL (ref 82–98)
MONOCYTES # BLD AUTO: 0.53 THOUSAND/ΜL (ref 0.17–1.22)
MONOCYTES NFR BLD AUTO: 6 % (ref 4–12)
NEUTROPHILS # BLD AUTO: 6.54 THOUSANDS/ΜL (ref 1.85–7.62)
NEUTS SEG NFR BLD AUTO: 73 % (ref 43–75)
NRBC BLD AUTO-RTO: 0 /100 WBCS
PLATELET # BLD AUTO: 332 THOUSANDS/UL (ref 149–390)
PMV BLD AUTO: 8.7 FL (ref 8.9–12.7)
POTASSIUM SERPL-SCNC: 4.3 MMOL/L (ref 3.5–5.3)
RBC # BLD AUTO: 3.81 MILLION/UL (ref 3.81–5.12)
SODIUM SERPL-SCNC: 137 MMOL/L (ref 136–145)
WBC # BLD AUTO: 9.07 THOUSAND/UL (ref 4.31–10.16)

## 2020-03-04 PROCEDURE — 93971 EXTREMITY STUDY: CPT

## 2020-03-04 PROCEDURE — 93971 EXTREMITY STUDY: CPT | Performed by: SURGERY

## 2020-03-04 PROCEDURE — 96374 THER/PROPH/DIAG INJ IV PUSH: CPT

## 2020-03-04 PROCEDURE — 73610 X-RAY EXAM OF ANKLE: CPT

## 2020-03-04 PROCEDURE — 85025 COMPLETE CBC W/AUTO DIFF WBC: CPT | Performed by: EMERGENCY MEDICINE

## 2020-03-04 PROCEDURE — 80048 BASIC METABOLIC PNL TOTAL CA: CPT | Performed by: EMERGENCY MEDICINE

## 2020-03-04 PROCEDURE — 99284 EMERGENCY DEPT VISIT MOD MDM: CPT

## 2020-03-04 PROCEDURE — 73590 X-RAY EXAM OF LOWER LEG: CPT

## 2020-03-04 PROCEDURE — 96375 TX/PRO/DX INJ NEW DRUG ADDON: CPT

## 2020-03-04 PROCEDURE — 73564 X-RAY EXAM KNEE 4 OR MORE: CPT

## 2020-03-04 PROCEDURE — 36415 COLL VENOUS BLD VENIPUNCTURE: CPT | Performed by: EMERGENCY MEDICINE

## 2020-03-04 PROCEDURE — 99284 EMERGENCY DEPT VISIT MOD MDM: CPT | Performed by: EMERGENCY MEDICINE

## 2020-03-04 RX ORDER — CYCLOBENZAPRINE HCL 10 MG
10 TABLET ORAL 2 TIMES DAILY PRN
Qty: 20 TABLET | Refills: 0 | Status: SHIPPED | OUTPATIENT
Start: 2020-03-04 | End: 2020-03-20 | Stop reason: ALTCHOICE

## 2020-03-04 RX ORDER — KETOROLAC TROMETHAMINE 30 MG/ML
15 INJECTION, SOLUTION INTRAMUSCULAR; INTRAVENOUS ONCE
Status: COMPLETED | OUTPATIENT
Start: 2020-03-04 | End: 2020-03-04

## 2020-03-04 RX ORDER — DEXAMETHASONE SODIUM PHOSPHATE 4 MG/ML
4 INJECTION, SOLUTION INTRA-ARTICULAR; INTRALESIONAL; INTRAMUSCULAR; INTRAVENOUS; SOFT TISSUE ONCE
Status: COMPLETED | OUTPATIENT
Start: 2020-03-04 | End: 2020-03-04

## 2020-03-04 RX ORDER — NAPROXEN 375 MG/1
375 TABLET ORAL 2 TIMES DAILY WITH MEALS
Qty: 20 TABLET | Refills: 0 | Status: SHIPPED | OUTPATIENT
Start: 2020-03-04 | End: 2020-03-20 | Stop reason: SDUPTHER

## 2020-03-04 RX ADMIN — DEXAMETHASONE SODIUM PHOSPHATE 4 MG: 4 INJECTION, SOLUTION INTRAMUSCULAR; INTRAVENOUS at 09:02

## 2020-03-04 RX ADMIN — KETOROLAC TROMETHAMINE 15 MG: 30 INJECTION, SOLUTION INTRAMUSCULAR at 08:57

## 2020-03-04 NOTE — ED NOTES
Vascular called and message left about order placed for duplex study       Noman De La Torre RN  03/04/20 2152

## 2020-03-04 NOTE — ED PROVIDER NOTES
History  Chief Complaint   Patient presents with    Knee Pain     Pt reports R knee pain when trying to go up 1 step when it buckled and gave out  Pt able to bear weight on R knee and walk on it but is just having pain in the area  Ruled out for DVT approx 2 weeks ago at this ED  63-year-old female with a past medical history of diabetes, depression, hypertension, hyperlipidemia, fistula and right Baker cyst presents with right knee and leg pain status post fall last night  Patient was initially seen in the emergency department on February 14th for similar symptoms after she had a fall at home in early February 2020  Patient states that she was attempting to sit down on a computer chair when the chair wheeled out behind her and she fell onto her buttocks  Patient then presented to the ER on February 14th and had a negative right tib-fib x-ray was given SC Lovenox  She was told to return to the ER the next day for a vascular duplex to rule out DVT  Patient was then seen in the ED on February 15th and had a negative duplex for DVT but did show a Baker cyst   Patient states she has been using Tylenol and Motrin at home for ongoing right leg pain but has been ambulating without difficulty  Last night patient was stepping up onto a step when her right knee buckled and she fell backwards onto her buttocks again  She did not hit her head or have head trauma or LOC  Patient was able to get up and ambulate afterwards  Patient states her pain is now behind her right knee and in the right calf muscle  She denies all other symptoms including fever, chills, rash, focal motor sensory deficits, headache, neck pain, back pain, abdominal pain, chest pain, shortness of breath, hip pain or foot pain  Patient did not have prodromal symptoms prior to fall including dizziness, chest pain or shortness of breath  Per RN, patient has been contacted by care management due to her repeated ER visits        History provided by: Patient   used: No    Leg Pain   Location:  Knee and leg  Time since incident:  1 day  Injury: yes    Mechanism of injury: fall    Fall:     Fall occurred:  Standing    Height of fall:  GLF    Impact surface:  [employer] of impact:  Buttocks    Entrapped after fall: no    Leg location:  R leg  Knee location:  R knee  Pain details:     Quality:  Aching    Radiates to:  R leg    Severity:  Moderate    Onset quality:  Sudden    Duration:  1 day    Timing:  Constant    Progression:  Worsening  Chronicity:  Recurrent  Dislocation: no    Foreign body present:  No foreign bodies  Prior injury to area:  Yes  Relieved by:  Acetaminophen and NSAIDs  Worsened by:  Bearing weight and extension  Ineffective treatments:  Acetaminophen and NSAIDs  Associated symptoms: no back pain, no decreased ROM, no fatigue, no fever, no itching, no muscle weakness, no neck pain, no numbness, no stiffness, no swelling and no tingling    Risk factors: obesity    Risk factors: no concern for non-accidental trauma, no frequent fractures and no recent illness        Prior to Admission Medications   Prescriptions Last Dose Informant Patient Reported? Taking?    Exenatide ER (BYDUREON) 2 MG PEN 3/3/2020 at Unknown time  Yes Yes   Sig: Inject 2 mg under the skin once a week   OZEMPIC, 0 25 OR 0 5 MG/DOSE, 2 MG/1 5ML SOPN 3/3/2020 at Unknown time  Yes Yes   PARoxetine (PAXIL) 10 mg tablet 3/3/2020 at Unknown time  Yes Yes   Sig: Take 10 mg by mouth daily   aspirin (ECOTRIN LOW STRENGTH) 81 mg EC tablet 3/3/2020 at Unknown time  Yes Yes   Sig: Take 81 mg by mouth daily   cyanocobalamin (VITAMIN B-12) 250 MCG tablet 3/3/2020 at Unknown time  No Yes   Sig: Take 1 tablet (250 mcg total) by mouth daily   ferrous sulfate 324 (65 Fe) mg 3/3/2020 at Unknown time  No Yes   Sig: Take 1 tablet (324 mg total) by mouth daily before breakfast   metoprolol succinate (TOPROL-XL) 100 mg 24 hr tablet 3/3/2020 at Unknown time  Yes Yes   Sig: Take 100 mg by mouth daily   simvastatin (ZOCOR) 20 mg tablet 3/3/2020 at Unknown time  Yes Yes   Sig: Take 20 mg by mouth daily at bedtime      Facility-Administered Medications: None       Past Medical History:   Diagnosis Date    Depression     Diabetes mellitus ([facility] [address] )     Fistula     High cholesterol     Hypertension        Past Surgical History:   Procedure Laterality Date    BOWEL RESECTION      CHOLECYSTECTOMY LAPAROSCOPIC      HERNIA REPAIR         Family History   Problem Relation Age of Onset    Heart disease Mother      I have reviewed and agree with the history as documented  E-Cigarette/Vaping    E-Cigarette Use Never User      E-Cigarette/Vaping Substances    Nicotine No     Flavoring No      Social History     Tobacco Use    Smoking status: Never Smoker    Smokeless tobacco: Never Used   Substance Use Topics    Alcohol use: Yes     Comment: rarely    Drug use: Never       Review of Systems   Constitutional: Negative for chills, diaphoresis, fatigue and fever  HENT: Negative for congestion, drooling, facial swelling, nosebleeds, sneezing, trouble swallowing and voice change  Eyes: Negative for photophobia, pain and visual disturbance  Respiratory: Negative for cough, chest tightness, shortness of breath and wheezing  Cardiovascular: Negative for chest pain, palpitations and leg swelling  Gastrointestinal: Negative for abdominal pain, constipation, diarrhea, nausea and vomiting  Genitourinary: Negative for decreased urine volume, difficulty urinating, dysuria, flank pain, frequency and hematuria  Musculoskeletal: Positive for arthralgias ( right knee) and myalgias (Right gastrocnemius muscle)  Negative for back pain, gait problem, joint swelling, neck pain, neck stiffness and stiffness  Skin: Negative for color change, itching, pallor, rash and wound  Allergic/Immunologic: Negative for immunocompromised state     Neurological: Negative for dizziness, tremors, seizures, syncope, facial asymmetry, speech difficulty, weakness, light-headedness, numbness and headaches  Hematological: Negative for adenopathy  Psychiatric/Behavioral: Negative for agitation, confusion, hallucinations and suicidal ideas  The patient is not nervous/anxious  Physical Exam  Physical Exam   Constitutional: She is oriented to person, place, and time  Vital signs are normal  She appears well-developed and well-nourished  She is cooperative  Non-toxic appearance  She does not have a sickly appearance  She does not appear ill  No distress  HENT:   Head: Normocephalic and atraumatic  Right Ear: Hearing, tympanic membrane, external ear and ear canal normal    Left Ear: Hearing, tympanic membrane, external ear and ear canal normal    Nose: Nose normal  Right sinus exhibits no maxillary sinus tenderness and no frontal sinus tenderness  Left sinus exhibits no maxillary sinus tenderness and no frontal sinus tenderness  Mouth/Throat: Uvula is midline, oropharynx is clear and moist and mucous membranes are normal  No oropharyngeal exudate, posterior oropharyngeal edema, posterior oropharyngeal erythema or tonsillar abscesses  Eyes: Pupils are equal, round, and reactive to light  Conjunctivae, EOM and lids are normal    Neck: Trachea normal, normal range of motion, full passive range of motion without pain and phonation normal  Neck supple  No thyroid mass and no thyromegaly present  Cardiovascular: Normal rate, regular rhythm, S1 normal, S2 normal, normal heart sounds, intact distal pulses and normal pulses  Pulses:       Radial pulses are 2+ on the right side, and 2+ on the left side  Dorsalis pedis pulses are 2+ on the right side, and 2+ on the left side  Pulmonary/Chest: Effort normal and breath sounds normal  No accessory muscle usage or stridor  No tachypnea  No respiratory distress  She has no decreased breath sounds  She has no wheezes  She has no rhonchi  She has no rales   She exhibits no mass, no tenderness, no deformity and no retraction  Abdominal: Soft  Bowel sounds are normal  She exhibits no distension, no ascites and no mass  There is no hepatosplenomegaly  There is no tenderness  There is no rigidity, no rebound, no guarding, no CVA tenderness, no tenderness at McBurney's point and negative Sigala's sign  No hernia  Musculoskeletal: Normal range of motion  She exhibits tenderness  She exhibits no edema or deformity  Right knee: She exhibits normal range of motion, no swelling, no effusion, no ecchymosis, no deformity, no laceration, no erythema, normal alignment, no LCL laxity, normal patellar mobility, no bony tenderness, normal meniscus and no MCL laxity  Tenderness found  Patellar tendon tenderness noted  Right lower leg: She exhibits tenderness  She exhibits no bony tenderness, no swelling, no edema, no deformity and no laceration  Point tenderness to right patellar region, no deformity, crepitus, effusion, bruising, erythema or rash  Tenderness to right gastrocnemius muscle, no erythema, induration or swelling  Right lower extremity has full range of motion and is neurovascularly intact with good cap refill less than 2 seconds and +2 dorsalis pedis pulse  Limb is warm and pink  Motor and sensation intact  Negative straight leg test    Lymphadenopathy:     She has no cervical adenopathy  Neurological: She is alert and oriented to person, place, and time  She has normal strength  She is not disoriented  She displays no atrophy and no tremor  No cranial nerve deficit or sensory deficit  She exhibits normal muscle tone  She displays a negative Romberg sign  She displays no seizure activity  Coordination and gait normal  GCS eye subscore is 4  GCS verbal subscore is 5  GCS motor subscore is 6     Patient is AAOx4, GCS 15; speaking clearly and appropriately; motor and sensation intact; visual fields intact; cranial nerves II-XII grossly intact; no facial droop, slurred speech or arm drift   Skin: Skin is warm, dry and intact  Capillary refill takes less than 2 seconds  No abrasion, no bruising, no burn, no ecchymosis, no laceration, no lesion, no petechiae and no rash noted  Rash is not maculopapular  She is not diaphoretic  No erythema  No pallor  Psychiatric: She has a normal mood and affect  Her speech is normal and behavior is normal  Judgment and thought content normal  She is not actively hallucinating  Cognition and memory are normal  She is attentive  Nursing note and vitals reviewed        Vital Signs  ED Triage Vitals [03/04/20 0836]   Temperature Pulse Respirations Blood Pressure SpO2   97 5 °F (36 4 °C) 84 16 136/76 95 %      Temp Source Heart Rate Source Patient Position - Orthostatic VS BP Location FiO2 (%)   Temporal Monitor Lying Left arm --      Pain Score       6           Vitals:    03/04/20 0836 03/04/20 1000 03/04/20 1143   BP: 136/76 150/72 (!) 174/80   Pulse: 84 71 76   Patient Position - Orthostatic VS: Lying  Lying         Visual Acuity      ED Medications  Medications   ketorolac (TORADOL) injection 15 mg (15 mg Intravenous Given 3/4/20 0857)   dexamethasone (DECADRON) injection 4 mg (4 mg Intravenous Given 3/4/20 0902)       Diagnostic Studies  Results Reviewed     Procedure Component Value Units Date/Time    Basic metabolic panel [[id number]]  (Abnormal) Collected:  03/04/20 0852    Lab Status:  Final result Specimen:  Blood from Line, Venous Updated:  03/04/20 4633     Sodium 137 mmol/L      Potassium 4 3 mmol/L      Chloride 103 mmol/L      CO2 26 mmol/L      ANION GAP 8 mmol/L      BUN 12 mg/dL      Creatinine 0 65 mg/dL      Glucose 133 mg/dL      Calcium 7 9 mg/dL      eGFR 97 ml/min/1 73sq m     Narrative:       [place] guidelines for Chronic Kidney Disease (CKD):     Stage 1 with normal or high GFR (GFR > 90 mL/min/1 73 square meters)    Stage 2 Mild CKD (GFR = 60-89 mL/min/1 73 square meters)   Stage 3A Moderate CKD (GFR = 45-59 mL/min/1 73 square meters)    Stage 3B Moderate CKD (GFR = 30-44 mL/min/1 73 square meters)    Stage 4 Severe CKD (GFR = 15-29 mL/min/1 73 square meters)    Stage 5 End Stage CKD (GFR <15 mL/min/1 73 square meters)  Note: GFR calculation is accurate only with a steady state creatinine    CBC and differential [[id number]]  (Abnormal) Collected:  03/04/20 0852    Lab Status:  Final result Specimen:  Blood from Line, Venous Updated:  03/04/20 0902     WBC 9 07 Thousand/uL      RBC 3 81 Million/uL      Hemoglobin 10 6 g/dL      Hematocrit 32 9 %      MCV 86 fL      MCH 27 8 pg      MCHC 32 2 g/dL      RDW 14 4 %      MPV 8 7 fL      Platelets 159 Thousands/uL      nRBC 0 /100 WBCs      Neutrophils Relative 73 %      Immat GRANS % 0 %      Lymphocytes Relative 20 %      Monocytes Relative 6 %      Eosinophils Relative 1 %      Basophils Relative 0 %      Neutrophils Absolute 6 54 Thousands/µL      Immature Grans Absolute 0 04 Thousand/uL      Lymphocytes Absolute 1 85 Thousands/µL      Monocytes Absolute 0 53 Thousand/µL      Eosinophils Absolute 0 07 Thousand/µL      Basophils Absolute 0 04 Thousands/µL                  VAS lower limb venous duplex study, unilateral/limited   Final Result by Donovan Hartley DO (03/04 1452)      XR knee 4+ vw right injury   Final Result by Shai Guzman MD (03/04 5654)      No acute osseous abnormality  Workstation performed: [id number]         XR tibia fibula 2 views RIGHT   Final Result by Shai Guzman MD (61/09 5049)      No acute osseous abnormality  Workstation performed: [id number]         XR ankle 3+ views RIGHT   Final Result by Shai Guzman MD (41/72 8474)      No acute osseous abnormality  Workstation performed: [id number]                    Procedures  Procedures         ED Course  ED Course as of Mar 06 0143   Wed Mar 04, 2020   3478 Reassessed patient  Reviewed negative imaging and normal labs  1127 Observe patient ambulating to bathroom with slight limp but otherwise ambulating without difficulty  1137 Informed by vascular tech that duplex ultrasound was negative for deep vein thrombosis  1137 Right tib fib xray:IMPRESSION:     No acute osseous abnormality                1137 Right knee xray:   IMPRESSION:     No acute osseous abnormality                1138 Right ankle xray:   IMPRESSION:     No acute osseous abnormality          1138 Reassessed patient  She still having mild right knee and right lower leg pain  Will provide knee immobilizer  Will discharge to home PCP follow-up and orthopedic referral   Will give prescription for naproxen and Flexeril  Instructed patient not to drive while taking Flexeril that may make her drowsy  Strict return to ER precautions discussed and acknowledged by patient              MDM  Number of Diagnoses or Management Options     Amount and/or Complexity of Data Reviewed  Clinical lab tests: reviewed and ordered  Tests in the radiology section of CPT®: reviewed and ordered  Tests in the medicine section of CPT®: ordered and reviewed  Review and summarize past medical records: yes  Independent visualization of images, tracings, or specimens: yes (Duplex ultrasound right lower extremity, right knee, tib-fib and ankle x-rays)    Risk of Complications, Morbidity, and/or Mortality  Presenting problems: moderate  Diagnostic procedures: moderate  Management options: moderate    Patient Progress  Patient progress: stable        Disposition  Final diagnoses:   Chronic pain of right knee   Right leg pain     Time reflects when diagnosis was documented in both MDM as applicable and the Disposition within this note     Time User Action Codes Description Comment    3/4/2020 11:39 AM Monico Richards [[id number],  G89 29] Chronic pain of right knee     3/4/2020 11:39 AM Monico Richards [F84 [id number]] Right leg pain       ED Disposition     ED Disposition Condition Date/Time Comment    Discharge Stable Wed Mar 4, 2020 11:39 AM Romelia  discharge to home/self care  Follow-up Information     Follow up With Specialties Details Why Contact Info    Beryle Aloe, MD Family Medicine Call in 1 day As needed, If symptoms worsen [address] [place] [address],[facility] [address]  Seb Ware MD Orthopedic Surgery Call in 1 day Call orthopedic surgeon if your right leg pain persists  [address] [facility]  [phone]            Discharge Medication List as of 3/4/2020 11:42 AM      START taking these medications    Details   cyclobenzaprine (FLEXERIL) 10 mg tablet Take 1 tablet (10 mg total) by mouth 2 (two) times a day as needed for muscle spasms Do not drive while taking Flexeril , Starting Wed 3/4/2020, Print      naproxen (NAPROSYN) 375 mg tablet Take 1 tablet (375 mg total) by mouth 2 (two) times a day with meals, Starting Wed 3/4/2020, Print         CONTINUE these medications which have NOT CHANGED    Details   aspirin (ECOTRIN LOW STRENGTH) 81 mg EC tablet Take 81 mg by mouth daily, Historical Med      cyanocobalamin (VITAMIN B-12) 250 MCG tablet Take 1 tablet (250 mcg total) by mouth daily, Starting Thu 12/5/2019, Normal      Exenatide ER (BYDUREON) 2 MG PEN Inject 2 mg under the skin once a week, Historical Med      ferrous sulfate 324 (65 Fe) mg Take 1 tablet (324 mg total) by mouth daily before breakfast, Starting Thu 12/5/2019, Normal      metoprolol succinate (TOPROL-XL) 100 mg 24 hr tablet Take 100 mg by mouth daily, Starting Tue 10/1/2019, Historical Med      OZEMPIC, 0 25 OR 0 5 MG/DOSE, 2 MG/1 5ML SOPN Starting Wed 1/29/2020, Historical Med      PARoxetine (PAXIL) 10 mg tablet Take 10 mg by mouth daily, Historical Med      simvastatin (ZOCOR) 20 mg tablet Take 20 mg by mouth daily at bedtime, Historical Med           No discharge procedures on file      PDMP Review     None          ED Provider  Electronically Signed by    MD Haydee Pascual MD  03/06/20 4364

## 2020-03-09 ENCOUNTER — TELEPHONE (OUTPATIENT)
Dept: OBGYN CLINIC | Facility: CLINIC | Age: 60
End: 2020-03-09

## 2020-03-10 ENCOUNTER — TELEPHONE (OUTPATIENT)
Dept: OBGYN CLINIC | Facility: CLINIC | Age: 60
End: 2020-03-10

## 2020-03-11 ENCOUNTER — TELEPHONE (OUTPATIENT)
Dept: OBGYN CLINIC | Facility: CLINIC | Age: 60
End: 2020-03-11

## 2020-03-20 ENCOUNTER — TELEPHONE (OUTPATIENT)
Dept: OBGYN CLINIC | Facility: HOSPITAL | Age: 60
End: 2020-03-20

## 2020-03-20 ENCOUNTER — OFFICE VISIT (OUTPATIENT)
Dept: OBGYN CLINIC | Facility: CLINIC | Age: 60
End: 2020-03-20
Payer: COMMERCIAL

## 2020-03-20 ENCOUNTER — TELEPHONE (OUTPATIENT)
Dept: OBGYN CLINIC | Facility: CLINIC | Age: 60
End: 2020-03-20

## 2020-03-20 VITALS
HEIGHT: 66 IN | DIASTOLIC BLOOD PRESSURE: 81 MMHG | SYSTOLIC BLOOD PRESSURE: 142 MMHG | WEIGHT: 293 LBS | HEART RATE: 85 BPM | TEMPERATURE: 98.5 F | RESPIRATION RATE: 18 BRPM | BODY MASS INDEX: 47.09 KG/M2

## 2020-03-20 DIAGNOSIS — M25.561 CHRONIC PAIN OF RIGHT KNEE: ICD-10-CM

## 2020-03-20 DIAGNOSIS — M79.604 RIGHT LEG PAIN: ICD-10-CM

## 2020-03-20 DIAGNOSIS — M17.11 PRIMARY OSTEOARTHRITIS OF RIGHT KNEE: ICD-10-CM

## 2020-03-20 DIAGNOSIS — G89.29 CHRONIC PAIN OF RIGHT KNEE: ICD-10-CM

## 2020-03-20 DIAGNOSIS — M79.661 PAIN OF RIGHT LOWER LEG: Primary | ICD-10-CM

## 2020-03-20 PROCEDURE — 20610 DRAIN/INJ JOINT/BURSA W/O US: CPT | Performed by: ORTHOPAEDIC SURGERY

## 2020-03-20 PROCEDURE — 99204 OFFICE O/P NEW MOD 45 MIN: CPT | Performed by: ORTHOPAEDIC SURGERY

## 2020-03-20 RX ORDER — LIDOCAINE HYDROCHLORIDE 10 MG/ML
4 INJECTION, SOLUTION INFILTRATION; PERINEURAL
Status: COMPLETED | OUTPATIENT
Start: 2020-03-20 | End: 2020-03-20

## 2020-03-20 RX ORDER — NAPROXEN 375 MG/1
375 TABLET ORAL 2 TIMES DAILY WITH MEALS
Qty: 30 TABLET | Refills: 0 | Status: SHIPPED | OUTPATIENT
Start: 2020-03-20 | End: 2020-03-23 | Stop reason: SDUPTHER

## 2020-03-20 RX ORDER — TRIAMCINOLONE ACETONIDE 40 MG/ML
40 INJECTION, SUSPENSION INTRA-ARTICULAR; INTRAMUSCULAR
Status: COMPLETED | OUTPATIENT
Start: 2020-03-20 | End: 2020-03-20

## 2020-03-20 RX ADMIN — LIDOCAINE HYDROCHLORIDE 4 ML: 10 INJECTION, SOLUTION INFILTRATION; PERINEURAL at 15:42

## 2020-03-20 RX ADMIN — TRIAMCINOLONE ACETONIDE 40 MG: 40 INJECTION, SUSPENSION INTRA-ARTICULAR; INTRAMUSCULAR at 15:42

## 2020-03-20 NOTE — TELEPHONE ENCOUNTER
Caller:Cristina from [facility] in [facility]  Provider: Dr Latoya Ornelas  Call back: [phone]  Reason for call:    Pharmacy calling because the patient came in with an unsigned RX for naproxen (NAPROSYN) 375 mg tablet [[id number]]    Pharmacy requested that th e order be e-scribed  Please advise

## 2020-03-20 NOTE — TELEPHONE ENCOUNTER
CALLED PATIENT LVM TO RESCHEDULE TO AN EARLIER APPT TODAY 3/20/20 TRYING FOR 1 OR 2:00 LEFT CALL BACK # [phone]

## 2020-03-20 NOTE — PROGRESS NOTES
ASSESSMENT/PLAN:    Diagnoses and all orders for this visit:    Pain of right lower leg    Chronic pain of right knee  -     naproxen (NAPROSYN) 375 mg tablet; Take 1 tablet (375 mg total) by mouth 2 (two) times a day with meals    Primary osteoarthritis of right knee    Right leg pain  -     naproxen (NAPROSYN) 375 mg tablet; Take 1 tablet (375 mg total) by mouth 2 (two) times a day with meals        Plan:  I would recommend follow-up as needed  I discussed treatment options and she did elect to proceed with injection which was performed without difficulty  In addition, she would like to resume taking the Naprosyn and a 2 week prescription was sent to her local pharmacy  If symptoms do not respond, re-evaluation in 2-3 weeks would be appropriate  I have encouraged her to contact me if any questions or concerns arise  Return if symptoms worsen or fail to improve       _____________________________________________________  CHIEF COMPLAINT:  Chief Complaint   Patient presents with    Right Knee - Pain         SUBJECTIVE:  Sulema Rodriguez is a 61y o  year old female who presents for evaluation of right knee and calf pain  She states she has had 2 falls both occurring at home  The original fall occurred approximately 6 or 7 weeks ago  She was seen in the emergency room on multiple occasions for her calf and knee pain  X-rays were obtained  At the most recent visit, 03/04/2020, she was provided with a knee immobilizer and prescribed Flexeril as well as Naprosyn  She stopped taking the Flexeril because it interfered with her sleep, causing her to be unable to sleep  She noted the improvement with the Naprosyn but upon questioning her as to whether symptoms have changed at all she states that the symptoms have never changed since onset in February  She complains of pain in the posterior calf radiating around to the anterior knee  She notes knee pain with activity but calf pain more with rest then activity  She states that she was told she has a cyst in the back of her knee  She denies any symptoms prior to onset in early February 2020  She has continued to work in the [address] [place] [address] at [facility] [employer] throughout this time frame  She discontinue the knee immobilizer after several days when it seemed to be causing her pain in her foot  She denies any left-sided symptoms at this time  She states that she has previously been treated for left knee symptoms with a corticosteroid injection which provided good relief  She denies lower extremity paresthesias      PAST MEDICAL HISTORY:  Past Medical History:   Diagnosis Date    Depression     Diabetes mellitus ([address] )     Fistula     High cholesterol     Hypertension        PAST SURGICAL HISTORY:  Past Surgical History:   Procedure Laterality Date    BOWEL RESECTION      CHOLECYSTECTOMY LAPAROSCOPIC      HERNIA REPAIR         FAMILY HISTORY:  Family History   Problem Relation Age of Onset    Heart disease Mother        SOCIAL HISTORY:  Social History     Tobacco Use    Smoking status: Never Smoker    Smokeless tobacco: Never Used   Substance Use Topics    Alcohol use: Yes     Comment: rarely    Drug use: Never       MEDICATIONS:    Current Outpatient Medications:     aspirin (ECOTRIN LOW STRENGTH) 81 mg EC tablet, Take 81 mg by mouth daily, Disp: , Rfl:     cyanocobalamin (VITAMIN B-12) 250 MCG tablet, Take 1 tablet (250 mcg total) by mouth daily, Disp: 30 tablet, Rfl: 0    ferrous sulfate 324 (65 Fe) mg, Take 1 tablet (324 mg total) by mouth daily before breakfast, Disp: 30 tablet, Rfl: 0    metoprolol succinate (TOPROL-XL) 100 mg 24 hr tablet, Take 100 mg by mouth daily, Disp: , Rfl:     naproxen (NAPROSYN) 375 mg tablet, Take 1 tablet (375 mg total) by mouth 2 (two) times a day with meals, Disp: 30 tablet, Rfl: 0    OZEMPIC, 0 25 OR 0 5 MG/DOSE, 2 MG/1 5ML SOPN, , Disp: , Rfl:     PARoxetine (PAXIL) 10 mg tablet, Take 10 mg by mouth daily, Disp: , Rfl:     simvastatin (ZOCOR) 20 mg tablet, Take 20 mg by mouth daily at bedtime, Disp: , Rfl:     ALLERGIES:  Allergies   Allergen Reactions    Zithromax [Azithromycin] Abdominal Pain    Penicillins Rash       Review of systems:   Constitutional: Negative for fatigue, fever or loss of apetite  HENT: Negative  Respiratory: Negative for shortness of breath, dyspnea  Cardiovascular: Negative for chest pain/tightness  Gastrointestinal: Negative for abdominal pain, N/V  Endocrine: Negative for cold/heat intolerance, unexplained weight loss/gain  Genitourinary: Negative for flank pain, dysuria, hematuria  Musculoskeletal:  Positive as in the HPI   Skin: Negative for rash  Neurological:  Negative  Psychiatric/Behavioral: Negative for agitation  _____________________________________________________  PHYSICAL EXAMINATION:    Blood pressure 142/81, pulse 85, temperature 98 5 °F (36 9 °C), resp  rate 18, height 5' 6" (1 676 m), weight (!) 139 kg (306 lb 7 oz)  General: well developed and well nourished, alert, oriented times 3 and appears comfortable  Psychiatric: Normal  HEENT: Benign  Cardiovascular: Regular    Pulmonary: No wheezing or stridor  Abdomen: Soft, Nontender  Skin: No masses, erythema, lacerations, fluctation, ulcerations  Neurovascular: Motor and sensory exams are grossly intact and pulses are palpable  MUSCULOSKELETAL EXAMINATION:    The right knee exam demonstrates the skin to be warm and dry  There is no erythema or rubor and no detectable effusion  I was unable to palpate any popliteal cyst   She has full extension and flexion to the limits of body habitus but complains of pain with flexion  However, pain is localized to her calf and not her knee  She notes tenderness to palpation of the calf musculature in a diffuse fashion without any localized area  She denies any tenderness in the popliteal fossa  The knee is nontender anteriorly, medially and laterally  Ligaments are stable  There is no detectable deformity to inspection  She does have mild crepitus with passive range of motion of the knee as well as with active range of motion of the right knee  The remainder of the lower extremity examination bilaterally is benign  _____________________________________________________  STUDIES REVIEWED:  Multiple x-rays were reviewed including x-rays of her leg obtained on 02/14/2020 and x-rays of her knee, leg and ankle obtained on 03/04/2020  These images all demonstrate no acute abnormality  The knee x-rays demonstrate slight narrowing of the medial joint space and questionable subchondral sclerosis of the medial tibial plateau  Reports were reviewed    Ultrasound report indicates a popliteal cyst     PROCEDURES:  Large joint arthrocentesis: R knee  Date/Time: 3/20/2020 3:42 PM  Consent given by: patient  Supporting Documentation  Indications: pain   Procedure Details  Location: knee - R knee  Needle size: 22 G  Ultrasound guidance: no  Approach: anterolateral  Medications administered: 4 mL lidocaine 1 %; 40 mg triamcinolone acetonide 40 mg/mL              [place]

## 2020-03-23 RX ORDER — NAPROXEN 375 MG/1
375 TABLET ORAL 2 TIMES DAILY WITH MEALS
Qty: 30 TABLET | Refills: 0 | Status: SHIPPED | OUTPATIENT
Start: 2020-03-23 | End: 2021-08-22 | Stop reason: HOSPADM

## 2020-06-29 ENCOUNTER — HOSPITAL ENCOUNTER (EMERGENCY)
Facility: HOSPITAL | Age: 60
Discharge: HOME | End: 2020-06-29
Attending: EMERGENCY MEDICINE | Admitting: EMERGENCY MEDICINE
Payer: COMMERCIAL

## 2020-06-29 ENCOUNTER — APPOINTMENT (EMERGENCY)
Dept: CT IMAGING | Facility: HOSPITAL | Age: 60
End: 2020-06-29
Payer: COMMERCIAL

## 2020-06-29 VITALS
WEIGHT: 275.13 LBS | RESPIRATION RATE: 20 BRPM | TEMPERATURE: 98.6 F | BODY MASS INDEX: 46.97 KG/M2 | SYSTOLIC BLOOD PRESSURE: 153 MMHG | HEART RATE: 78 BPM | DIASTOLIC BLOOD PRESSURE: 70 MMHG | HEIGHT: 64 IN | OXYGEN SATURATION: 95 %

## 2020-06-29 DIAGNOSIS — L02.211 ABDOMINAL WALL ABSCESS: ICD-10-CM

## 2020-06-29 DIAGNOSIS — K43.9 VENTRAL HERNIA WITHOUT OBSTRUCTION OR GANGRENE: ICD-10-CM

## 2020-06-29 DIAGNOSIS — D72.829 LEUKOCYTOSIS: Primary | ICD-10-CM

## 2020-06-29 DIAGNOSIS — R10.9 ABDOMINAL PAIN, UNSPECIFIED ABDOMINAL LOCATION: ICD-10-CM

## 2020-06-29 LAB
ALBUMIN SERPL BCP-MCNC: 4 G/DL (ref 3.5–5)
ALP SERPL-CCNC: 90 U/L (ref 46–116)
ALT SERPL W P-5'-P-CCNC: 26 U/L (ref 12–78)
ANION GAP SERPL CALCULATED.3IONS-SCNC: 10 MMOL/L (ref 4–13)
APTT PPP: 28 SECONDS (ref 23–37)
AST SERPL W P-5'-P-CCNC: 13 U/L (ref 5–45)
BASOPHILS # BLD AUTO: 0.05 THOUSANDS/ΜL (ref 0–0.1)
BASOPHILS NFR BLD AUTO: 0 % (ref 0–1)
BILIRUB SERPL-MCNC: 0.33 MG/DL (ref 0.2–1)
BUN SERPL-MCNC: 31 MG/DL (ref 5–25)
CALCIUM SERPL-MCNC: 10.2 MG/DL (ref 8.3–10.1)
CHLORIDE SERPL-SCNC: 100 MMOL/L (ref 100–108)
CO2 SERPL-SCNC: 29 MMOL/L (ref 21–32)
CREAT SERPL-MCNC: 1.28 MG/DL (ref 0.6–1.3)
EOSINOPHIL # BLD AUTO: 0.02 THOUSAND/ΜL (ref 0–0.61)
EOSINOPHIL NFR BLD AUTO: 0 % (ref 0–6)
ERYTHROCYTE [DISTWIDTH] IN BLOOD BY AUTOMATED COUNT: 13.8 % (ref 11.6–15.1)
GFR SERPL CREATININE-BSD FRML MDRD: 46 ML/MIN/1.73SQ M
GLUCOSE SERPL-MCNC: 159 MG/DL (ref 65–140)
HCT VFR BLD AUTO: 40.8 % (ref 34.8–46.1)
HGB BLD-MCNC: 13.7 G/DL (ref 11.5–15.4)
IMM GRANULOCYTES # BLD AUTO: 0.1 THOUSAND/UL (ref 0–0.2)
IMM GRANULOCYTES NFR BLD AUTO: 1 % (ref 0–2)
INR PPP: 0.96 (ref 0.84–1.19)
LACTATE SERPL-SCNC: 2.3 MMOL/L (ref 0.5–2)
LACTATE SERPL-SCNC: 2.4 MMOL/L (ref 0.5–2)
LIPASE SERPL-CCNC: 135 U/L (ref 73–393)
LYMPHOCYTES # BLD AUTO: 1.97 THOUSANDS/ΜL (ref 0.6–4.47)
LYMPHOCYTES NFR BLD AUTO: 11 % (ref 14–44)
MCH RBC QN AUTO: 29.5 PG (ref 26.8–34.3)
MCHC RBC AUTO-ENTMCNC: 33.6 G/DL (ref 31.4–37.4)
MCV RBC AUTO: 88 FL (ref 82–98)
MONOCYTES # BLD AUTO: 0.88 THOUSAND/ΜL (ref 0.17–1.22)
MONOCYTES NFR BLD AUTO: 5 % (ref 4–12)
NEUTROPHILS # BLD AUTO: 14.53 THOUSANDS/ΜL (ref 1.85–7.62)
NEUTS SEG NFR BLD AUTO: 83 % (ref 43–75)
NRBC BLD AUTO-RTO: 0 /100 WBCS
PLATELET # BLD AUTO: 414 THOUSANDS/UL (ref 149–390)
PMV BLD AUTO: 8.7 FL (ref 8.9–12.7)
POTASSIUM SERPL-SCNC: 4.5 MMOL/L (ref 3.5–5.3)
PROT SERPL-MCNC: 8.1 G/DL (ref 6.4–8.2)
PROTHROMBIN TIME: 12.8 SECONDS (ref 11.6–14.5)
RBC # BLD AUTO: 4.65 MILLION/UL (ref 3.81–5.12)
SODIUM SERPL-SCNC: 139 MMOL/L (ref 136–145)
WBC # BLD AUTO: 17.55 THOUSAND/UL (ref 4.31–10.16)

## 2020-06-29 PROCEDURE — 85730 THROMBOPLASTIN TIME PARTIAL: CPT | Performed by: EMERGENCY MEDICINE

## 2020-06-29 PROCEDURE — 96374 THER/PROPH/DIAG INJ IV PUSH: CPT

## 2020-06-29 PROCEDURE — 99285 EMERGENCY DEPT VISIT HI MDM: CPT | Performed by: EMERGENCY MEDICINE

## 2020-06-29 PROCEDURE — 74177 CT ABD & PELVIS W/CONTRAST: CPT

## 2020-06-29 PROCEDURE — 96361 HYDRATE IV INFUSION ADD-ON: CPT

## 2020-06-29 PROCEDURE — 85025 COMPLETE CBC W/AUTO DIFF WBC: CPT | Performed by: EMERGENCY MEDICINE

## 2020-06-29 PROCEDURE — 80053 COMPREHEN METABOLIC PANEL: CPT | Performed by: EMERGENCY MEDICINE

## 2020-06-29 PROCEDURE — 36415 COLL VENOUS BLD VENIPUNCTURE: CPT | Performed by: EMERGENCY MEDICINE

## 2020-06-29 PROCEDURE — 83690 ASSAY OF LIPASE: CPT | Performed by: EMERGENCY MEDICINE

## 2020-06-29 PROCEDURE — 99284 EMERGENCY DEPT VISIT MOD MDM: CPT

## 2020-06-29 PROCEDURE — 85610 PROTHROMBIN TIME: CPT | Performed by: EMERGENCY MEDICINE

## 2020-06-29 PROCEDURE — 96375 TX/PRO/DX INJ NEW DRUG ADDON: CPT

## 2020-06-29 PROCEDURE — 83605 ASSAY OF LACTIC ACID: CPT | Performed by: EMERGENCY MEDICINE

## 2020-06-29 RX ORDER — ONDANSETRON 2 MG/ML
4 INJECTION INTRAMUSCULAR; INTRAVENOUS ONCE
Status: COMPLETED | OUTPATIENT
Start: 2020-06-29 | End: 2020-06-29

## 2020-06-29 RX ORDER — MORPHINE SULFATE 4 MG/ML
4 INJECTION, SOLUTION INTRAMUSCULAR; INTRAVENOUS ONCE
Status: COMPLETED | OUTPATIENT
Start: 2020-06-29 | End: 2020-06-29

## 2020-06-29 RX ORDER — SULFAMETHOXAZOLE AND TRIMETHOPRIM 800; 160 MG/1; MG/1
1 TABLET ORAL 2 TIMES DAILY
Qty: 14 TABLET | Refills: 0 | Status: SHIPPED | OUTPATIENT
Start: 2020-06-29 | End: 2020-07-06

## 2020-06-29 RX ADMIN — IOHEXOL 100 ML: 350 INJECTION, SOLUTION INTRAVENOUS at 18:43

## 2020-06-29 RX ADMIN — ONDANSETRON 4 MG: 2 INJECTION INTRAMUSCULAR; INTRAVENOUS at 18:08

## 2020-06-29 RX ADMIN — MORPHINE SULFATE 4 MG: 4 INJECTION INTRAVENOUS at 18:08

## 2020-06-29 RX ADMIN — SODIUM CHLORIDE 1000 ML: 0.9 INJECTION, SOLUTION INTRAVENOUS at 19:59

## 2020-06-29 RX ADMIN — SODIUM CHLORIDE 1000 ML: 0.9 INJECTION, SOLUTION INTRAVENOUS at 18:08

## 2020-09-25 ENCOUNTER — HOSPITAL ENCOUNTER (EMERGENCY)
Facility: HOSPITAL | Age: 60
Discharge: HOME | End: 2020-09-25
Attending: EMERGENCY MEDICINE | Admitting: EMERGENCY MEDICINE
Payer: COMMERCIAL

## 2020-09-25 ENCOUNTER — APPOINTMENT (EMERGENCY)
Dept: CT IMAGING | Facility: HOSPITAL | Age: 60
End: 2020-09-25
Payer: COMMERCIAL

## 2020-09-25 VITALS
SYSTOLIC BLOOD PRESSURE: 138 MMHG | HEART RATE: 73 BPM | RESPIRATION RATE: 16 BRPM | TEMPERATURE: 98.7 F | OXYGEN SATURATION: 96 % | DIASTOLIC BLOOD PRESSURE: 67 MMHG

## 2020-09-25 DIAGNOSIS — L02.211 ABSCESS OF ABDOMINAL WALL: Primary | ICD-10-CM

## 2020-09-25 LAB
ALBUMIN SERPL BCP-MCNC: 3.7 G/DL (ref 3.5–5)
ALP SERPL-CCNC: 79 U/L (ref 46–116)
ALT SERPL W P-5'-P-CCNC: 30 U/L (ref 12–78)
ANION GAP SERPL CALCULATED.3IONS-SCNC: 8 MMOL/L (ref 4–13)
AST SERPL W P-5'-P-CCNC: 22 U/L (ref 5–45)
BASOPHILS # BLD AUTO: 0.04 THOUSANDS/ΜL (ref 0–0.1)
BASOPHILS NFR BLD AUTO: 0 % (ref 0–1)
BILIRUB SERPL-MCNC: 0.31 MG/DL (ref 0.2–1)
BUN SERPL-MCNC: 27 MG/DL (ref 5–25)
CALCIUM SERPL-MCNC: 8.9 MG/DL (ref 8.3–10.1)
CHLORIDE SERPL-SCNC: 101 MMOL/L (ref 100–108)
CO2 SERPL-SCNC: 28 MMOL/L (ref 21–32)
CREAT SERPL-MCNC: 0.73 MG/DL (ref 0.6–1.3)
EOSINOPHIL # BLD AUTO: 0.03 THOUSAND/ΜL (ref 0–0.61)
EOSINOPHIL NFR BLD AUTO: 0 % (ref 0–6)
ERYTHROCYTE [DISTWIDTH] IN BLOOD BY AUTOMATED COUNT: 12.7 % (ref 11.6–15.1)
GFR SERPL CREATININE-BSD FRML MDRD: 90 ML/MIN/1.73SQ M
GLUCOSE SERPL-MCNC: 104 MG/DL (ref 65–140)
HCT VFR BLD AUTO: 36.2 % (ref 34.8–46.1)
HGB BLD-MCNC: 11.8 G/DL (ref 11.5–15.4)
IMM GRANULOCYTES # BLD AUTO: 0.04 THOUSAND/UL (ref 0–0.2)
IMM GRANULOCYTES NFR BLD AUTO: 0 % (ref 0–2)
LACTATE SERPL-SCNC: 1.3 MMOL/L (ref 0.5–2)
LIPASE SERPL-CCNC: 253 U/L (ref 73–393)
LYMPHOCYTES # BLD AUTO: 2.1 THOUSANDS/ΜL (ref 0.6–4.47)
LYMPHOCYTES NFR BLD AUTO: 20 % (ref 14–44)
MCH RBC QN AUTO: 29.3 PG (ref 26.8–34.3)
MCHC RBC AUTO-ENTMCNC: 32.6 G/DL (ref 31.4–37.4)
MCV RBC AUTO: 90 FL (ref 82–98)
MONOCYTES # BLD AUTO: 0.75 THOUSAND/ΜL (ref 0.17–1.22)
MONOCYTES NFR BLD AUTO: 7 % (ref 4–12)
NEUTROPHILS # BLD AUTO: 7.77 THOUSANDS/ΜL (ref 1.85–7.62)
NEUTS SEG NFR BLD AUTO: 73 % (ref 43–75)
NRBC BLD AUTO-RTO: 0 /100 WBCS
PLATELET # BLD AUTO: 329 THOUSANDS/UL (ref 149–390)
PMV BLD AUTO: 8.7 FL (ref 8.9–12.7)
POTASSIUM SERPL-SCNC: 4.1 MMOL/L (ref 3.5–5.3)
PROT SERPL-MCNC: 7.2 G/DL (ref 6.4–8.2)
RBC # BLD AUTO: 4.03 MILLION/UL (ref 3.81–5.12)
SODIUM SERPL-SCNC: 137 MMOL/L (ref 136–145)
WBC # BLD AUTO: 10.73 THOUSAND/UL (ref 4.31–10.16)

## 2020-09-25 PROCEDURE — 83690 ASSAY OF LIPASE: CPT | Performed by: EMERGENCY MEDICINE

## 2020-09-25 PROCEDURE — 83605 ASSAY OF LACTIC ACID: CPT | Performed by: EMERGENCY MEDICINE

## 2020-09-25 PROCEDURE — 87040 BLOOD CULTURE FOR BACTERIA: CPT | Performed by: EMERGENCY MEDICINE

## 2020-09-25 PROCEDURE — 87205 SMEAR GRAM STAIN: CPT | Performed by: EMERGENCY MEDICINE

## 2020-09-25 PROCEDURE — 87077 CULTURE AEROBIC IDENTIFY: CPT | Performed by: EMERGENCY MEDICINE

## 2020-09-25 PROCEDURE — 74177 CT ABD & PELVIS W/CONTRAST: CPT

## 2020-09-25 PROCEDURE — 99284 EMERGENCY DEPT VISIT MOD MDM: CPT

## 2020-09-25 PROCEDURE — 85025 COMPLETE CBC W/AUTO DIFF WBC: CPT | Performed by: EMERGENCY MEDICINE

## 2020-09-25 PROCEDURE — 87070 CULTURE OTHR SPECIMN AEROBIC: CPT | Performed by: EMERGENCY MEDICINE

## 2020-09-25 PROCEDURE — 99284 EMERGENCY DEPT VISIT MOD MDM: CPT | Performed by: EMERGENCY MEDICINE

## 2020-09-25 PROCEDURE — 96360 HYDRATION IV INFUSION INIT: CPT

## 2020-09-25 PROCEDURE — 87186 SC STD MICRODIL/AGAR DIL: CPT | Performed by: EMERGENCY MEDICINE

## 2020-09-25 PROCEDURE — 36415 COLL VENOUS BLD VENIPUNCTURE: CPT | Performed by: EMERGENCY MEDICINE

## 2020-09-25 PROCEDURE — G1004 CDSM NDSC: HCPCS

## 2020-09-25 PROCEDURE — 80053 COMPREHEN METABOLIC PANEL: CPT | Performed by: EMERGENCY MEDICINE

## 2020-09-25 RX ORDER — SULFAMETHOXAZOLE AND TRIMETHOPRIM 800; 160 MG/1; MG/1
1 TABLET ORAL EVERY 12 HOURS
Qty: 20 TABLET | Refills: 0 | Status: SHIPPED | OUTPATIENT
Start: 2020-09-25 | End: 2020-09-25 | Stop reason: SDUPTHER

## 2020-09-25 RX ORDER — SULFAMETHOXAZOLE AND TRIMETHOPRIM 800; 160 MG/1; MG/1
1 TABLET ORAL ONCE
Status: COMPLETED | OUTPATIENT
Start: 2020-09-25 | End: 2020-09-25

## 2020-09-25 RX ORDER — SULFAMETHOXAZOLE AND TRIMETHOPRIM 800; 160 MG/1; MG/1
1 TABLET ORAL EVERY 12 HOURS
Qty: 20 TABLET | Refills: 0 | Status: SHIPPED | OUTPATIENT
Start: 2020-09-25 | End: 2020-10-05

## 2020-09-25 RX ADMIN — SODIUM CHLORIDE 1000 ML: 0.9 INJECTION, SOLUTION INTRAVENOUS at 16:34

## 2020-09-25 RX ADMIN — SULFAMETHOXAZOLE AND TRIMETHOPRIM 1 TABLET: 800; 160 TABLET ORAL at 18:32

## 2020-09-25 RX ADMIN — IOHEXOL 100 ML: 350 INJECTION, SOLUTION INTRAVENOUS at 18:04

## 2020-09-25 NOTE — DISCHARGE INSTRUCTIONS
Abdominal wall cellulitis with draining wound  Please inform your [facility] bariatric surgeon evaluating abdominal wound currently of diagnosis, range evaluation as soon as possible, bring CD and lab studies  Return immediately if worse or any new symptoms

## 2020-09-25 NOTE — ED NOTES
The wound is cleansed, debrided of foreign material as much as possible, and dressed  The patient is alerted to watch for any signs of infection (redness, pus, pain, increased swelling or fever) and call if such occurs  Home wound care instructions are provided  Tetanus vaccination status reviewed: last tetanus booster within 10 years  IV catheter discontinued intact  Site without signs and symptoms of complications  Dressing and pressure applied  Patient verbalized understanding of discharge instructions including medication administration and follow-up care  No further questions or concerns at this time          Constanza Chen RN  09/25/20 2504

## 2020-09-25 NOTE — ED PROVIDER NOTES
History  Chief Complaint   Patient presents with    Abdominal Pain     open wound under umbilicus in Nov   Open and draining since  Patient with persistent abdominal wall abscess, previously had washout in the [address] [place] [address] in June 2019, has had recurrent infections (last culture Klebsiella (11/24/19)), returns once again for infected abdominal wall abscess  Patient denies fevers or nausea or vomiting but complains of redness and pain and drainage since yesterday in the area involved  History provided by:  Patient   used: No    Abdominal Pain   Pain location: Sub umbilical   Pain quality: aching    Pain radiates to:  Does not radiate  Pain severity:  Moderate  Onset quality:  Gradual  Duration:  1 day  Timing:  Constant  Progression:  Unchanged  Chronicity:  Recurrent  Context comment:  Recurrent abdominal wall abscess and previous surgery  Relieved by:  Nothing  Worsened by:  Nothing  Ineffective treatments:  None tried  Associated symptoms: no belching, no chest pain, no chills, no constipation, no cough, no diarrhea, no dysuria, no fever, no hematuria, no nausea, no shortness of breath, no sore throat and no vomiting        Prior to Admission Medications   Prescriptions Last Dose Informant Patient Reported? Taking?    OZEMPIC, 0 25 OR 0 5 MG/DOSE, 2 MG/1 5ML SOPN   Yes No   PARoxetine (PAXIL) 10 mg tablet Unknown at Unknown time  Yes No   Sig: Take 10 mg by mouth daily   aspirin (ECOTRIN LOW STRENGTH) 81 mg EC tablet Unknown at Unknown time  Yes No   Sig: Take 81 mg by mouth daily   cyanocobalamin (VITAMIN B-12) 250 MCG tablet Unknown at Unknown time  No No   Sig: Take 1 tablet (250 mcg total) by mouth daily   ferrous sulfate 324 (65 Fe) mg Unknown at Unknown time  No No   Sig: Take 1 tablet (324 mg total) by mouth daily before breakfast   metoprolol succinate (TOPROL-XL) 100 mg 24 hr tablet Unknown at Unknown time  Yes No   Sig: Take 100 mg by mouth daily   naproxen (NAPROSYN) 375 mg tablet Not Taking at Unknown time  No No   Sig: Take 1 tablet (375 mg total) by mouth 2 (two) times a day with meals   Patient not taking: Reported on 9/25/2020   simvastatin (ZOCOR) 20 mg tablet Unknown at Unknown time  Yes No   Sig: Take 20 mg by mouth daily at bedtime      Facility-Administered Medications: None       Past Medical History:   Diagnosis Date    Depression     [facility] mellitus [address] [address] [facility]     Fistula     High cholesterol     Hypertension        Past Surgical History:   Procedure Laterality Date    BOWEL RESECTION      CHOLECYSTECTOMY LAPAROSCOPIC      HERNIA REPAIR         Family History   Problem Relation Age of Onset    Heart disease Mother      I have reviewed and agree with the history as documented  E-Cigarette/Vaping    E-Cigarette Use Never User      E-Cigarette/Vaping Substances    Nicotine No     THC No     CBD No     Flavoring No     Other No     Unknown No      Social History     Tobacco Use    Smoking status: Never Smoker    Smokeless tobacco: Never Used   Substance Use Topics    Alcohol use: Yes     Comment: rarely    Drug use: Never       Review of Systems   Constitutional: Negative for chills and fever  HENT: Negative for ear pain, hearing loss, sore throat, trouble swallowing and voice change  Eyes: Negative for pain and discharge  Respiratory: Negative for cough, shortness of breath and wheezing  Cardiovascular: Negative for chest pain and palpitations  Gastrointestinal: Positive for abdominal pain  Negative for blood in stool, constipation, diarrhea, nausea and vomiting  Genitourinary: Negative for dysuria, flank pain, frequency and hematuria  Musculoskeletal: Negative for joint swelling, neck pain and neck stiffness  Skin: Negative for rash and wound  Neurological: Negative for dizziness, seizures, syncope, facial asymmetry and headaches  Psychiatric/Behavioral: Negative for hallucinations, self-injury and suicidal ideas     All other systems reviewed and are negative  Physical Exam  Physical Exam  Vitals signs and nursing note reviewed  Constitutional:       General: She is not in acute distress  Appearance: She is well-developed  She is obese  HENT:      Head: Normocephalic and atraumatic  Right Ear: External ear normal       Left Ear: External ear normal    Eyes:      Conjunctiva/sclera: Conjunctivae normal       Pupils: Pupils are equal, round, and reactive to light  Neck:      Musculoskeletal: Normal range of motion and neck supple  Cardiovascular:      Rate and Rhythm: Normal rate and regular rhythm  Heart sounds: Normal heart sounds  No murmur  Pulmonary:      Effort: Pulmonary effort is normal       Breath sounds: Normal breath sounds  No wheezing or rales  Abdominal:      General: Bowel sounds are normal  There is no distension  Palpations: Abdomen is soft  Tenderness: There is no abdominal tenderness  There is no guarding or rebound  Comments: In the sub umbilical area there is the cellulitic area with palpable abscess  The area is red and warm with drainage of white pus  The area is localized in there is no proximal streaking  Musculoskeletal: Normal range of motion  General: No deformity  Skin:     General: Skin is warm and dry  Findings: No rash  Neurological:      General: No focal deficit present  Mental Status: She is alert and oriented to person, place, and time  Cranial Nerves: No cranial nerve deficit     Psychiatric:         Behavior: Behavior normal          Vital Signs  ED Triage Vitals [09/25/20 1613]   Temperature Pulse Respirations Blood Pressure SpO2   98 7 °F (37 1 °C) 76 18 128/75 98 %      Temp Source Heart Rate Source Patient Position - Orthostatic VS BP Location FiO2 (%)   Temporal Monitor -- Left arm --      Pain Score       5           Vitals:    09/25/20 1613   BP: 128/75   Pulse: 76         Visual Acuity      ED Medications  Medications sodium chloride 0 9 % bolus 1,000 mL (1,000 mL Intravenous New Bag 9/25/20 1634)       Diagnostic Studies  Results Reviewed     Procedure Component Value Units Date/Time    Blood culture #1 [[id number]] Collected:  09/25/20 1718    Lab Status: In process Specimen:  Blood from Arm, Right Updated:  09/25/20 1721    Lactic acid, plasma [[id number]]  (Normal) Collected:  09/25/20 1632    Lab Status:  Final result Specimen:  Blood from Arm, Left Updated:  09/25/20 1700     LACTIC ACID 1 3 mmol/L     Narrative:       Result may be elevated if tourniquet was used during collection      Comprehensive metabolic panel [[id number]]  (Abnormal) Collected:  09/25/20 1632    Lab Status:  Final result Specimen:  Blood from Arm, Left Updated:  09/25/20 1657     Sodium 137 mmol/L      Potassium 4 1 mmol/L      [place] 101 mmol/L      CO2 28 mmol/L      ANION GAP 8 mmol/L      BUN 27 mg/dL      Creatinine 0 73 mg/dL      Glucose 104 mg/dL      Calcium 8 9 mg/dL      AST 22 U/L      ALT 30 U/L      Alkaline Phosphatase 79 U/L      Total Protein 7 2 g/dL      Albumin 3 7 g/dL      Total Bilirubin 0 31 mg/dL      eGFR 90 ml/min/1 73sq m     Narrative:       [place] guidelines for Chronic Kidney Disease (CKD):     Stage 1 with normal or high GFR (GFR > 90 mL/min/1 73 square meters)    Stage 2 Mild CKD (GFR = 60-89 mL/min/1 73 square meters)    Stage 3A Moderate CKD (GFR = 45-59 mL/min/1 73 square meters)    Stage 3B Moderate CKD (GFR = 30-44 mL/min/1 73 square meters)    Stage 4 Severe CKD (GFR = 15-29 mL/min/1 73 square meters)    Stage 5 End Stage CKD (GFR <15 mL/min/1 73 square meters)  Note: GFR calculation is accurate only with a steady state creatinine    Lipase [[id number]]  (Normal) Collected:  09/25/20 1632    Lab Status:  Final result Specimen:  Blood from Arm, Left Updated:  09/25/20 1657     Lipase 253 u/L     CBC and differential [[id number]]  (Abnormal) Collected:  09/25/20 1632    Lab Status: Final result Specimen:  Blood from Arm, Left Updated:  09/25/20 1642     WBC 10 73 Thousand/uL      RBC 4 03 Million/uL      Hemoglobin 11 8 g/dL      Hematocrit 36 2 %      MCV 90 fL      MCH 29 3 pg      MCHC 32 6 g/dL      RDW 12 7 %      MPV 8 7 fL      Platelets 076 Thousands/uL      nRBC 0 /100 WBCs      Neutrophils Relative 73 %      Immat GRANS % 0 %      Lymphocytes Relative 20 %      Monocytes Relative 7 %      Eosinophils Relative 0 %      Basophils Relative 0 %      Neutrophils Absolute 7 77 Thousands/µL      Immature Grans Absolute 0 04 Thousand/uL      Lymphocytes Absolute 2 10 Thousands/µL      Monocytes Absolute 0 75 Thousand/µL      Eosinophils Absolute 0 03 Thousand/µL      Basophils Absolute 0 04 Thousands/µL     Blood culture #2 [[id number]] Collected:  09/25/20 1632    Lab Status: In process Specimen:  Blood from Arm, Right Updated:  09/25/20 1641                 CT abdomen pelvis w contrast    (Results Pending)              Procedures  Procedures         ED Course  ED Course as of Sep 25 1736   Fri Sep 25, 2020   1735 Signed out to Dr Chayo Rubio pending CT abdomen pelvis                                          MDM  Number of Diagnoses or Management Options     Amount and/or Complexity of Data Reviewed  Clinical lab tests: ordered and reviewed  Tests in the radiology section of CPT®: ordered and reviewed  Decide to obtain previous medical records or to obtain history from someone other than the patient: yes  Review and summarize past medical records: yes  Independent visualization of images, tracings, or specimens: yes        Disposition  Final diagnoses:   None     ED Disposition     None      Follow-up Information    None         Patient's Medications   Discharge Prescriptions    No medications on file     No discharge procedures on file      PDMP Review     None          ED Provider  Electronically Signed by           Romeo Ohara MD  09/25/20 1736

## 2020-09-29 ENCOUNTER — TELEPHONE (OUTPATIENT)
Dept: EMERGENCY DEPT | Facility: HOSPITAL | Age: 60
End: 2020-09-29

## 2020-09-29 ENCOUNTER — HOSPITAL ENCOUNTER (EMERGENCY)
Facility: HOSPITAL | Age: 60
Discharge: HOME | End: 2020-09-29
Attending: EMERGENCY MEDICINE | Admitting: EMERGENCY MEDICINE
Payer: COMMERCIAL

## 2020-09-29 VITALS
RESPIRATION RATE: 18 BRPM | OXYGEN SATURATION: 96 % | WEIGHT: 281.75 LBS | BODY MASS INDEX: 48.1 KG/M2 | HEART RATE: 72 BPM | TEMPERATURE: 98.4 F | DIASTOLIC BLOOD PRESSURE: 68 MMHG | HEIGHT: 64 IN | SYSTOLIC BLOOD PRESSURE: 142 MMHG

## 2020-09-29 DIAGNOSIS — L02.211 ABSCESS OF ABDOMINAL WALL: Primary | ICD-10-CM

## 2020-09-29 LAB
BACTERIA WND AEROBE CULT: ABNORMAL
GRAM STN SPEC: ABNORMAL

## 2020-09-29 PROCEDURE — 10061 I&D ABSCESS COMP/MULTIPLE: CPT | Performed by: PHYSICIAN ASSISTANT

## 2020-09-29 PROCEDURE — 99284 EMERGENCY DEPT VISIT MOD MDM: CPT

## 2020-09-29 PROCEDURE — 99284 EMERGENCY DEPT VISIT MOD MDM: CPT | Performed by: PHYSICIAN ASSISTANT

## 2020-09-29 RX ORDER — LEVOFLOXACIN 750 MG/1
750 TABLET ORAL ONCE
Status: COMPLETED | OUTPATIENT
Start: 2020-09-29 | End: 2020-09-29

## 2020-09-29 RX ORDER — LEVOFLOXACIN 500 MG/1
500 TABLET, FILM COATED ORAL DAILY
Qty: 7 TABLET | Refills: 0 | Status: SHIPPED | OUTPATIENT
Start: 2020-09-29 | End: 2020-10-06

## 2020-09-29 RX ORDER — LIDOCAINE HYDROCHLORIDE 10 MG/ML
10 INJECTION, SOLUTION EPIDURAL; INFILTRATION; INTRACAUDAL; PERINEURAL ONCE
Status: COMPLETED | OUTPATIENT
Start: 2020-09-29 | End: 2020-09-29

## 2020-09-29 RX ADMIN — LIDOCAINE HYDROCHLORIDE 10 ML: 10 INJECTION, SOLUTION EPIDURAL; INFILTRATION; INTRACAUDAL; PERINEURAL at 16:53

## 2020-09-29 RX ADMIN — LEVOFLOXACIN 750 MG: 750 TABLET, FILM COATED ORAL at 17:18

## 2020-09-29 NOTE — TELEPHONE ENCOUNTER
The note regarding patient being treated appropriately referred to the 1st set of cultures that we were sent  Epic EMR has inappropriately taged this note to all the patient's cultures  Based on the subsequent cultures patient will need additional antibiotics  I did attempt to contact the patient via phone and left a message for her to call back  I will also send a prescription to the patient's pharmacy for Levaquin based on the sensitivities and the patient's allergies    Patient should also continue with her Bactrim that she was previously prescribed

## 2020-09-29 NOTE — DISCHARGE INSTRUCTIONS
Please continue Bactrim as prescribed  Please  Levaquin intake as prescribed  Please follow-up with your surgeon for wound check in 2-3 days  Please return to the emergency department any new or worsening symptoms

## 2020-09-29 NOTE — ED PROVIDER NOTES
History  Chief Complaint   Patient presents with    Abdominal Problem     pt seen 5 days ago in ED per draining abdominal abcess  pt stating abcess stopped draining and experiencing shooting pain in wound area since this morning  pt instucted by pcp to return to ED per further evaluation  denies fevers/sob/cough/travel/n/v/d     35-year-old female presents to the emergency department for evaluation of abdominal abscess  She reports this originally started with a insect bite approximately year ago  Previously had washout in the [address] [place] [address] Way in June 2019  Patient states since this time she has had recurrent abscess and infection in this area  Patient was seen in the emergency department on 09/25/2020 was started on Bactrim  Patient had some resistant to this showed on 1 culture and was prescribed Levaquin today, patient has not picked this medication up yet  She reports on 9/25 abscess was draining however since has scabbed over and stopped draining  Patient reports area around seems to be more reddened  She denies any fevers or chills  She denies any nausea or vomiting  She denies change in appetite  Per CT scan on 09/25/2020  2 5 x 3 7 x 2 7 cm subdermal fluid and gas containing collection at the umbilicus (series 2, image 78) in keeping with clinically suspected abscess  Prior to Admission Medications   Prescriptions Last Dose Informant Patient Reported? Taking?    OZEMPIC, 0 25 OR 0 5 MG/DOSE, 2 MG/1 5ML SOPN   Yes Yes   PARoxetine (PAXIL) 10 mg tablet   Yes Yes   Sig: Take 10 mg by mouth daily   aspirin (ECOTRIN LOW STRENGTH) 81 mg EC tablet   Yes Yes   Sig: Take 81 mg by mouth daily   cyanocobalamin (VITAMIN B-12) 250 MCG tablet   No Yes   Sig: Take 1 tablet (250 mcg total) by mouth daily   ferrous sulfate 324 (65 Fe) mg   No Yes   Sig: Take 1 tablet (324 mg total) by mouth daily before breakfast   levofloxacin (LEVAQUIN) 500 mg tablet   No Yes   Sig: Take 1 tablet (500 mg total) by mouth daily for 7 days   metoprolol succinate (TOPROL-XL) 100 mg 24 hr tablet   Yes Yes   Sig: Take 100 mg by mouth daily   naproxen (NAPROSYN) 375 mg tablet   No No   Sig: Take 1 tablet (375 mg total) by mouth 2 (two) times a day with meals   Patient not taking: Reported on 9/25/2020   simvastatin (ZOCOR) 20 mg tablet   Yes Yes   Sig: Take 20 mg by mouth daily at bedtime   sulfamethoxazole-trimethoprim (BACTRIM DS) 800-160 mg per tablet   No Yes   Sig: Take 1 tablet by mouth every 12 (twelve) hours for 10 days smx-tmp DS (BACTRIM) 800-160 mg tabs (1tab q12 D10)      Facility-Administered Medications: None       Past Medical History:   Diagnosis Date    Depression     Diabetes mellitus ([facility] [address] )     Fistula     High cholesterol     Hypertension        Past Surgical History:   Procedure Laterality Date    BOWEL RESECTION      CHOLECYSTECTOMY LAPAROSCOPIC      HERNIA REPAIR         Family History   Problem Relation Age of Onset    Heart disease Mother      I have reviewed and agree with the history as documented  E-Cigarette/Vaping    E-Cigarette Use Never User      E-Cigarette/Vaping Substances    Nicotine No     THC No     CBD No     Flavoring No     Other No     Unknown No      Social History     Tobacco Use    Smoking status: Never Smoker    Smokeless tobacco: Never Used   Substance Use Topics    Alcohol use: Yes     Comment: rarely    Drug use: Never       Review of Systems   Constitutional: Negative for appetite change, chills, diaphoresis, fatigue and fever  HENT: Negative  Respiratory: Negative  Cardiovascular: Negative  Gastrointestinal: Negative  Genitourinary: Negative  Musculoskeletal: Negative  Skin: Positive for color change  Abscess   Neurological: Negative  All other systems reviewed and are negative  Physical Exam  Physical Exam  Vitals signs and nursing note reviewed  Constitutional:       General: She is not in acute distress       Appearance: Normal appearance  She is obese  She is not ill-appearing, toxic-appearing or diaphoretic  HENT:      Head: Normocephalic and atraumatic  Nose: Nose normal  No congestion or rhinorrhea  Mouth/Throat:      Mouth: Mucous membranes are moist       Pharynx: Oropharynx is clear  No oropharyngeal exudate or posterior oropharyngeal erythema  Eyes:      Extraocular Movements: Extraocular movements intact  Conjunctiva/sclera: Conjunctivae normal       Pupils: Pupils are equal, round, and reactive to light  Neck:      Musculoskeletal: Normal range of motion and neck supple  No muscular tenderness  Cardiovascular:      Rate and Rhythm: Normal rate and regular rhythm  Pulmonary:      Effort: Pulmonary effort is normal  No respiratory distress  Breath sounds: Normal breath sounds  No stridor  No wheezing, rhonchi or rales  Chest:      Chest wall: No tenderness  Abdominal:      General: Abdomen is flat  Bowel sounds are normal  There is no distension  Palpations: Abdomen is soft  Tenderness: There is abdominal tenderness (Around abscess)  There is no guarding or rebound  Negative signs include Sigala's sign, Rovsing's sign, McBurney's sign, psoas sign and obturator sign  Hernia: A hernia is present  Hernia is present in the ventral area  Musculoskeletal: Normal range of motion  General: No swelling  Right lower leg: No edema  Left lower leg: No edema  Lymphadenopathy:      Cervical: No cervical adenopathy  Skin:     General: Skin is warm and dry  Capillary Refill: Capillary refill takes less than 2 seconds  Coloration: Skin is not pale  Findings: Abscess and erythema present  No bruising  Comments: Abscess noted in the sub-umbilical area  Noted area of fluctuance  Small amount of purulent drainage noted at the bottom of the abscess, top of abscess appears to be closed over with no current drainage  Surrounding area is warm and red   No streaking away from this area  Neurological:      General: No focal deficit present  Mental Status: She is alert and oriented to person, place, and time  Motor: No weakness  Psychiatric:         Mood and Affect: Mood normal          Behavior: Behavior normal          Thought Content: Thought content normal          Judgment: Judgment normal          Vital Signs  ED Triage Vitals [09/29/20 1625]   Temperature Pulse Respirations Blood Pressure SpO2   98 4 °F (36 9 °C) 72 18 142/68 96 %      Temp src Heart Rate Source Patient Position - Orthostatic VS BP Location FiO2 (%)   -- Monitor Lying Left arm --      Pain Score       --           Vitals:    09/29/20 1625   BP: 142/68   Pulse: 72   Patient Position - Orthostatic VS: Lying         Visual Acuity      ED Medications  Medications   lidocaine (PF) (XYLOCAINE-MPF) 1 % injection 10 mL (10 mL Infiltration Given 9/29/20 1653)   levofloxacin (LEVAQUIN) tablet 750 mg (750 mg Oral Given 9/29/20 1718)       Diagnostic Studies  Results Reviewed     None                 No orders to display              Procedures  Incision and drain    Date/Time: 9/29/2020 4:50 PM  Performed by: Cheryl Chaudhry PA-C  Authorized by: Cheryl Chaudhry PA-C     Patient location:  ED  Other Assisting Provider: Yes (comment) Margarita Angel RN)    Consent:     Consent obtained:  Verbal    Consent given by:  Patient    Risks discussed:  Bleeding, incomplete drainage, pain, infection and damage to other organs  Universal protocol:     Patient identity confirmed:  Verbally with patient and arm band  Location:     Type:  Abscess    Size:  2 cm    Location:  Trunk    Trunk location:  Abdomen  Pre-procedure details:     Skin preparation:  Betadine  Anesthesia (see MAR for exact dosages):      Anesthesia method:  Local infiltration    Local anesthetic:  Lidocaine 1% w/o epi  Procedure details:     Complexity:  Simple    Incision types:  Stab incision    Scalpel blade:  11    Approach:  Open    Incision depth:  Subcutaneous    Wound management:  Probed and deloculated, irrigated with saline and extensive cleaning    Drainage:  Bloody and purulent    Drainage amount: Moderate    Wound treatment:  Wound left open    Packing materials:  None  Post-procedure details:     Patient tolerance of procedure: Tolerated well, no immediate complications  Comments:      Area was bandaged  ED Course  ED Course as of Sep 29 1839   Tue Sep 29, 2020   1709 Incision and drainage performed  Moderate amount purulent drainage as well as blood expressed  Patient tolerated procedure well  Patient is currently on Bactrim  Levaquin was sent to patient's pharmacy  Give 1st dose of Levaquin today as patient is unable to get pharmacy  Patient was educated results and findings  She was educated on symptomatic treatment in the importance of follow-up with her surgeon  Patient was educated on symptoms that require prompt return to the ED for further evaluation verbalized understanding  She agreed with this treatment plan, remained well ED and was discharged home  SBIRT 20yo+      Most Recent Value   SBIRT (24 yo +)   In order to provide better care to our patients, we are screening all of our patients for alcohol and drug use  Would it be okay to ask you these screening questions? Yes Filed at: 09/29/2020 1654   Initial Alcohol Screen: US AUDIT-C    1  How often do you have a drink containing alcohol?  0 Filed at: 09/29/2020 1654   2  How many drinks containing alcohol do you have on a typical day you are drinking? 0 Filed at: 09/29/2020 1654   3b  FEMALE Any Age, or MALE 65+: How often do you have 4 or more drinks on one occassion? 0 Filed at: 09/29/2020 1654   Audit-C Score  0 Filed at: 09/29/2020 1654   TANIA: How many times in the past year have you    Used an illegal drug or used a prescription medication for non-medical reasons?   Never Filed at: 09/29/2020 1654 MDM  Number of Diagnoses or Management Options  Abscess of abdominal wall: new and does not require workup  Diagnosis management comments: 61-year-old female presents to the emergency department for evaluation abscess  Vitals and medical record reviewed  Patient afebrile  Normal heart rate  Patient currently on Bactrim additionally prescribed Levaquin today due to resistance noted on wound culture  Presents today due to abscess closing becoming more red  Please see details of abscess and physical exam   Abscess was I&D in the ED  Thoroughly irrigated  Patient received 1st dose of Levaquin in the emergency department  She was educated importance of follow-up with surgery for for evaluation  Patient agreed to this treatment plan, she is educated on symptoms that require prompt return to the ED for further evaluation verbalized understanding  She remained well ED and was discharged home  Amount and/or Complexity of Data Reviewed  Review and summarize past medical records: yes        Disposition  Final diagnoses:   Abscess of abdominal wall     Time reflects when diagnosis was documented in both MDM as applicable and the Disposition within this note     Time User Action Codes Description Comment    9/29/2020  5:11 PM Malo Serum Add [L02 211] Abscess of abdominal wall       ED Disposition     ED Disposition Condition Date/Time Comment    Discharge Stable Tue Sep 29, 2020  5:10 PM Shawn Helms discharge to home/self care              Follow-up Information     Follow up With Specialties Details Why Contact Info    Tim Hein MD Family Medicine In 1 week As needed, If symptoms worsen [address] [place] [address] [id number] [place] [address]  In 2 days For wound re-check           Discharge Medication List as of 9/29/2020  5:11 PM      CONTINUE these medications which have NOT CHANGED    Details   aspirin (ECOTRIN LOW STRENGTH) 81 mg EC tablet Take 81 mg by mouth daily, Historical Med      cyanocobalamin (VITAMIN B-12) 250 MCG tablet Take 1 tablet (250 mcg total) by mouth daily, Starting Thu 12/5/2019, Normal      ferrous sulfate 324 (65 Fe) mg Take 1 tablet (324 mg total) by mouth daily before breakfast, Starting Thu 12/5/2019, Normal      levofloxacin (LEVAQUIN) 500 mg tablet Take 1 tablet (500 mg total) by mouth daily for 7 days, Starting Tue 9/29/2020, Until Tue 10/6/2020, Normal      metoprolol succinate (TOPROL-XL) 100 mg 24 hr tablet Take 100 mg by mouth daily, Starting Tue 10/1/2019, Historical Med      OZEMPIC, 0 25 OR 0 5 MG/DOSE, 2 MG/1 5ML SOPN Starting Wed 1/29/2020, Historical Med      PARoxetine (PAXIL) 10 mg tablet Take 10 mg by mouth daily, Historical Med      simvastatin (ZOCOR) 20 mg tablet Take 20 mg by mouth daily at bedtime, Historical Med      sulfamethoxazole-trimethoprim (BACTRIM DS) 800-160 mg per tablet Take 1 tablet by mouth every 12 (twelve) hours for 10 days smx-tmp DS (BACTRIM) 800-160 mg tabs (1tab q12 D10), Starting Fri 9/25/2020, Until Mon 10/5/2020, Normal      naproxen (NAPROSYN) 375 mg tablet Take 1 tablet (375 mg total) by mouth 2 (two) times a day with meals, Starting Mon 3/23/2020, Normal           No discharge procedures on file      PDMP Review     None          ED Provider  Electronically Signed by           Thanh Khan PA-C  09/29/20 8329

## 2020-09-30 LAB
BACTERIA BLD CULT: NORMAL
BACTERIA BLD CULT: NORMAL

## 2021-03-26 DIAGNOSIS — Z23 ENCOUNTER FOR IMMUNIZATION: ICD-10-CM

## 2021-05-25 ENCOUNTER — OFFICE VISIT (OUTPATIENT)
Dept: URGENT CARE | Facility: CLINIC | Age: 61
End: 2021-05-25
Payer: COMMERCIAL

## 2021-05-25 VITALS
TEMPERATURE: 97.7 F | RESPIRATION RATE: 18 BRPM | WEIGHT: 280 LBS | HEART RATE: 81 BPM | BODY MASS INDEX: 46.65 KG/M2 | OXYGEN SATURATION: 97 % | HEIGHT: 65 IN

## 2021-05-25 DIAGNOSIS — R21 RASH: Primary | ICD-10-CM

## 2021-05-25 PROCEDURE — 99213 OFFICE O/P EST LOW 20 MIN: CPT | Performed by: PHYSICIAN ASSISTANT

## 2021-05-25 RX ORDER — PHENOL 1.4 %
20 AEROSOL, SPRAY (ML) MUCOUS MEMBRANE
COMMUNITY

## 2021-05-25 RX ORDER — CEPHALEXIN 500 MG/1
500 CAPSULE ORAL EVERY 12 HOURS SCHEDULED
Qty: 14 CAPSULE | Refills: 0 | Status: SHIPPED | OUTPATIENT
Start: 2021-05-25 | End: 2021-06-01

## 2021-05-25 RX ORDER — CONJUGATED ESTROGENS 0.62 MG/G
CREAM VAGINAL
COMMUNITY
Start: 2021-04-05 | End: 2021-08-22 | Stop reason: HOSPADM

## 2021-05-25 RX ORDER — METFORMIN HYDROCHLORIDE 750 MG/1
750 TABLET, EXTENDED RELEASE ORAL
COMMUNITY
Start: 2021-04-12 | End: 2022-04-12

## 2021-05-25 NOTE — PATIENT INSTRUCTIONS
Take antibiotic as prescribed  Complete full dose of antibiotics even if symptoms begin to improve or resolve  May use OTC Tylenol for fever  Observe for signs of worsening infection including increased swelling, redness, pain, discharge, fever or chills, or persistent symptoms  Your symptoms should begin to improve over the next couple days  Follow-up with your PCP in 3-5 days if symptoms worsen or do not improve  Go to ER if symptoms become severe  Acute Rash   WHAT YOU NEED TO KNOW:   A rash is irritated, red, or itchy skin or mucus membranes, such as the lining of your nose or throat  Acute means the rash starts suddenly, worsens quickly, and lasts a short time  Common causes include a disease or infection, a reaction to something you are allergic to, or certain medicines  DISCHARGE INSTRUCTIONS:   Return to the emergency department if:   · You have sudden trouble breathing or chest pain  · You are vomiting, have a headache or muscle aches, and your throat hurts  Call your doctor or dermatologist if:   · You have a fever  · You get open wounds from scratching your skin, or you have a wound that is red, swollen, or painful  · Your rash lasts longer than 3 months  · You have swelling or pain in your joints  · You have questions or concerns about your condition or care  Medicines:  If your rash does not go away on its own, you may need the following medicines:  · Antihistamines  may be given to help decrease itching  · Steroids  may be given to decrease inflammation  · Antibiotics  help fight or prevent a bacterial infection  · Take your medicine as directed  Contact your healthcare provider if you think your medicine is not helping or if you have side effects  Tell him of her if you are allergic to any medicine  Keep a list of the medicines, vitamins, and herbs you take  Include the amounts, and when and why you take them   Bring the list or the pill bottles to follow-up visits  Carry your medicine list with you in case of an emergency  Prevent a rash or care for your skin when you have a rash:  Dry skin can lead to more problems  Do not scratch your skin if it itches  You may cause a skin infection by scratching  The following may prevent dry skin, and help your skin look better:  · Help soothe your rash  Apply thick cream lotions or petroleum jelly  Cool compresses may also soothe your skin  Apply a cool compress or a cool, wet towel, and then cover it with a dry towel  · Use lukewarm water when you bathe  Hot water may damage your skin more  Pat your skin dry  Do not rub your skin with a towel  · Use detergents, soaps, shampoos, and bubble baths  made for sensitive skin  · Wear clothes made of cotton instead of nylon or wool  Cotton is softer, so it will not hurt your skin as much  Follow up with your healthcare providers as directed:  A dermatologist may help find the cause of your rash or help plan or change treatment  A dietitian may help with meal planning if you have a food allergy  Write down your questions so you remember to ask them during your visits  © Copyright [address] Information is for End User's use only and may not be sold, redistributed or otherwise used for commercial purposes  All illustrations and images included in CareNotes® are the copyrighted property of [employer]  or Nathaly Sy  The above information is an  only  It is not intended as medical advice for individual conditions or treatments  Talk to your doctor, nurse or pharmacist before following any medical regimen to see if it is safe and effective for you

## 2021-08-20 ENCOUNTER — HOSPITAL ENCOUNTER (INPATIENT)
Facility: HOSPITAL | Age: 61
LOS: 2 days | Discharge: HOME | DRG: 389 | End: 2021-08-22
Attending: EMERGENCY MEDICINE | Admitting: FAMILY MEDICINE
Payer: COMMERCIAL

## 2021-08-20 ENCOUNTER — APPOINTMENT (EMERGENCY)
Dept: CT IMAGING | Facility: HOSPITAL | Age: 61
DRG: 389 | End: 2021-08-20
Payer: COMMERCIAL

## 2021-08-20 DIAGNOSIS — R10.9 ABDOMINAL PAIN: Primary | ICD-10-CM

## 2021-08-20 DIAGNOSIS — K56.609 SBO (SMALL BOWEL OBSTRUCTION) (HCC): ICD-10-CM

## 2021-08-20 DIAGNOSIS — K56.609 SMALL BOWEL OBSTRUCTION (HCC): ICD-10-CM

## 2021-08-20 LAB
ALBUMIN SERPL BCP-MCNC: 3.7 G/DL (ref 3.5–5)
ALP SERPL-CCNC: 87 U/L (ref 46–116)
ALT SERPL W P-5'-P-CCNC: 28 U/L (ref 12–78)
ANION GAP SERPL CALCULATED.3IONS-SCNC: 12 MMOL/L (ref 4–13)
AST SERPL W P-5'-P-CCNC: 16 U/L (ref 5–45)
BASOPHILS # BLD AUTO: 0.04 THOUSANDS/ΜL (ref 0–0.1)
BASOPHILS NFR BLD AUTO: 0 % (ref 0–1)
BILIRUB SERPL-MCNC: 0.5 MG/DL (ref 0.2–1)
BILIRUB UR QL STRIP: ABNORMAL
BUN SERPL-MCNC: 24 MG/DL (ref 5–25)
CALCIUM SERPL-MCNC: 9.2 MG/DL (ref 8.3–10.1)
CHLORIDE SERPL-SCNC: 103 MMOL/L (ref 100–108)
CLARITY UR: CLEAR
CO2 SERPL-SCNC: 25 MMOL/L (ref 21–32)
COLOR UR: YELLOW
CREAT SERPL-MCNC: 0.88 MG/DL (ref 0.6–1.3)
EOSINOPHIL # BLD AUTO: 0.06 THOUSAND/ΜL (ref 0–0.61)
EOSINOPHIL NFR BLD AUTO: 0 % (ref 0–6)
ERYTHROCYTE [DISTWIDTH] IN BLOOD BY AUTOMATED COUNT: 13.1 % (ref 11.6–15.1)
GFR SERPL CREATININE-BSD FRML MDRD: 71 ML/MIN/1.73SQ M
GLUCOSE SERPL-MCNC: 153 MG/DL (ref 65–140)
GLUCOSE UR STRIP-MCNC: NEGATIVE MG/DL
HCT VFR BLD AUTO: 39 % (ref 34.8–46.1)
HGB BLD-MCNC: 13.2 G/DL (ref 11.5–15.4)
HGB UR QL STRIP.AUTO: NEGATIVE
IMM GRANULOCYTES # BLD AUTO: 0.05 THOUSAND/UL (ref 0–0.2)
IMM GRANULOCYTES NFR BLD AUTO: 0 % (ref 0–2)
KETONES UR STRIP-MCNC: NEGATIVE MG/DL
LACTATE SERPL-SCNC: 2.1 MMOL/L (ref 0.5–2)
LACTATE SERPL-SCNC: 3 MMOL/L (ref 0.5–2)
LEUKOCYTE ESTERASE UR QL STRIP: NEGATIVE
LIPASE SERPL-CCNC: 470 U/L (ref 73–393)
LYMPHOCYTES # BLD AUTO: 2.06 THOUSANDS/ΜL (ref 0.6–4.47)
LYMPHOCYTES NFR BLD AUTO: 15 % (ref 14–44)
MCH RBC QN AUTO: 29.9 PG (ref 26.8–34.3)
MCHC RBC AUTO-ENTMCNC: 33.8 G/DL (ref 31.4–37.4)
MCV RBC AUTO: 88 FL (ref 82–98)
MONOCYTES # BLD AUTO: 0.93 THOUSAND/ΜL (ref 0.17–1.22)
MONOCYTES NFR BLD AUTO: 7 % (ref 4–12)
NEUTROPHILS # BLD AUTO: 10.68 THOUSANDS/ΜL (ref 1.85–7.62)
NEUTS SEG NFR BLD AUTO: 78 % (ref 43–75)
NITRITE UR QL STRIP: NEGATIVE
NRBC BLD AUTO-RTO: 0 /100 WBCS
PH UR STRIP.AUTO: 5.5 [PH]
PLATELET # BLD AUTO: 360 THOUSANDS/UL (ref 149–390)
PMV BLD AUTO: 8.6 FL (ref 8.9–12.7)
POTASSIUM SERPL-SCNC: 4 MMOL/L (ref 3.5–5.3)
PROT SERPL-MCNC: 7.2 G/DL (ref 6.4–8.2)
PROT UR STRIP-MCNC: NEGATIVE MG/DL
RBC # BLD AUTO: 4.41 MILLION/UL (ref 3.81–5.12)
SODIUM SERPL-SCNC: 140 MMOL/L (ref 136–145)
SP GR UR STRIP.AUTO: 1.01 (ref 1–1.03)
UROBILINOGEN UR QL STRIP.AUTO: 0.2 E.U./DL
WBC # BLD AUTO: 13.82 THOUSAND/UL (ref 4.31–10.16)

## 2021-08-20 PROCEDURE — 96375 TX/PRO/DX INJ NEW DRUG ADDON: CPT

## 2021-08-20 PROCEDURE — 99223 1ST HOSP IP/OBS HIGH 75: CPT | Performed by: NURSE PRACTITIONER

## 2021-08-20 PROCEDURE — 83690 ASSAY OF LIPASE: CPT | Performed by: EMERGENCY MEDICINE

## 2021-08-20 PROCEDURE — 96374 THER/PROPH/DIAG INJ IV PUSH: CPT

## 2021-08-20 PROCEDURE — 81003 URINALYSIS AUTO W/O SCOPE: CPT | Performed by: EMERGENCY MEDICINE

## 2021-08-20 PROCEDURE — 36415 COLL VENOUS BLD VENIPUNCTURE: CPT | Performed by: EMERGENCY MEDICINE

## 2021-08-20 PROCEDURE — 83605 ASSAY OF LACTIC ACID: CPT | Performed by: EMERGENCY MEDICINE

## 2021-08-20 PROCEDURE — 74177 CT ABD & PELVIS W/CONTRAST: CPT

## 2021-08-20 PROCEDURE — 99285 EMERGENCY DEPT VISIT HI MDM: CPT | Performed by: EMERGENCY MEDICINE

## 2021-08-20 PROCEDURE — 80053 COMPREHEN METABOLIC PANEL: CPT | Performed by: EMERGENCY MEDICINE

## 2021-08-20 PROCEDURE — 85025 COMPLETE CBC W/AUTO DIFF WBC: CPT | Performed by: EMERGENCY MEDICINE

## 2021-08-20 PROCEDURE — 99285 EMERGENCY DEPT VISIT HI MDM: CPT

## 2021-08-20 PROCEDURE — 87040 BLOOD CULTURE FOR BACTERIA: CPT | Performed by: EMERGENCY MEDICINE

## 2021-08-20 PROCEDURE — 96361 HYDRATE IV INFUSION ADD-ON: CPT

## 2021-08-20 RX ORDER — ONDANSETRON 2 MG/ML
4 INJECTION INTRAMUSCULAR; INTRAVENOUS ONCE
Status: COMPLETED | OUTPATIENT
Start: 2021-08-20 | End: 2021-08-20

## 2021-08-20 RX ORDER — ACETAMINOPHEN 325 MG/1
650 TABLET ORAL EVERY 6 HOURS PRN
Status: DISCONTINUED | OUTPATIENT
Start: 2021-08-20 | End: 2021-08-21

## 2021-08-20 RX ORDER — SODIUM CHLORIDE 9 MG/ML
100 INJECTION, SOLUTION INTRAVENOUS CONTINUOUS
Status: DISCONTINUED | OUTPATIENT
Start: 2021-08-20 | End: 2021-08-22 | Stop reason: HOSPADM

## 2021-08-20 RX ORDER — SODIUM CHLORIDE 9 MG/ML
175 INJECTION, SOLUTION INTRAVENOUS CONTINUOUS
Status: DISCONTINUED | OUTPATIENT
Start: 2021-08-20 | End: 2021-08-20

## 2021-08-20 RX ORDER — LEVOFLOXACIN 5 MG/ML
750 INJECTION, SOLUTION INTRAVENOUS ONCE
Status: COMPLETED | OUTPATIENT
Start: 2021-08-20 | End: 2021-08-20

## 2021-08-20 RX ORDER — ONDANSETRON 2 MG/ML
4 INJECTION INTRAMUSCULAR; INTRAVENOUS EVERY 6 HOURS PRN
Status: DISCONTINUED | OUTPATIENT
Start: 2021-08-20 | End: 2021-08-22 | Stop reason: HOSPADM

## 2021-08-20 RX ORDER — HEPARIN SODIUM 5000 [USP'U]/ML
5000 INJECTION, SOLUTION INTRAVENOUS; SUBCUTANEOUS EVERY 8 HOURS SCHEDULED
Status: DISCONTINUED | OUTPATIENT
Start: 2021-08-21 | End: 2021-08-22 | Stop reason: HOSPADM

## 2021-08-20 RX ADMIN — ONDANSETRON 4 MG: 2 INJECTION INTRAMUSCULAR; INTRAVENOUS at 19:06

## 2021-08-20 RX ADMIN — SODIUM CHLORIDE, SODIUM LACTATE, POTASSIUM CHLORIDE, AND CALCIUM CHLORIDE 1000 ML: .6; .31; .03; .02 INJECTION, SOLUTION INTRAVENOUS at 20:59

## 2021-08-20 RX ADMIN — SODIUM CHLORIDE, SODIUM LACTATE, POTASSIUM CHLORIDE, AND CALCIUM CHLORIDE 1000 ML: .6; .31; .03; .02 INJECTION, SOLUTION INTRAVENOUS at 23:23

## 2021-08-20 RX ADMIN — IOHEXOL 100 ML: 350 INJECTION, SOLUTION INTRAVENOUS at 20:29

## 2021-08-20 RX ADMIN — SODIUM CHLORIDE, SODIUM LACTATE, POTASSIUM CHLORIDE, AND CALCIUM CHLORIDE 1000 ML: .6; .31; .03; .02 INJECTION, SOLUTION INTRAVENOUS at 21:54

## 2021-08-20 RX ADMIN — LEVOFLOXACIN 750 MG: 5 INJECTION, SOLUTION INTRAVENOUS at 21:54

## 2021-08-20 RX ADMIN — SODIUM CHLORIDE 175 ML/HR: 0.9 INJECTION, SOLUTION INTRAVENOUS at 19:05

## 2021-08-20 RX ADMIN — METRONIDAZOLE 500 MG: 500 INJECTION, SOLUTION INTRAVENOUS at 21:08

## 2021-08-20 NOTE — ED PROVIDER NOTES
History  Chief Complaint   Patient presents with    Abdominal Pain     starting @1pm sharp mid-abdominal pain and nausea, denies vomiting/diarrhea, states multiple abdominal surgeries for bowel obstruction and hernia x2, states normal BM x2 today     70-year-old female presents emergency room with chief complaint of a nonradiating sudden onset of upper abdominal pain which began around 130 today  Patient reports that feels sharp and crampy  She reports that has not permit her to get comfortable  It is cause some nausea without vomiting  No diarrhea  History provided by:  Patient  Abdominal Pain  Pain location:  Epigastric  Pain quality: aching, sharp and stabbing    Pain quality: not bloating, not burning and no fullness    Pain radiates to:  Does not radiate  Pain severity:  Moderate  Onset quality:  Sudden  Duration:  5 hours  Timing:  Constant  Progression:  Unchanged  Chronicity:  New  Relieved by:  None tried  Worsened by: Movement  Ineffective treatments:  None tried  Associated symptoms: no chest pain, no chills, no constipation, no cough, no diarrhea, no dysuria, no fatigue, no fever, no nausea, no shortness of breath, no sore throat and no vomiting        Prior to Admission Medications   Prescriptions Last Dose Informant Patient Reported? Taking?    Melatonin 10 MG TABS   Yes Yes   Sig: Take 20 mg by mouth   OZEMPIC, 0 25 OR 0 5 MG/DOSE, 2 MG/1 5ML SOPN   Yes Yes   PARoxetine (PAXIL) 10 mg tablet   Yes Yes   Sig: Take 10 mg by mouth daily   Premarin vaginal cream   Yes No   aspirin (ECOTRIN LOW STRENGTH) 81 mg EC tablet   Yes Yes   Sig: Take 81 mg by mouth daily   cyanocobalamin (VITAMIN B-12) 250 MCG tablet   No Yes   Sig: Take 1 tablet (250 mcg total) by mouth daily   ferrous sulfate 324 (65 Fe) mg   No Yes   Sig: Take 1 tablet (324 mg total) by mouth daily before breakfast   metFORMIN (GLUCOPHAGE-XR) 750 mg 24 hr tablet   Yes Yes   Sig: Take 750 mg by mouth   metoprolol succinate (TOPROL-XL) 100 mg 24 hr tablet   Yes Yes   Sig: Take 100 mg by mouth daily   naproxen (NAPROSYN) 375 mg tablet   No Yes   Sig: Take 1 tablet (375 mg total) by mouth 2 (two) times a day with meals   simvastatin (ZOCOR) 20 mg tablet   Yes Yes   Sig: Take 20 mg by mouth daily at bedtime      Facility-Administered Medications: None       Past Medical History:   Diagnosis Date    Depression     Diabetes mellitus ([address] )     Fistula     High cholesterol     Hypertension        Past Surgical History:   Procedure Laterality Date    BOWEL RESECTION      CHOLECYSTECTOMY LAPAROSCOPIC      HERNIA REPAIR         Family History   Problem Relation Age of Onset    Heart disease Mother      I have reviewed and agree with the history as documented  E-Cigarette/Vaping    E-Cigarette Use Never User      E-Cigarette/Vaping Substances    Nicotine No     THC No     CBD No     Flavoring No     Other No     Unknown No      Social History     Tobacco Use    Smoking status: Never Smoker    Smokeless tobacco: Never Used   Vaping Use    Vaping Use: Never used   Substance Use Topics    Alcohol use: Yes     Comment: occasionally    Drug use: Never       Review of Systems   Constitutional: Negative for activity change, chills, fatigue and fever  HENT: Negative for congestion, ear pain, rhinorrhea, sinus pressure and sore throat  Eyes: Negative  Negative for redness and itching  Respiratory: Negative for cough, chest tightness, shortness of breath and wheezing  Cardiovascular: Negative for chest pain, palpitations and leg swelling  Gastrointestinal: Positive for abdominal pain  Negative for abdominal distention, constipation, diarrhea, nausea and vomiting  Endocrine: Negative  Genitourinary: Negative for dysuria, flank pain, frequency and urgency  Musculoskeletal: Negative for arthralgias, back pain and myalgias  Skin: Negative  Negative for color change, pallor, rash and wound  Allergic/Immunologic: Negative  Neurological: Negative for dizziness, weakness, light-headedness and headaches  Hematological: Negative  Negative for adenopathy  Does not bruise/bleed easily  Psychiatric/Behavioral: The patient is nervous/anxious  All other systems reviewed and are negative  Physical Exam  Physical Exam  Vitals and nursing note reviewed  Constitutional:       General: She is awake  She is in acute distress  Appearance: Normal appearance  She is well-developed  She is obese  She is not ill-appearing or toxic-appearing  HENT:      Head: Normocephalic and atraumatic  Hair is normal       Jaw: No pain on movement  Right Ear: External ear normal       Left Ear: External ear normal       Nose: Nose normal    Eyes:      General: Lids are normal  No scleral icterus  Extraocular Movements: Extraocular movements intact  Pupils: Pupils are equal, round, and reactive to light  Cardiovascular:      Rate and Rhythm: Normal rate and regular rhythm  Heart sounds: Normal heart sounds  No murmur heard  Pulmonary:      Effort: Pulmonary effort is normal  No respiratory distress  Breath sounds: Normal breath sounds  No stridor  No wheezing or rales  Abdominal:      General: Abdomen is flat  There is no distension  Palpations: Abdomen is soft  Tenderness: There is abdominal tenderness in the epigastric area  There is no right CVA tenderness, left CVA tenderness or guarding  Hernia: No hernia is present  Musculoskeletal:         General: No deformity  Normal range of motion  Cervical back: Normal range of motion and neck supple  Skin:     General: Skin is warm and dry  Capillary Refill: Capillary refill takes less than 2 seconds  Coloration: Skin is not jaundiced or pale  Findings: No rash  Neurological:      General: No focal deficit present  Mental Status: She is alert and oriented to person, place, and time  Mental status is at baseline  Cranial Nerves: No cranial nerve deficit     Psychiatric:         Attention and Perception: Attention normal          Mood and Affect: Mood normal          Speech: Speech normal          Behavior: Behavior normal          Vital Signs  ED Triage Vitals [08/20/21 1846]   Temperature Pulse Respirations Blood Pressure SpO2   98 3 °F (36 8 °C) 76 20 (!) 184/84 97 %      Temp Source Heart Rate Source Patient Position - Orthostatic VS BP Location FiO2 (%)   Temporal Monitor Lying Right arm --      Pain Score       --           Vitals:    08/20/21 1846 08/20/21 2057   BP: (!) 184/84 (!) 172/78   Pulse: 76 87   Patient Position - Orthostatic VS: Lying Lying         Visual Acuity      ED Medications  Medications   sodium chloride 0 9 % infusion (0 mL/hr Intravenous Hold 8/20/21 2059)   morphine injection 2 mg (0 mg Intravenous Hold 8/20/21 1906)   lactated ringers bolus 1,000 mL (1,000 mL Intravenous New Bag 8/20/21 2059)   levofloxacin (LEVAQUIN) IVPB (premix in dextrose) 750 mg 150 mL (has no administration in time range)   metroNIDAZOLE (FLAGYL) IVPB (premix) 500 mg 100 mL (500 mg Intravenous New Bag 8/20/21 2108)   lactated ringers bolus 1,000 mL (has no administration in time range)   lactated ringers bolus 1,000 mL (has no administration in time range)   ondansetron (ZOFRAN) injection 4 mg (4 mg Intravenous Given 8/20/21 1906)   iohexol (OMNIPAQUE) 350 MG/ML injection (SINGLE-DOSE) 100 mL (100 mL Intravenous Given 8/20/21 2029)       Diagnostic Studies  Results Reviewed     Procedure Component Value Units Date/Time    UA w Reflex to Microscopic w Reflex to Culture [[id number]]  (Abnormal) Collected: 08/20/21 2057    Lab Status: Final result Specimen: Urine, Clean Catch Updated: 08/20/21 2103     Color, UA Yellow     Clarity, UA Clear     Specific Gravity, UA 1 015     pH, UA 5 5     Leukocytes, UA Negative     Nitrite, UA Negative     Protein, UA Negative mg/dl      Glucose, UA Negative mg/dl      Ketones, UA Negative mg/dl      Urobilinogen, UA 0 2 E U /dl      Bilirubin, UA Small     Blood, UA Negative    Lactic acid [[id number]]  (Abnormal) Collected: 08/20/21 1904    Lab Status: Final result Specimen: Blood from Line, Venous Updated: 08/20/21 1945     LACTIC ACID 2 1 mmol/L     Narrative:      Result may be elevated if tourniquet was used during collection  Lactic acid 2 Hours [[id number]]     Lab Status: No result Specimen: Blood     Lipase [[id number]]  (Abnormal) Collected: 08/20/21 1904    Lab Status: Final result Specimen: Blood from Line, Venous Updated: 08/20/21 1937     Lipase 470 u/L     Comprehensive metabolic panel [[id number]]  (Abnormal) Collected: 08/20/21 1904    Lab Status: Final result Specimen: Blood from Line, Venous Updated: 08/20/21 1937     Sodium 140 mmol/L      Potassium 4 0 mmol/L      Chloride 103 mmol/L      CO2 25 mmol/L      ANION GAP 12 mmol/L      BUN 24 mg/dL      Creatinine 0 88 mg/dL      Glucose 153 mg/dL      Calcium 9 2 mg/dL      AST 16 U/L      ALT 28 U/L      Alkaline Phosphatase 87 U/L      Total Protein 7 2 g/dL      Albumin 3 7 g/dL      Total Bilirubin 0 50 mg/dL      eGFR 71 ml/min/1 73sq m     Narrative:      Main guidelines for Chronic Kidney Disease (CKD):     Stage 1 with normal or high GFR (GFR > 90 mL/min/1 73 square meters)    Stage 2 Mild CKD (GFR = 60-89 mL/min/1 73 square meters)    Stage 3A Moderate CKD (GFR = 45-59 mL/min/1 73 square meters)    Stage 3B Moderate CKD (GFR = 30-44 mL/min/1 73 square meters)    Stage 4 Severe CKD (GFR = 15-29 mL/min/1 73 square meters)    Stage 5 End Stage CKD (GFR <15 mL/min/1 73 square meters)  Note: GFR calculation is accurate only with a steady state creatinine    Blood culture #2 [[id number]] Collected: 08/20/21 1933    Lab Status:  In process Specimen: Blood from Arm, Left Updated: 08/20/21 1935    CBC and differential [[id number]]  (Abnormal) Collected: 08/20/21 1904    Lab Status: Final result Specimen: Blood from Line, Venous Updated: 08/20/21 1920     WBC 13 82 Thousand/uL      RBC 4 41 Million/uL      Hemoglobin 13 2 g/dL      Hematocrit 39 0 %      MCV 88 fL      MCH 29 9 pg      MCHC 33 8 g/dL      RDW 13 1 %      MPV 8 6 fL      Platelets 453 Thousands/uL      nRBC 0 /100 WBCs      Neutrophils Relative 78 %      Immat GRANS % 0 %      Lymphocytes Relative 15 %      Monocytes Relative 7 %      Eosinophils Relative 0 %      Basophils Relative 0 %      Neutrophils Absolute 10 68 Thousands/µL      Immature Grans Absolute 0 05 Thousand/uL      Lymphocytes Absolute 2 06 Thousands/µL      Monocytes Absolute 0 93 Thousand/µL      Eosinophils Absolute 0 06 Thousand/µL      Basophils Absolute 0 04 Thousands/µL     Blood culture #1 [[id number]] Collected: 08/20/21 1904    Lab Status: In process Specimen: Blood from Line, Venous Updated: 08/20/21 1917                 CT abdomen pelvis with contrast   Final Result by Dinorah Addison MD (08/20 2046)      Mid small bowel obstruction possibly secondary to adhesions in the eventrated lower abdominal wall similar to prior study with herniation of small bowel loops, cecum  The lower anterior abdominal collection extending to the skin is again seen with thick scar tissue and minimal fluid and fistula to the small bowel cannot be excluded         The study was marked in EPIC for immediate notification  Workstation performed: [id number]                    Procedures  Procedures         ED Course  ED Course as of Aug 20 2115   Pablo Grimes Aug 20, 2021   0516 Patient vomited over 700 cc of yellowish fluid  Reports that she feels somewhat better at this point  2009 WBC(!): 13 82   2009 LACTIC ACID(!!): 2 1   2009 Lipase(!): 470   2011 Lactate 2 1  Will provide fluid bolus      2011 Patient's BMI > 30  For purposes of fluid resuscitation, IBW was utilized to calculate target volume to be administered  2100 Patient with evidence of small-bowel obstruction  Will discuss with surgery      2101 Discussed with Dr John Márquez  Patient may be admitted to our facility  2104 Patient was discussed with Medicine and admitted                                SBIRT 22yo+      Most Recent Value   SBIRT (24 yo +)   In order to provide better care to our patients, we are screening all of our patients for alcohol and drug use  Would it be okay to ask you these screening questions? Yes Filed at: 08/20/2021 1826   Initial Alcohol Screen: US AUDIT-C    1  How often do you have a drink containing alcohol?  0 Filed at: 08/20/2021 1826   2  How many drinks containing alcohol do you have on a typical day you are drinking? 0 Filed at: 08/20/2021 1826   3a  Male UNDER 65: How often do you have five or more drinks on one occasion? 0 Filed at: 08/20/2021 1826   3b  FEMALE Any Age, or MALE 65+: How often do you have 4 or more drinks on one occassion? 0 Filed at: 08/20/2021 1826   Audit-C Score  0 Filed at: 08/20/2021 1826   TANIA: How many times in the past year have you    Used an illegal drug or used a prescription medication for non-medical reasons? Never Filed at: 08/20/2021 1826                    MDM  Number of Diagnoses or Management Options  Abdominal pain: new and requires workup  Small bowel obstruction [facility]): new and requires workup  Diagnosis management comments: Patient presented to the emergency department and a MSE was performed  The patient was evaluated and diagnosed with acute small bowel obstruction  This is a new issue that will require additional planned work-up and treatment in a hospitalized setting  As may have been required as part of this evaluation, clinical laboratory test, radiology imaging and medical testing (I e  EKG) were ordered as necessitated by the patient's presentation  I independently reviewed these studies, imaging and testing   This patient's case is considered to be a considerable risk secondary to the above listed disease process and poses a threat to the patient's well-being and baseline function  Further in-patient diagnostic testing and management, which may include the administration of parenteral medications, is required  Amount and/or Complexity of Data Reviewed  Clinical lab tests: ordered  Tests in the radiology section of CPT®: ordered and reviewed  Discussion of test results with the performing providers: yes  Decide to obtain previous medical records or to obtain history from someone other than the patient: yes  Discuss the patient with other providers: yes    Risk of Complications, Morbidity, and/or Mortality  Presenting problems: high  Diagnostic procedures: moderate  Management options: moderate    Patient Progress  Patient progress: improved      Disposition  Final diagnoses:   Abdominal pain   Small bowel obstruction ([address] )     Time reflects when diagnosis was documented in both MDM as applicable and the Disposition within this note     Time User Action Codes Description Comment    8/20/2021  9:05 PM Lizbeth Rice Add [R10 9] Abdominal pain     8/20/2021  9:05 PM Lizbeth Rice Add [K56 609] Small bowel obstruction [facility])       ED Disposition     ED Disposition Condition Date/Time Comment    Admit Stable Fri Aug 20, 2021  9:05 PM         Follow-up Information    None         Patient's Medications   Discharge Prescriptions    No medications on file     No discharge procedures on file      PDMP Review     None          ED Provider  Electronically Signed by           Coleen Florian DO  08/20/21 6313

## 2021-08-20 NOTE — ED NOTES
Just prior to IV start, pt began vomiting, 700ml of emesis noted, after vomiting pt states her pain is gone, wishing to hold Morphine at this time       Flavio Lambert RN  08/20/21 9799

## 2021-08-21 PROBLEM — D62 ACUTE BLOOD LOSS ANEMIA (ABLA): Status: ACTIVE | Noted: 2021-08-21

## 2021-08-21 PROBLEM — A41.9 SEPSIS WITH ACUTE ORGAN DYSFUNCTION (HCC): Status: ACTIVE | Noted: 2021-08-21

## 2021-08-21 PROBLEM — R65.20 SEPSIS WITH ACUTE ORGAN DYSFUNCTION (HCC): Status: ACTIVE | Noted: 2021-08-21

## 2021-08-21 PROBLEM — R74.8 ELEVATED LIPASE: Status: ACTIVE | Noted: 2021-08-21

## 2021-08-21 LAB
ANION GAP SERPL CALCULATED.3IONS-SCNC: 6 MMOL/L (ref 4–13)
BASOPHILS # BLD AUTO: 0.01 THOUSANDS/ΜL (ref 0–0.1)
BASOPHILS NFR BLD AUTO: 0 % (ref 0–1)
BUN SERPL-MCNC: 17 MG/DL (ref 5–25)
CALCIUM SERPL-MCNC: 7.6 MG/DL (ref 8.3–10.1)
CHLORIDE SERPL-SCNC: 104 MMOL/L (ref 100–108)
CO2 SERPL-SCNC: 26 MMOL/L (ref 21–32)
CREAT SERPL-MCNC: 0.84 MG/DL (ref 0.6–1.3)
EOSINOPHIL # BLD AUTO: 0.11 THOUSAND/ΜL (ref 0–0.61)
EOSINOPHIL NFR BLD AUTO: 1 % (ref 0–6)
ERYTHROCYTE [DISTWIDTH] IN BLOOD BY AUTOMATED COUNT: 13.1 % (ref 11.6–15.1)
EST. AVERAGE GLUCOSE BLD GHB EST-MCNC: 154 MG/DL
FERRITIN SERPL-MCNC: 31 NG/ML (ref 8–388)
FOLATE SERPL-MCNC: >20 NG/ML (ref 3.1–17.5)
GFR SERPL CREATININE-BSD FRML MDRD: 75 ML/MIN/1.73SQ M
GLUCOSE SERPL-MCNC: 102 MG/DL (ref 65–140)
GLUCOSE SERPL-MCNC: 111 MG/DL (ref 65–140)
GLUCOSE SERPL-MCNC: 111 MG/DL (ref 65–140)
GLUCOSE SERPL-MCNC: 117 MG/DL (ref 65–140)
GLUCOSE SERPL-MCNC: 125 MG/DL (ref 65–140)
HBA1C MFR BLD: 7 %
HCT VFR BLD AUTO: 32.2 % (ref 34.8–46.1)
HCT VFR BLD AUTO: 34.6 % (ref 34.8–46.1)
HGB BLD-MCNC: 10.9 G/DL (ref 11.5–15.4)
HGB BLD-MCNC: 11.5 G/DL (ref 11.5–15.4)
IMM GRANULOCYTES # BLD AUTO: 0.02 THOUSAND/UL (ref 0–0.2)
IMM GRANULOCYTES NFR BLD AUTO: 0 % (ref 0–2)
IRON SATN MFR SERPL: 20 %
IRON SERPL-MCNC: 64 UG/DL (ref 50–170)
LACTATE SERPL-SCNC: 1.8 MMOL/L (ref 0.5–2)
LACTATE SERPL-SCNC: 2.2 MMOL/L (ref 0.5–2)
LIPASE SERPL-CCNC: 97 U/L (ref 73–393)
LYMPHOCYTES # BLD AUTO: 1.49 THOUSANDS/ΜL (ref 0.6–4.47)
LYMPHOCYTES NFR BLD AUTO: 16 % (ref 14–44)
MAGNESIUM SERPL-MCNC: 1.1 MG/DL (ref 1.6–2.6)
MAGNESIUM SERPL-MCNC: 1.8 MG/DL (ref 1.6–2.6)
MCH RBC QN AUTO: 30.1 PG (ref 26.8–34.3)
MCHC RBC AUTO-ENTMCNC: 33.9 G/DL (ref 31.4–37.4)
MCV RBC AUTO: 89 FL (ref 82–98)
MONOCYTES # BLD AUTO: 0.85 THOUSAND/ΜL (ref 0.17–1.22)
MONOCYTES NFR BLD AUTO: 9 % (ref 4–12)
NEUTROPHILS # BLD AUTO: 6.66 THOUSANDS/ΜL (ref 1.85–7.62)
NEUTS SEG NFR BLD AUTO: 74 % (ref 43–75)
NRBC BLD AUTO-RTO: 0 /100 WBCS
PHOSPHATE SERPL-MCNC: 3.5 MG/DL (ref 2.3–4.1)
PLATELET # BLD AUTO: 263 THOUSANDS/UL (ref 149–390)
PMV BLD AUTO: 8.6 FL (ref 8.9–12.7)
POTASSIUM SERPL-SCNC: 4.1 MMOL/L (ref 3.5–5.3)
RBC # BLD AUTO: 3.62 MILLION/UL (ref 3.81–5.12)
SODIUM SERPL-SCNC: 136 MMOL/L (ref 136–145)
TIBC SERPL-MCNC: 315 UG/DL (ref 250–450)
VIT B12 SERPL-MCNC: 599 PG/ML (ref 100–900)
WBC # BLD AUTO: 9.14 THOUSAND/UL (ref 4.31–10.16)

## 2021-08-21 PROCEDURE — 82746 ASSAY OF FOLIC ACID SERUM: CPT | Performed by: FAMILY MEDICINE

## 2021-08-21 PROCEDURE — 36415 COLL VENOUS BLD VENIPUNCTURE: CPT | Performed by: NURSE PRACTITIONER

## 2021-08-21 PROCEDURE — 83605 ASSAY OF LACTIC ACID: CPT | Performed by: NURSE PRACTITIONER

## 2021-08-21 PROCEDURE — 83540 ASSAY OF IRON: CPT | Performed by: FAMILY MEDICINE

## 2021-08-21 PROCEDURE — NC001 PR NO CHARGE

## 2021-08-21 PROCEDURE — 85025 COMPLETE CBC W/AUTO DIFF WBC: CPT | Performed by: NURSE PRACTITIONER

## 2021-08-21 PROCEDURE — 85018 HEMOGLOBIN: CPT | Performed by: FAMILY MEDICINE

## 2021-08-21 PROCEDURE — 82948 REAGENT STRIP/BLOOD GLUCOSE: CPT

## 2021-08-21 PROCEDURE — 85014 HEMATOCRIT: CPT | Performed by: FAMILY MEDICINE

## 2021-08-21 PROCEDURE — 99233 SBSQ HOSP IP/OBS HIGH 50: CPT | Performed by: FAMILY MEDICINE

## 2021-08-21 PROCEDURE — 82728 ASSAY OF FERRITIN: CPT | Performed by: FAMILY MEDICINE

## 2021-08-21 PROCEDURE — 83690 ASSAY OF LIPASE: CPT | Performed by: NURSE PRACTITIONER

## 2021-08-21 PROCEDURE — 83550 IRON BINDING TEST: CPT | Performed by: FAMILY MEDICINE

## 2021-08-21 PROCEDURE — 83036 HEMOGLOBIN GLYCOSYLATED A1C: CPT | Performed by: NURSE PRACTITIONER

## 2021-08-21 PROCEDURE — 84100 ASSAY OF PHOSPHORUS: CPT | Performed by: NURSE PRACTITIONER

## 2021-08-21 PROCEDURE — 83735 ASSAY OF MAGNESIUM: CPT | Performed by: NURSE PRACTITIONER

## 2021-08-21 PROCEDURE — 80048 BASIC METABOLIC PNL TOTAL CA: CPT | Performed by: NURSE PRACTITIONER

## 2021-08-21 PROCEDURE — 83735 ASSAY OF MAGNESIUM: CPT | Performed by: FAMILY MEDICINE

## 2021-08-21 PROCEDURE — 82607 VITAMIN B-12: CPT | Performed by: FAMILY MEDICINE

## 2021-08-21 RX ORDER — MAGNESIUM SULFATE HEPTAHYDRATE 40 MG/ML
2 INJECTION, SOLUTION INTRAVENOUS ONCE
Status: COMPLETED | OUTPATIENT
Start: 2021-08-21 | End: 2021-08-21

## 2021-08-21 RX ORDER — LANOLIN ALCOHOL/MO/W.PET/CERES
3 CREAM (GRAM) TOPICAL
Status: DISCONTINUED | OUTPATIENT
Start: 2021-08-21 | End: 2021-08-21

## 2021-08-21 RX ORDER — ACETAMINOPHEN 325 MG/1
650 TABLET ORAL EVERY 6 HOURS PRN
Status: DISCONTINUED | OUTPATIENT
Start: 2021-08-21 | End: 2021-08-22 | Stop reason: HOSPADM

## 2021-08-21 RX ORDER — PAROXETINE HYDROCHLORIDE 20 MG/1
10 TABLET, FILM COATED ORAL DAILY
Status: DISCONTINUED | OUTPATIENT
Start: 2021-08-21 | End: 2021-08-22 | Stop reason: HOSPADM

## 2021-08-21 RX ORDER — METOPROLOL SUCCINATE 100 MG/1
100 TABLET, EXTENDED RELEASE ORAL DAILY
Status: DISCONTINUED | OUTPATIENT
Start: 2021-08-21 | End: 2021-08-22 | Stop reason: HOSPADM

## 2021-08-21 RX ADMIN — SODIUM CHLORIDE 100 ML/HR: 0.9 INJECTION, SOLUTION INTRAVENOUS at 13:19

## 2021-08-21 RX ADMIN — SODIUM CHLORIDE 100 ML/HR: 0.9 INJECTION, SOLUTION INTRAVENOUS at 00:02

## 2021-08-21 RX ADMIN — ACETAMINOPHEN 650 MG: 325 TABLET ORAL at 01:58

## 2021-08-21 RX ADMIN — MELATONIN 3 MG: 3 TAB ORAL at 01:58

## 2021-08-21 RX ADMIN — HEPARIN SODIUM 5000 UNITS: 5000 INJECTION INTRAVENOUS; SUBCUTANEOUS at 21:12

## 2021-08-21 RX ADMIN — HEPARIN SODIUM 5000 UNITS: 5000 INJECTION INTRAVENOUS; SUBCUTANEOUS at 13:19

## 2021-08-21 RX ADMIN — METOPROLOL SUCCINATE 100 MG: 100 TABLET, EXTENDED RELEASE ORAL at 08:59

## 2021-08-21 RX ADMIN — PAROXETINE HYDROCHLORIDE HEMIHYDRATE 10 MG: 20 TABLET, FILM COATED ORAL at 08:59

## 2021-08-21 RX ADMIN — MAGNESIUM SULFATE HEPTAHYDRATE 2 G: 40 INJECTION, SOLUTION INTRAVENOUS at 09:00

## 2021-08-21 NOTE — ASSESSMENT & PLAN NOTE
· Mildly elevated lipase probably secondary to SBO  · Trend lipase  · No evidence of pancreatitis on CT

## 2021-08-21 NOTE — H&P
[address]  H&P- Vernia Poles 1960, 64 y o  female MRN: [id number]  Unit/Bed#: ED 12 Encounter: [id number]  Primary Care Provider: Yonathan Pearl MD   Date and time admitted to hospital: 8/20/2021  6:18 PM    * SBO (small bowel obstruction) ([facility] )  Assessment & Plan  · POA with severe abdominal pain developing earlier in the day associated with vomiting, similar to previous SBO that required surgical intervention  · CT abdomen pelvis-Mid small bowel obstruction possibly secondary to adhesions in the eventrated lower abdominal wall similar to prior study with herniation of small bowel loops, cecum    · Patient denies abdominal pain or nausea at time of admission, she reports liquid stools  · Continue NPO, IV hydration  · Will place NG tube if vomiting resumes  · Appreciate general surgical consultation    Sepsis with acute organ dysfunction ([facility] )  Assessment & Plan  · POA with leukocytosis, lactic acidosis, afebrile  · CT chest revealed SBO- suspect source of leukocytosis/lactic acidosis  · Received fluid resuscitation 30 mL/kg for ideal body weight  · Lactic acidosis resolved  · Hold antibiotic pending culture results  · Trend fever curve, leukocytosis, procalcitonin    Elevated lipase  Assessment & Plan  · Mildly elevated lipase probably secondary to SBO  · Trend lipase  · No evidence of pancreatitis on CT    Hernia of abdominal wall  Assessment & Plan  · Multiple hernias with patient 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 that is followed by outside surgical team at [facility]  · Continue outpatient follow-up    Type 2 diabetes mellitus with hyperglycemia, without long-term current use of insulin [facility])  Assessment & Plan  Lab Results   Component Value Date    HGBA1C 6 7 (H) 05/01/2021       Recent Labs     08/21/21  0003   POCGLU 125       Blood Sugar Average: Last 72 hrs:  (P) 125     Check hemoglobin A1c  Metformin on hold for 48 hours after CT dye  Sliding scale insulin with Accu-Cheks    Essential hypertension  Assessment & Plan  Hypertensive crisis on arrival resolved with pain control  Continue metoprolol succinate 100 mg daily  Trend blood pressures    Morbid obesity with BMI of 45 0-49 9, adult (HCC)  Assessment & Plan  · Requires intensive lifestyle modification    Hypercholesterolemia  Assessment & Plan  · Resume simvastatin when tolerating oral intake    VTE Pharmacologic Prophylaxis:   Moderate Risk (Score 3-4) - Pharmacological DVT Prophylaxis Ordered: heparin  Code Status: Level 1 - Full Code   Discussion with family: Patient declined call to   Anticipated Length of Stay: Patient will be admitted on an inpatient basis with an anticipated length of stay of greater than 2 midnights secondary to SBO  Total Time for Visit, including Counseling / Coordination of Care: 30 minutes Greater than 50% of this total time spent on direct patient counseling and coordination of care  Chief Complaint:  Abdominal pain and vomiting    History of Present Illness:  Mary Luna is a 64 y o  female with a PMH of small-bowel obstruction requiring lysis of adhesions, diabetes mellitus, hypertension, morbid obesity, current hernias with reported fistula that is being followed by surgical team at outside hospital   She reports developing severe mid abdominal pain around 1:00 p m  With vomiting  In the emergency department she underwent CT abdomen pelvis revealing small bowel obstruction  Abdominal pain and vomiting ceased at time of admission therefore NG tube was not placed  Review of Systems:  Review of Systems   Constitutional: Negative for chills and fever  HENT: Negative for ear pain and sore throat  Eyes: Negative for pain and visual disturbance  Respiratory: Negative for cough and shortness of breath  Cardiovascular: Negative for chest pain and palpitations  Gastrointestinal: Positive for abdominal pain, nausea and vomiting  Genitourinary: Negative for dysuria and hematuria  Musculoskeletal: Negative for arthralgias and back pain  Skin: Positive for wound  Negative for color change and rash  Neurological: Negative for seizures and syncope  All other systems reviewed and are negative  Past Medical and Surgical History:   Past Medical History:   Diagnosis Date    Depression     Diabetes mellitus ([address] )     Fistula     High cholesterol     Hypertension        Past Surgical History:   Procedure Laterality Date    BOWEL RESECTION      CHOLECYSTECTOMY LAPAROSCOPIC      HERNIA REPAIR         Meds/Allergies:  Prior to Admission medications    Medication Sig Start Date End Date Taking?  Authorizing Provider   aspirin (ECOTRIN LOW STRENGTH) 81 mg EC tablet Take 81 mg by mouth daily   Yes Historical Provider, MD   cyanocobalamin (VITAMIN B-12) 250 MCG tablet Take 1 tablet (250 mcg total) by mouth daily 12/5/19  Yes Kristine Phan DO   ferrous sulfate 324 (65 Fe) mg Take 1 tablet (324 mg total) by mouth daily before breakfast 12/5/19  Yes Kristine Phan DO   Melatonin 10 MG TABS Take 20 mg by mouth   Yes Historical Provider, MD   metFORMIN (GLUCOPHAGE-XR) 750 mg 24 hr tablet Take 750 mg by mouth 4/12/21 4/12/22 Yes Historical Provider, MD   metoprolol succinate (TOPROL-XL) 100 mg 24 hr tablet Take 100 mg by mouth daily 10/1/19  Yes Historical Provider, MD   OZEMPIC, 0 25 OR 0 5 MG/DOSE, 2 MG/1 5ML SOPN once a week Patient takes every Saturday 1/29/20  Yes Historical Provider, MD   PARoxetine (PAXIL) 10 mg tablet Take 10 mg by mouth daily   Yes Historical Provider, MD   simvastatin (ZOCOR) 20 mg tablet Take 20 mg by mouth daily at bedtime   Yes Historical Provider, MD   naproxen (NAPROSYN) 375 mg tablet Take 1 tablet (375 mg total) by mouth 2 (two) times a day with meals  Patient not taking: Reported on 8/20/2021 3/23/20   Kirti Smith PA-C   Premarin vaginal cream  4/5/21   Historical Provider, MD I have reviewed home medications with patient personally  Allergies: Allergies   Allergen Reactions    Zithromax [Azithromycin] Abdominal Pain    Penicillins Rash       Social History:  Marital Status: /Civil Union   Occupation:  Dietary at [employer]  Patient Pre-hospital Living Situation: Home  Patient Pre-hospital Level of Mobility: walks  Patient Pre-hospital Diet Restrictions:  None  Substance Use History:   Social History     Substance and Sexual Activity   Alcohol Use Yes    Comment: occasionally     Social History     Tobacco Use   Smoking Status Never Smoker   Smokeless Tobacco Never Used     Social History     Substance and Sexual Activity   Drug Use Never       Family History:  Family History   Problem Relation Age of Onset    Heart disease Mother        Physical Exam:     Vitals:   Blood Pressure: 148/73 (08/21/21 0100)  Pulse: 86 (08/21/21 0245)  Temperature: (!) 97 2 °F (36 2 °C) (08/21/21 0245)  Temp Source: Temporal (08/21/21 0245)  Respirations: 20 (08/21/21 0100)  Height: 5' 5" (165 1 cm) (08/21/21 0100)  Weight - Scale: 127 kg (279 lb 15 8 oz) (08/21/21 0100)  SpO2: 92 % (08/21/21 0245)    Physical Exam  Vitals and nursing note reviewed  Constitutional:       General: She is not in acute distress  Appearance: She is well-developed  She is obese  HENT:      Head: Normocephalic and atraumatic  Mouth/Throat:      Mouth: Mucous membranes are dry  Eyes:      Conjunctiva/sclera: Conjunctivae normal    Cardiovascular:      Rate and Rhythm: Normal rate and regular rhythm  Heart sounds: No murmur heard  Pulmonary:      Effort: Pulmonary effort is normal  No respiratory distress  Breath sounds: Normal breath sounds  Abdominal:      General: There is distension  Palpations: Abdomen is soft  Tenderness: There is abdominal tenderness  There is no guarding  Musculoskeletal:      Cervical back: Neck supple  Right lower leg: Edema present  Left lower leg: Edema present  Skin:     General: Skin is warm and dry  Capillary Refill: Capillary refill takes less than 2 seconds  Comments: Abdominal wound   Neurological:      General: No focal deficit present  Mental Status: She is alert  Cranial Nerves: No cranial nerve deficit  Psychiatric:         Mood and Affect: Mood normal          Behavior: Behavior normal          Additional Data:     Lab Results:  Results from last 7 days   Lab Units 08/21/21  0519   WBC Thousand/uL 9 14   HEMOGLOBIN g/dL 10 9*   HEMATOCRIT % 32 2*   PLATELETS Thousands/uL 263   NEUTROS PCT % 74   LYMPHS PCT % 16   MONOS PCT % 9   EOS PCT % 1     Results from last 7 days   Lab Units 08/21/21  0519 08/20/21  1904   SODIUM mmol/L 136 140   POTASSIUM mmol/L 4 1 4 0   CHLORIDE mmol/L 104 103   CO2 mmol/L 26 25   BUN mg/dL 17 24   CREATININE mg/dL 0 84 0 88   ANION GAP mmol/L 6 12   CALCIUM mg/dL 7 6* 9 2   ALBUMIN g/dL  --  3 7   TOTAL BILIRUBIN mg/dL  --  0 50   ALK PHOS U/L  --  87   ALT U/L  --  28   AST U/L  --  16   GLUCOSE RANDOM mg/dL 111 153*         Results from last 7 days   Lab Units 08/21/21  0003   POC GLUCOSE mg/dl 125         Results from last 7 days   Lab Units 08/21/21  0305 08/21/21  0110 08/20/21  2239 08/20/21  1904   LACTIC ACID mmol/L 1 8 2 2* 3 0* 2 1*       Imaging: Reviewed radiology reports from this admission including: abdominal/pelvic CT  CT abdomen pelvis with contrast   Final Result by Chidi Alonzo MD (08/20 2046)      Mid small bowel obstruction possibly secondary to adhesions in the eventrated lower abdominal wall similar to prior study with herniation of small bowel loops, cecum  The lower anterior abdominal collection extending to the skin is again seen with thick scar tissue and minimal fluid and fistula to the small bowel cannot be excluded         The study was marked in EPIC for immediate notification        Workstation performed: [id number]             EKG and Other Studies Reviewed on Admission:   · All    ** Please Note: This note has been constructed using a voice recognition system   **

## 2021-08-21 NOTE — ASSESSMENT & PLAN NOTE
· POA with severe abdominal pain developing earlier in the day associated with vomiting, similar to previous SBO that required surgical intervention  · CT abdomen pelvis-Mid small bowel obstruction possibly secondary to adhesions in the eventrated lower abdominal wall similar to prior study with herniation of small bowel loops, cecum    · Patient denies abdominal pain or nausea at time of admission, she reports liquid stools  · Continue NPO, IV hydration  · Will place NG tube if vomiting resumes  · General surgery consult appreciated

## 2021-08-21 NOTE — ASSESSMENT & PLAN NOTE
Lab Results   Component Value Date    HGBA1C 6 7 (H) 05/01/2021       Recent Labs     08/21/21  0003 08/21/21  0601 08/21/21  1104   POCGLU 125 111 117       Blood Sugar Average: Last 72 hrs:  (P) 520 [id number]     Check hemoglobin A1c  Metformin on hold for 48 hours after CT dye  Sliding scale insulin with Accu-Cheks

## 2021-08-21 NOTE — ASSESSMENT & PLAN NOTE
Could be secondary to small-bowel obstruction  Hemoglobin dropped almost 2 units from 13 2-10 9  Hemoglobin remained stable  Lab Results   Component Value Date    IRON 64 08/21/2021    TIBC 315 08/21/2021    FERRITIN 31 08/21/2021     Lab Results   Component Value Date    CUAXCNTA91 222 08/21/2021     Lab Results   Component Value Date    FOLATE >20 0 (H) 08/21/2021     Patient does not require any blood transfusion

## 2021-08-21 NOTE — ASSESSMENT & PLAN NOTE
Hypertensive crisis on arrival resolved with pain control  Continue metoprolol succinate 100 mg daily  Trend blood pressures

## 2021-08-21 NOTE — ASSESSMENT & PLAN NOTE
· Multiple hernias with patient 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 that is followed by outside surgical team at [facility]  · Continue outpatient follow-up

## 2021-08-21 NOTE — PLAN OF CARE
Problem: PAIN - ADULT  Goal: Verbalizes/displays adequate comfort level or baseline comfort level  Description: Interventions:  - Encourage patient to monitor pain and request assistance  - Assess pain using appropriate pain scale0-10  - Administer analgesics based on type and severity of pain and evaluate response  - Implement non-pharmacological measures as appropriate and evaluate response  - Consider cultural and social influences on pain and pain management  - Notify physician/advanced practitioner if interventions unsuccessful or patient reports new pain  Outcome: Progressing     Problem: INFECTION - ADULT  Goal: Absence or prevention of progression during hospitalization  Description: INTERVENTIONS:  - Assess and monitor for signs and symptoms of infection  - Monitor lab/diagnostic results  - Monitor all insertion sites, mid abdomen  - Monitor endotracheal if appropriate and nasal secretions for changes in amount and color  - [place] appropriate cooling/warming therapies per order  - Administer medications as ordered  - Instruct and encourage patient and family to use good hand hygiene technique  - Identify and instruct in appropriate isolation precautions for identified infection/condition  Outcome: Not Progressing  Goal: Absence of fever/infection during neutropenic period  Description: INTERVENTIONS:  - Monitor WBC    Outcome: Not Progressing

## 2021-08-21 NOTE — PROGRESS NOTES
[address]  Progress Note - Kamala Hua 1960, 64 y o  female MRN: [id number]  Unit/Bed#: ED 12 Encounter: [id number]  Primary Care Provider: Brenda Mohr MD   Date and time admitted to hospital: 8/20/2021  6:18 PM      Acute but loss anemia  Not requiring blood transfusion  Could be secondary to small-bowel obstruction  Hemoglobin dropped almost 2 units from 13 2-10 9  Recheck H&H, iron panel, folic acid, vitamin R62, occult blood test  Transfuse if hemoglobin drops less than 7 or patient becomes symptomatic      SBO (small bowel obstruction) (HCC)  Assessment & Plan  · POA with severe abdominal pain developing earlier in the day associated with vomiting, similar to previous SBO that required surgical intervention  · CT abdomen pelvis-Mid small bowel obstruction possibly secondary to adhesions in the eventrated lower abdominal wall similar to prior study with herniation of small bowel loops, cecum    · Patient denies abdominal pain or nausea at time of admission, she reports liquid stools  · Continue NPO, IV hydration  · Will place NG tube if vomiting resumes  · General surgery consult appreciated    * Sepsis with acute organ dysfunction ([facility] )  Assessment & Plan  · POA with leukocytosis, lactic acidosis, afebrile  · CT chest revealed SBO- suspect source of leukocytosis/lactic acidosis  · Received fluid resuscitation 30 mL/kg for ideal body weight  · Lactic acidosis resolved  · Hold antibiotic pending culture results  · Trend fever curve, leukocytosis, procalcitonin    Elevated lipase  Assessment & Plan  · Mildly elevated lipase probably secondary to SBO  · Trend lipase  · No evidence of pancreatitis on CT    Hernia of abdominal wall  Assessment & Plan  · Multiple hernias with patient 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 that is followed by outside surgical team at [facility]  · Continue outpatient follow-up    Type 2 diabetes mellitus with hyperglycemia, without long-term current use of insulin [facility])  Assessment & Plan  Lab Results   Component Value Date    HGBA1C 6 7 (H) 2021       Recent Labs     21  0003 21  0601 21  1104   POCGLU 125 111 117       Blood Sugar Average: Last 72 hrs:  (P) 896 [id number]     Check hemoglobin A1c  Metformin on hold for 48 hours after CT dye  Sliding scale insulin with Accu-Cheks    Essential hypertension  Assessment & Plan  Hypertensive crisis on arrival resolved with pain control  Continue metoprolol succinate 100 mg daily  Trend blood pressures    Morbid obesity with BMI of 45 0-49 9, adult (HCC)  Assessment & Plan  · Requires intensive lifestyle modification    Hypercholesterolemia  Assessment & Plan  · Resume simvastatin when tolerating oral intake          VTE Pharmacologic Prophylaxis: VTE Score: 5 High Risk (Score >/= 5) - Pharmacological DVT Prophylaxis Ordered: heparin  Sequential Compression Devices Ordered  Patient Centered Rounds: I performed bedside rounds with nursing staff today  Discussions with Specialists or Other Care Team Provider:  General surgery    Education and Discussions with Family / Patient: With patient  Time Spent for Care: 20 minutes  More than 50% of total time spent on counseling and coordination of care as described above  Current Length of Stay: 1 day(s)  Current Patient Status: Inpatient   Certification Statement: The patient will continue to require additional inpatient hospital stay due to Sepsis, small bowel obstruction  Discharge Plan: Anticipate discharge in 48-72 hrs to home  Code Status: Level 1 - Full Code    Subjective:   Seen and evaluated during the round  Patient reports she is feeling much better  Denies any nausea    Mild abdominal pain    Objective:     Vitals:   Temp (24hrs), Av 9 °F (36 6 °C), Min:97 2 °F (36 2 °C), Max:99 5 °F (37 5 °C)    Temp:  [97 2 °F (36 2 °C)-99 5 °F (37 5 °C)] 97 4 °F (36 3 °C)  HR:  [72-87] 72  Resp:  [18-20] 18  BP: (127-184)/(58-84) 129/58  SpO2:  [92 %-97 %] 94 %  Body mass index is 46 59 kg/m²  Input and Output Summary (last 24 hours): Intake/Output Summary (Last 24 hours) at 8/21/2021 1157  Last data filed at 8/21/2021 1105  Gross per 24 hour   Intake 2698 33 ml   Output --   Net 2698 33 ml       Physical Exam:   Physical Exam  Vitals and nursing note reviewed  Constitutional:       Appearance: She is not diaphoretic  HENT:      Nose: Nose normal       Mouth/Throat:      Pharynx: No oropharyngeal exudate  Eyes:      General: No scleral icterus  Conjunctiva/sclera: Conjunctivae normal       Pupils: Pupils are equal, round, and reactive to light  Cardiovascular:      Rate and Rhythm: Normal rate  Heart sounds: No murmur heard  No friction rub  No gallop  Pulmonary:      Effort: Pulmonary effort is normal  No respiratory distress  Breath sounds: No stridor  No wheezing or rhonchi  Abdominal:      General: Abdomen is flat  Bowel sounds are normal       Tenderness: There is abdominal tenderness  There is no rebound  Hernia: No hernia is present  Musculoskeletal:         General: Normal range of motion  Cervical back: Normal range of motion  Lymphadenopathy:      Cervical: No cervical adenopathy  Skin:     General: Skin is warm  Neurological:      General: No focal deficit present  Mental Status: She is alert and oriented to person, place, and time  Cranial Nerves: No cranial nerve deficit  Motor: No weakness        Coordination: Coordination normal    Psychiatric:         Mood and Affect: Mood normal          Additional Data:     Labs:  Results from last 7 days   Lab Units 08/21/21  0519   WBC Thousand/uL 9 14   HEMOGLOBIN g/dL 10 9*   HEMATOCRIT % 32 2*   PLATELETS Thousands/uL 263   NEUTROS PCT % 74   LYMPHS PCT % 16   MONOS PCT % 9   EOS PCT % 1     Results from last 7 days   Lab Units 08/21/21  0519 08/20/21  1904   SODIUM mmol/L 136 140   POTASSIUM mmol/L 4 1 4 0   CHLORIDE mmol/L 104 103   CO2 mmol/L 26 25   BUN mg/dL 17 24   CREATININE mg/dL 0 84 0 88   ANION GAP mmol/L 6 12   CALCIUM mg/dL 7 6* 9 2   ALBUMIN g/dL  --  3 7   TOTAL BILIRUBIN mg/dL  --  0 50   ALK PHOS U/L  --  87   ALT U/L  --  28   AST U/L  --  16   GLUCOSE RANDOM mg/dL 111 153*         Results from last 7 days   Lab Units 08/21/21  1104 08/21/21  0601 08/21/21  0003   POC GLUCOSE mg/dl 117 111 125         Results from last 7 days   Lab Units 08/21/21  0305 08/21/21  0110 08/20/21  2239 08/20/21  1904   LACTIC ACID mmol/L 1 8 2 2* 3 0* 2 1*       Lines/Drains:  Invasive Devices     Peripheral Intravenous Line            Peripheral IV 08/20/21 Right Antecubital <1 day                      Imaging: Reviewed radiology reports from this admission including: abdominal/pelvic CT    Recent Cultures (last 7 days):   Results from last 7 days   Lab Units 08/20/21  1933 08/20/21  1904   BLOOD CULTURE  Received in Microbiology Lab  Culture in Progress  Received in Microbiology Lab  Culture in Progress  Last 24 Hours Medication List:   Current Facility-Administered Medications   Medication Dose Route Frequency Provider Last Rate    acetaminophen  650 mg Oral Q6H PRN Yumiko S Hugh, CRNP      heparin (porcine)  5,000 Units Subcutaneous Q8H [address] Yumiko S Hugh, CRNP      insulin lispro  2-12 Units Subcutaneous Q6H [address] Yumiko S Hugh, CRNP      metoprolol succinate  100 mg Oral Daily Yumiko S Hugh, CRNP      morphine injection  2 mg Intravenous Once Dashawn TAMY Harshil, DO      morphine injection  2 mg Intravenous Q4H PRN Yumiko S Hugh, CRNP      ondansetron  4 mg Intravenous Q6H PRN Yumiko S Hugh, CRNP      PARoxetine  10 mg Oral Daily Yumiko S Hugh, CRNP      sodium chloride  100 mL/hr Intravenous Continuous Yumiko S Hugh, CRNP 100 mL/hr (08/21/21 0002)        Today, Patient Was Seen By: Miguel Angel Kumar MD    **Please Note: This note may have been constructed using a voice recognition system  **

## 2021-08-21 NOTE — CONSULTS
Consultation - General Surgery   Og Hernandez 64 y o  female MRN: [id number]  Unit/Bed#: ED 12 Encounter: [id number]    Assessment/Plan     Assessment:  Small-bowel obstruction, recurrent  Sepsis present on admission based on leukocytosis and lactic acidosis  WBC on admission 13 82  Large ventral hernia with herniation of small bowel loops in cecum  Likely extensive intra-abdominal adhesions with multiple abdominal surgeries and abdominal wall hernias with chronic enterocutaneous fistula formation draining yellow clear fluid for the past urine half  Morbid obesity, BMI 46 59  Lactic acidosis present admission 3 0, now resolved with IV fluid hydration  Type 2 diabetes mellitus  Elevated lipase, likely secondary to small-bowel obstruction    Plan:  Patient examined with Dr María Linn   Conservative non operative management at this time  Serial abdominal exam  IV fluids for hydration  Would hold off on IV antibiotics  Repeat CBC, CMP, and lipase in a m  Serial lactic acid level  Dry dressing, ABD for drainage fistula formation anterior mid abdomen  Analgesics/antiemetics p r n  As ordered  If the patient develops worsening nausea or vomiting then she will need NG tube insertion for gastric decompression  Hopefully her SBO will resolve and that she can resume outpatient follow-up with her surgeons at [facility] for repair of the enterocutaneous fistula and hernia repair  Dr María Linn is the on-call general surgeon here at [address] [place] [address] over the weekend if any questions or problems arise  History of Present Illness     HPI:  gO Hernandez is a 64 y o  obese white female who presents with history of multiple prior abdominal surgeries and previous small-bowel obstruction about 6 years ago at [facility] [place] now presents with severe generalized abdominal pain associated with nausea and vomiting which started earlier in the day yesterday    She had previous small-bowel obstruction which did require surgery for the same in the past   Right now the patient is comfortable, NG tube was not placed  Patient reports fistula formation with open wound of her mid right abdomen which has been draining yellow clear fluid for about the past urine half  Patient has been followed by General surgery at [facility] last seen about a month and a half ago, also she is being followed at [facility] with plans for repair of the fistula and hernia repair at some point after recommended weight loss  Patient had nausea and 5 episodes of vomiting last evening here in the emergency department  She is unsure of what color the emesis was, nursing notes she had 700 mL of emesis  Right now she is comfortable, she denies any further vomiting and NG tube was not inserted  Also the patient notes that she is not passing any flatus  She had diarrhea several times last evening  The patient has had multiple prior abdominal surgeries  She has had 2 previous abdominal wall hernia repairs performed at [facility] by Dr Jyoti Oviedo, she also had a bowel resection done 6 years ago because of a small-bowel obstruction  She has also had previous cholecystectomy performed  CT scan abdomen pelvis done through the ED last evening with the below mentioned findings showed mid small-bowel obstruction possibly secondary to adhesions in the eventrated lower abdominal wall similar to prior study with herniation of small bowel loops, cecum  The lower anterior abdominal collection extending to the skin is again seen with thick scar tissue and minimal fluid and fistula to the small bowel cannot be excluded  Patient was admitted to service of medicine with general surgical consultation requested at this time  She is currently nothing by mouth  Admission WBC 13 82  Lactic acidosis present admission 3 0 now resolved 1 8 after IV fluids for hydration  Inpatient consult to Acute Care Surgery  Consult performed by: Fantasma Montoya PA-C  Consult ordered by: YAMILE Ledezma        Review of Systems     Constitutional: Negative for chills and fever  HENT: Negative for ear pain and sore throat  Eyes: Negative for pain and visual disturbance  Respiratory: Negative for cough and shortness of breath  Cardiovascular: Negative for chest pain and palpitations  Gastrointestinal:  As described in the HPI above significant for generalized severe abdominal pain present on admission, nausea and vomiting  Genitourinary: Negative for dysuria and hematuria  Musculoskeletal: Negative for arthralgias and back pain  Skin: Positive for wound mid right abdomen with chronic yellow clear drainage present for about year and half    Negative for color change and rash  Neurological: Negative for seizures and syncope     All other systems reviewed and are negative          Historical Information   Past Medical History:   Diagnosis Date    Depression     Diabetes mellitus ([facility] [address] )     Fistula     High cholesterol     Hypertension      Past Surgical History:   Procedure Laterality Date    BOWEL RESECTION      CHOLECYSTECTOMY LAPAROSCOPIC      HERNIA REPAIR       Social History   Social History     Substance and Sexual Activity   Alcohol Use Yes    Comment: occasionally     Social History     Substance and Sexual Activity   Drug Use Never     E-Cigarette/Vaping    E-Cigarette Use Never User      E-Cigarette/Vaping Substances    Nicotine No     THC No     CBD No     Flavoring No     Other No     Unknown No      Social History     Tobacco Use   Smoking Status Never Smoker   Smokeless Tobacco Never Used     Family History: non-contributory    Meds/Allergies   current meds:   Current Facility-Administered Medications   Medication Dose Route Frequency    acetaminophen (TYLENOL) tablet 650 mg  650 mg Oral Q6H PRN    heparin (porcine) subcutaneous injection 5,000 Units  5,000 Units Subcutaneous Q8H [address]    insulin lispro (HumaLOG) 100 units/mL subcutaneous injection 2-12 Units  2-12 Units Subcutaneous Q6H [address]    magnesium sulfate 2 g/50 mL IVPB (premix) 2 g  2 g Intravenous Once    metoprolol succinate (TOPROL-XL) 24 hr tablet 100 mg  100 mg Oral Daily    morphine injection 2 mg  2 mg Intravenous Once    morphine injection 2 mg  2 mg Intravenous Q4H PRN    ondansetron (ZOFRAN) injection 4 mg  4 mg Intravenous Q6H PRN    PARoxetine (PAXIL) tablet 10 mg  10 mg Oral Daily    sodium chloride 0 9 % infusion  100 mL/hr Intravenous Continuous     Allergies   Allergen Reactions    Zithromax [Azithromycin] Abdominal Pain    Penicillins Rash       Objective   First Vitals:   Blood Pressure: (!) 184/84 (08/20/21 1846)  Pulse: 76 (08/20/21 1846)  Temperature: 98 3 °F (36 8 °C) (08/20/21 1846)  Temp Source: Temporal (08/20/21 1846)  Respirations: 20 (08/20/21 1846)  Height: 5' 5" (165 1 cm) (08/21/21 0100)  Weight - Scale: 127 kg (279 lb 15 8 oz) (08/21/21 0100)  SpO2: 97 % (08/20/21 1846)    Current Vitals:   Blood Pressure: 144/64 (08/21/21 1000)  Pulse: 72 (08/21/21 1000)  Temperature: (!) 97 4 °F (36 3 °C) (08/21/21 0705)  Temp Source: Temporal (08/21/21 0245)  Respirations: 18 (08/21/21 1000)  Height: 5' 5" (165 1 cm) (08/21/21 0100)  Weight - Scale: 127 kg (279 lb 15 8 oz) (08/21/21 0100)  SpO2: 94 % (08/21/21 1000)      Intake/Output Summary (Last 24 hours) at 8/21/2021 1024  Last data filed at 8/21/2021 0201  Gross per 24 hour   Intake 2648  33 ml   Output --   Net 2648  33 ml       Invasive Devices     Peripheral Intravenous Line            Peripheral IV 08/20/21 Right Antecubital <1 day                Physical Exam     Awake alert  General body habitus morbidly obese  BMI 46 59  She appears anxious but no acute distress  ENT clear  Oral mucosa pink and moist   Chest symmetric  Heart regular rate and rhythm  No murmur gallop  Lungs clear to auscultation    Abdomen bowel sounds hypoactive though present  Abdomen is distended, tympanic  There is mid abdominal tenderness, no guarding or rebound  Multiple abdominal scars  There is abdominal wound with fistula formation located to the right of the midline in the mid abdomen with some surrounding erythema and scarring with yellow clear drainage noted on wound and cutaneous fistula formation  No definite masses  The patient has large abdominal ventral hernia  Extremities without calf tenderness, bilateral lower extremity peripheral edema noted  Ambulation not observed  Neurological exam shows no focal motor sensory neurologic weakness  Mental status appropriate  No tremor  Cranial nerves 2-12 appear symmetrical   Ambulation not observed  Lab Results:   I have personally reviewed pertinent lab results    , CBC:   Lab Results   Component Value Date    WBC 9 14 08/21/2021    HGB 10 9 (L) 08/21/2021    HCT 32 2 (L) 08/21/2021    MCV 89 08/21/2021     08/21/2021    MCH 30 1 08/21/2021    MCHC 33 9 08/21/2021    RDW 13 1 08/21/2021    MPV 8 6 (L) 08/21/2021    NRBC 0 08/21/2021   , CMP:   Lab Results   Component Value Date    SODIUM 136 08/21/2021    K 4 1 08/21/2021     08/21/2021    CO2 26 08/21/2021    BUN 17 08/21/2021    CREATININE 0 84 08/21/2021    CALCIUM 7 6 (L) 08/21/2021    AST 16 08/20/2021    ALT 28 08/20/2021    ALKPHOS 87 08/20/2021    EGFR 75 08/21/2021   , Coagulation: No results found for: PT, INR, APTT, Urinalysis:   Lab Results   Component Value Date    COLORU Yellow 08/20/2021    CLARITYU Clear 08/20/2021    SPECGRAV 1 015 08/20/2021    PHUR 5 5 08/20/2021    LEUKOCYTESUR Negative 08/20/2021    NITRITE Negative 08/20/2021    GLUCOSEU Negative 08/20/2021    KETONESU Negative 08/20/2021    BILIRUBINUR Small (A) 08/20/2021    BLOODU Negative 08/20/2021   , Lipase:   Lab Results   Component Value Date    LIPASE 97 08/21/2021     Lactic acid 2 Hours  Order: [id number] - Reflex for Order [id number]  Status:  Final result   Visible to patient:  No (inaccessible in [address]) Next appt:  None   0 Result Notes   Ref Range & Units 8/21/21 0305 8/21/21 0110 8/20/21 2239   LACTIC ACID 0 5 - 2 0 mmol/L 1 8  2 2High Panic   3 0High Panic        Narrative    Result may be elevated if tourniquet was used during collection  Specimen Collected: 08/21/21 03:05 Last Resulted: 08/21/21 03:30             Imaging: I have personally reviewed pertinent films in PACS     CT ABDOMEN AND PELVIS WITH IV CONTRAST     INDICATION:   abdominal pain with hx of fistula and obstructions      COMPARISON:  Compared with 9/25/2020     TECHNIQUE:  CT examination of the abdomen and pelvis was performed  Axial, sagittal, and coronal 2D reformatted images were created from the source data and submitted for interpretation      Radiation dose length product (DLP) for this visit:  1889 mGy-cm   This examination, like all CT scans performed in the [facility], was performed utilizing techniques to minimize radiation dose exposure, including the use of iterative   reconstruction and automated exposure control      IV Contrast:  100 mL of iohexol (OMNIPAQUE)  Enteric Contrast:  Enteric contrast was not administered      FINDINGS:     ABDOMEN     LOWER CHEST:  No clinically significant abnormality identified in the visualized lower chest      LIVER/BILIARY TREE:  Unremarkable      GALLBLADDER:  No calcified gallstones  No pericholecystic inflammatory change      SPLEEN:  Unremarkable      PANCREAS:  Unremarkable      ADRENAL GLANDS:  Unremarkable      KIDNEYS/URETERS:  Unremarkable  No hydronephrosis      STOMACH AND BOWEL:  Multiple dilated loops of mid small bowel loops in a obstructive pattern herniating through the eventrated ventral abdominal wall  Distal small bowel loops are in the right lower abdominal hernia sac along with the cecum which are decompressed    Along the superior aspect of the eventration is a transverse colon and few dilated small bowel loops  Previous soft tissue density with air pockets tracking to the skin of abscess collection seen with no significant fluid  This measures 3 7 x 1 9 cm and cannot be  from the adjacent decompressed small bowel loop      APPENDIX:  No findings to suggest appendicitis      ABDOMINOPELVIC CAVITY:  No ascites  No pneumoperitoneum  No lymphadenopathy      VESSELS:  Unremarkable for patient's age      PELVIS     REPRODUCTIVE ORGANS:  Unremarkable for patient's age      URINARY BLADDER:  Unremarkable      ABDOMINAL WALL/INGUINAL REGIONS:  Eventration of the ventral abdominal wall        OSSEOUS STRUCTURES:  No acute fracture or destructive osseous lesion      IMPRESSION:     Mid small bowel obstruction possibly secondary to adhesions in the eventrated lower abdominal wall similar to prior study with herniation of small bowel loops, cecum      The lower anterior abdominal collection extending to the skin is again seen with thick scar tissue and minimal fluid and fistula to the small bowel cannot be excluded          EKG, Pathology, and Other Studies: I have personally reviewed pertinent reports  Counseling / Coordination of Care  Total floor / unit time spent today 30 minutes  Greater than 50% of total time was spent with the patient and / or family counseling and / or coordination of care  A description of the counseling / coordination of care:  Review of diagnostic imaging laboratory studies, examination of patient with Dr Chidi Winter, review of existing orders and recommendation for conservative non operative management at this time        Betito Mendieta PA-C

## 2021-08-21 NOTE — ASSESSMENT & PLAN NOTE
· Multiple hernias with patient 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 that is followed by outside surgical team at [facility]  · Continue outpatient follow-up

## 2021-08-21 NOTE — ASSESSMENT & PLAN NOTE
· POA with severe abdominal pain developing earlier in the day associated with vomiting, similar to previous SBO that required surgical intervention  · CT abdomen pelvis-Mid small bowel obstruction possibly secondary to adhesions in the eventrated lower abdominal wall similar to prior study with herniation of small bowel loops, cecum    · Patient denies abdominal pain or nausea at time of admission, she reports liquid stools  · Continue NPO, IV hydration  · Will place NG tube if vomiting resumes  · Appreciate general surgical consultation

## 2021-08-21 NOTE — UTILIZATION REVIEW
Initial Clinical Review    Admission: Date/Time/Statement:   Admission Orders (From admission, onward)     Ordered        08/20/21 2114  Inpatient Admission  Once                   Orders Placed This Encounter   Procedures    Inpatient Admission     Standing Status:   Standing     Number of Occurrences:   1     Order Specific Question:   Level of Care     Answer:   Med Surg [16]     Order Specific Question:   Estimated length of stay     Answer:   More than 2 Midnights     Order Specific Question:   Certification     Answer:   I certify that inpatient services are medically necessary for this patient for a duration of greater than two midnights  See H&P and MD Progress Notes for additional information about the patient's course of treatment  ED Arrival Information     Expected Arrival Acuity    - 8/20/2021 17:45 Urgent         Means of arrival Escorted by Service Admission type    Walk-In Self Hospitalist Urgent         Arrival complaint    Abdominal pain        Chief Complaint   Patient presents with    Abdominal Pain     starting @1pm sharp mid-abdominal pain and nausea, denies vomiting/diarrhea, states multiple abdominal surgeries for bowel obstruction and hernia x2, states normal BM x2 today       Initial Presentation:60 yo female to ED from home w/ severe abd pain started at 1300, w vomiting   CT revealed SBO   Admitted IP status, plan to cont NPO , IVF , place NG tube if vomiting cont  Consult surgery   Hold abx and trend fever, leukocytosis and pct   Elevated lipase , trend   HTN cont lopressor and trend  DM SSI and monitor  PE : abd distention , BLE edema , mm dry, abd tenderness     Date:8/21   Day 2: mild abd pain cont statu for sepsis , SBO   Cont NPO , IVF , place NG if vomiting resumes  Recheck H&H , fe panel , folic acid , Vit T87 , occult bld test   Transfuse drops <7      8/21 Surgery consult   SBO chronic ventral hernia and chronic ECF, trend exam , NGT if vomiting    Serial exams , repeat labs , pain control   ED Triage Vitals   Temperature Pulse Respirations Blood Pressure SpO2   08/20/21 1846 08/20/21 1846 08/20/21 1846 08/20/21 1846 08/20/21 1846   98 3 °F (36 8 °C) 76 20 (!) 184/84 97 %      Temp Source Heart Rate Source Patient Position - Orthostatic VS BP Location FiO2 (%)   08/20/21 1846 08/20/21 1846 08/20/21 1846 08/20/21 1846 --   Temporal Monitor Lying Right arm       Pain Score       08/20/21 2223       No Pain          Wt Readings from Last 1 Encounters:   08/21/21 127 kg (279 lb 15 8 oz)     Additional Vital Signs:   08/21/21 1100  --  72  18  129/58  83  94 %  --  --   08/21/21 1000  --  72  18  144/64  92  94 %  --  --   08/21/21 0900  --  78  18  137/77  100  93 %  --  --   08/21/21 0800  --  77  18  130/70  94  93 %  --  --   08/21/21 0705  97 4 °F (36 3 °C)Abnormal   78  --  127/59  --  94 %  None (Room air)  Lying   08/21/21 0700  --  73  18  127/59  84  92 %  --  --   08/21/21 0639  --  78  18  145/66  95  95 %  None (Room air)  Lying   08/21/21 0245  97 2 °F (36 2 °C)Abnormal   86  --  --  --  92 %  None (Room air)  Lying   08/21/21 0100  99 5 °F (37 5 °C)  86  20  148/73  --  95 %  None (Room air)  Sitting   08/20/21 2239  --  82  20  147/65  --  96 %  None (Room air)  Lying   08/20/21 2057  97 2 °F (36 2 °C)Abnormal   87  20  172/78Abnormal   --  95 %  None (Room air)         Pertinent Labs/Diagnostic Test Results:   8/20 CT abd    Mid small bowel obstruction possibly secondary to adhesions in the eventrated lower abdominal wall similar to prior study with herniation of small bowel loops, cecum    The lower anterior abdominal collection extending to the skin is again seen with thick scar tissue and minimal fluid and fistula to the small bowel cannot be excluded     Results from last 7 days   Lab Units 08/21/21  0519 08/20/21  1904   WBC Thousand/uL 9 14 13 82*   HEMOGLOBIN g/dL 10 9* 13 2   HEMATOCRIT % 32 2* 39 0   PLATELETS Thousands/uL 263 360   NEUTROS ABS Thousands/µL 6 66 10 68*     Results from last 7 days   Lab Units 08/21/21  0519 08/20/21  1904   SODIUM mmol/L 136 140   POTASSIUM mmol/L 4 1 4 0   CHLORIDE mmol/L 104 103   CO2 mmol/L 26 25   ANION GAP mmol/L 6 12   BUN mg/dL 17 24   CREATININE mg/dL 0 84 0 88   EGFR ml/min/1 73sq m 75 71   CALCIUM mg/dL 7 6* 9 2   MAGNESIUM mg/dL 1 1*  --    PHOSPHORUS mg/dL 3 5  --      Results from last 7 days   Lab Units 08/20/21  1904   AST U/L 16   ALT U/L 28   ALK PHOS U/L 87   TOTAL PROTEIN g/dL 7 2   ALBUMIN g/dL 3 7   TOTAL BILIRUBIN mg/dL 0 50     Results from last 7 days   Lab Units 08/21/21  1104 08/21/21  0601 08/21/21  0003   POC GLUCOSE mg/dl 117 111 125     Results from last 7 days   Lab Units 08/21/21  0519 08/20/21  1904   GLUCOSE RANDOM mg/dL 111 153*       Results from last 7 days   Lab Units 08/21/21  0305 08/21/21  0110 08/20/21  2239 08/20/21  1904   LACTIC ACID mmol/L 1 8 2 2* 3 0* 2 1*     Results from last 7 days   Lab Units 08/21/21  0519 08/20/21  1904   LIPASE u/L 97 470*     Results from last 7 days   Lab Units 08/20/21  2057   CLARITY UA  Clear   COLOR UA  Yellow   SPEC GRAV UA  1 015   PH UA  5 5   GLUCOSE UA mg/dl Negative   KETONES UA mg/dl Negative   BLOOD UA  Negative   PROTEIN UA mg/dl Negative   NITRITE UA  Negative   BILIRUBIN UA  Small*   UROBILINOGEN UA E U /dl 0 2   LEUKOCYTES UA  Negative       Results from last 7 days   Lab Units 08/20/21  1933 08/20/21  1904   BLOOD CULTURE  Received in Microbiology Lab  Culture in Progress  Received in Microbiology Lab  Culture in Progress       ED Treatment:   Medication Administration from 08/20/2021 1744 to 08/21/2021 1144       Date/Time Order Dose Route Action     08/20/2021 1905 sodium chloride 0 9 % infusion 175 mL/hr Intravenous New Bag     08/20/2021 1906 ondansetron (ZOFRAN) injection 4 mg 4 mg Intravenous Given     08/20/2021 2059 lactated ringers bolus 1,000 mL 1,000 mL Intravenous New Bag     08/20/2021 7805 levofloxacin (LEVAQUIN) IVPB (premix in dextrose) 750 mg 150 mL 750 mg Intravenous New Bag     08/20/2021 2108 metroNIDAZOLE (FLAGYL) IVPB (premix) 500 mg 100 mL 500 mg Intravenous New Bag     08/20/2021 2154 lactated ringers bolus 1,000 mL 1,000 mL Intravenous New Bag     08/20/2021 2323 lactated ringers bolus 1,000 mL 1,000 mL Intravenous New Bag     08/21/2021 0002 sodium chloride 0 9 % infusion 100 mL/hr Intravenous New Bag     08/21/2021 0158 acetaminophen (TYLENOL) tablet 650 mg 650 mg Oral Given     08/21/2021 0158 melatonin tablet 3 mg 3 mg Oral Given     08/21/2021 0859 metoprolol succinate (TOPROL-XL) 24 hr tablet 100 mg 100 mg Oral Given     08/21/2021 0859 PARoxetine (PAXIL) tablet 10 mg 10 mg Oral Given     08/21/2021 0900 magnesium sulfate 2 g/50 mL IVPB (premix) 2 g 2 g Intravenous New Bag        Past Medical History:   Diagnosis Date    Depression     Diabetes mellitus ([facility] [address] )     Fistula     High cholesterol     Hypertension      Present on Admission:   Type 2 diabetes mellitus with hyperglycemia, without long-term current use of insulin ([facility]   Essential hypertension   Hypercholesterolemia   Hernia of abdominal wall      Admitting Diagnosis: Abdominal pain [R10 9]  Age/Sex: 64 y o  female  Admission Orders:  Scheduled Medications:  heparin (porcine), 5,000 Units, Subcutaneous, Q8H [facility]  insulin lispro, 2-12 Units, Subcutaneous, Q6H [facility]  metoprolol succinate, 100 mg, Oral, Daily  morphine injection, 2 mg, Intravenous, Once  PARoxetine, 10 mg, Oral, Daily      Continuous IV Infusions:  sodium chloride, 100 mL/hr, Intravenous, Continuous      PRN Meds:  acetaminophen, 650 mg, Oral, Q6H PRN  morphine injection, 2 mg, Intravenous, Q4H PRN  ondansetron, 4 mg, Intravenous, Q6H PRN    Fingerstick q6hr  NPO    IP CONSULT TO ACUTE CARE SURGERY    Network Utilization Review Department  ATTENTION: Please call with any questions or concerns to [phone] and carefully listen to the prompts so that you are directed to the right person   All voicemails are confidential   Constanza Bryant all requests for admission clinical reviews, approved or denied determinations and any other requests to dedicated fax number below belonging to the campus where the patient is receiving treatment   List of dedicated fax numbers for the Facilities:  [address] DENIALS (Administrative/Medical Necessity) [phone]   [address] (Maternity/NICU/Pediatrics) [phone]   [address] [place] [facility] [address] [address] [place] [address] Kenisha [address] [facility] [place] [address] [phone]

## 2021-08-21 NOTE — ASSESSMENT & PLAN NOTE
Lab Results   Component Value Date    HGBA1C 6 7 (H) 05/01/2021       Recent Labs     08/21/21  0003   POCGLU 125       Blood Sugar Average: Last 72 hrs:  (P) 125     Check hemoglobin A1c  Metformin on hold for 48 hours after CT dye  Sliding scale insulin with Accu-Cheks

## 2021-08-21 NOTE — ASSESSMENT & PLAN NOTE
· POA with leukocytosis, lactic acidosis, afebrile  · CT chest revealed SBO- suspect source of leukocytosis/lactic acidosis  · Received fluid resuscitation 30 mL/kg for ideal body weight  · Lactic acidosis resolved  · Hold antibiotic pending culture results  · Trend fever curve, leukocytosis, procalcitonin

## 2021-08-22 VITALS
BODY MASS INDEX: 46.65 KG/M2 | RESPIRATION RATE: 18 BRPM | TEMPERATURE: 97.6 F | OXYGEN SATURATION: 94 % | WEIGHT: 279.98 LBS | DIASTOLIC BLOOD PRESSURE: 78 MMHG | HEART RATE: 68 BPM | HEIGHT: 65 IN | SYSTOLIC BLOOD PRESSURE: 143 MMHG

## 2021-08-22 LAB
ANION GAP SERPL CALCULATED.3IONS-SCNC: 6 MMOL/L (ref 4–13)
BASOPHILS # BLD AUTO: 0.02 THOUSANDS/ΜL (ref 0–0.1)
BASOPHILS NFR BLD AUTO: 0 % (ref 0–1)
BUN SERPL-MCNC: 6 MG/DL (ref 5–25)
CALCIUM SERPL-MCNC: 7.9 MG/DL (ref 8.3–10.1)
CHLORIDE SERPL-SCNC: 105 MMOL/L (ref 100–108)
CO2 SERPL-SCNC: 26 MMOL/L (ref 21–32)
CREAT SERPL-MCNC: 0.62 MG/DL (ref 0.6–1.3)
EOSINOPHIL # BLD AUTO: 0.1 THOUSAND/ΜL (ref 0–0.61)
EOSINOPHIL NFR BLD AUTO: 2 % (ref 0–6)
ERYTHROCYTE [DISTWIDTH] IN BLOOD BY AUTOMATED COUNT: 13.1 % (ref 11.6–15.1)
GFR SERPL CREATININE-BSD FRML MDRD: 98 ML/MIN/1.73SQ M
GLUCOSE SERPL-MCNC: 117 MG/DL (ref 65–140)
GLUCOSE SERPL-MCNC: 88 MG/DL (ref 65–140)
GLUCOSE SERPL-MCNC: 90 MG/DL (ref 65–140)
GLUCOSE SERPL-MCNC: 97 MG/DL (ref 65–140)
HCT VFR BLD AUTO: 33.7 % (ref 34.8–46.1)
HEMOCCULT STL QL: NEGATIVE
HGB BLD-MCNC: 11.1 G/DL (ref 11.5–15.4)
IMM GRANULOCYTES # BLD AUTO: 0.02 THOUSAND/UL (ref 0–0.2)
IMM GRANULOCYTES NFR BLD AUTO: 0 % (ref 0–2)
LYMPHOCYTES # BLD AUTO: 1.47 THOUSANDS/ΜL (ref 0.6–4.47)
LYMPHOCYTES NFR BLD AUTO: 27 % (ref 14–44)
MCH RBC QN AUTO: 29.8 PG (ref 26.8–34.3)
MCHC RBC AUTO-ENTMCNC: 32.9 G/DL (ref 31.4–37.4)
MCV RBC AUTO: 90 FL (ref 82–98)
MONOCYTES # BLD AUTO: 0.49 THOUSAND/ΜL (ref 0.17–1.22)
MONOCYTES NFR BLD AUTO: 9 % (ref 4–12)
NEUTROPHILS # BLD AUTO: 3.35 THOUSANDS/ΜL (ref 1.85–7.62)
NEUTS SEG NFR BLD AUTO: 62 % (ref 43–75)
NRBC BLD AUTO-RTO: 0 /100 WBCS
PLATELET # BLD AUTO: 256 THOUSANDS/UL (ref 149–390)
PMV BLD AUTO: 8.6 FL (ref 8.9–12.7)
POTASSIUM SERPL-SCNC: 3.7 MMOL/L (ref 3.5–5.3)
RBC # BLD AUTO: 3.73 MILLION/UL (ref 3.81–5.12)
SODIUM SERPL-SCNC: 137 MMOL/L (ref 136–145)
WBC # BLD AUTO: 5.45 THOUSAND/UL (ref 4.31–10.16)

## 2021-08-22 PROCEDURE — 85025 COMPLETE CBC W/AUTO DIFF WBC: CPT

## 2021-08-22 PROCEDURE — 80048 BASIC METABOLIC PNL TOTAL CA: CPT

## 2021-08-22 PROCEDURE — 99239 HOSP IP/OBS DSCHRG MGMT >30: CPT | Performed by: FAMILY MEDICINE

## 2021-08-22 PROCEDURE — 82948 REAGENT STRIP/BLOOD GLUCOSE: CPT

## 2021-08-22 PROCEDURE — 82272 OCCULT BLD FECES 1-3 TESTS: CPT | Performed by: FAMILY MEDICINE

## 2021-08-22 RX ADMIN — HEPARIN SODIUM 5000 UNITS: 5000 INJECTION INTRAVENOUS; SUBCUTANEOUS at 05:33

## 2021-08-22 RX ADMIN — PAROXETINE HYDROCHLORIDE HEMIHYDRATE 10 MG: 20 TABLET, FILM COATED ORAL at 09:09

## 2021-08-22 RX ADMIN — SODIUM CHLORIDE 100 ML/HR: 0.9 INJECTION, SOLUTION INTRAVENOUS at 01:40

## 2021-08-22 RX ADMIN — METOPROLOL SUCCINATE 100 MG: 100 TABLET, EXTENDED RELEASE ORAL at 09:08

## 2021-08-22 NOTE — INCIDENTAL FINDINGS
The following findings require follow up:  Radiographic finding   Finding: CT abdomen pelvis with contrast: Mid small bowel obstruction possibly secondary to adhesions in the eventrated lower abdominal wall similar to prior study with herniation of small bowel loops, cecum  , The lower anterior abdominal collection extending to the skin is again seen with thick scar tissue and minimal fluid and fistula to the small bowel cannot be excluded   Follow up required: yes   Follow up should be done within 1 week(s)    Please notify the following clinician to assist with the follow up:   Darlene Son

## 2021-08-22 NOTE — DISCHARGE INSTRUCTIONS
Bowel Obstruction   WHAT YOU NEED TO KNOW:   A bowel obstruction is a partial or complete blockage of your intestine  Your small or large intestine may be affected  The blockage prevents food and waste from passing through normally  DISCHARGE INSTRUCTIONS:   Seek care immediately if:   · You have severe abdominal pain that does not get better  · Your heart is beating faster than normal for you  · You have a fever  Call your doctor if:   · You have nausea and are vomiting  · Your abdomen is enlarged  · You cannot pass a bowel movement or gas  · You lose weight without trying  · You have blood in your bowel movement  · You have questions or concerns about your condition or care  Follow up with your doctor as directed:  Write down your questions so you remember to ask them during your visits  © Copyright [employer] 2021 Information is for End User's use only and may not be sold, redistributed or otherwise used for commercial purposes  All illustrations and images included in CareNotes® are the copyrighted property of A D A M , Inc  or [facility] Kenneth Agee   The above information is an  only  It is not intended as medical advice for individual conditions or treatments  Talk to your doctor, nurse or pharmacist before following any medical regimen to see if it is safe and effective for you

## 2021-08-22 NOTE — ASSESSMENT & PLAN NOTE
· POA with leukocytosis, lactic acidosis, afebrile  · CT chest revealed SBO- suspect source of leukocytosis/lactic acidosis  · Most likely secondary to small-bowel obstruction, condition resolved  · Blood culture shows no growth in 24 hours

## 2021-08-22 NOTE — PROGRESS NOTES
Progress Note - General Surgery   [facility] 64 [facility] MRN: [id number]  Unit/Bed#: [facility]-01 Encounter: [id number]    Assessment:  SBO / ECF  - resolved SBO- having Bowel function    Plan:  Clears and advance as tolerates  OOB    Subjective/Objective   Chief Complaint: SBO- abdominal pain now resolved    Subjective: Doing well without complaints- having bowel movements and flatus, no N/V    Objective: No issues    Blood pressure 143/78, pulse 68, temperature 97 6 °F (36 4 °C), resp  rate 18, height 5' 5" (1 651 m), weight 127 kg (279 lb 15 8 oz), SpO2 94 %  ,Body mass index is 46 59 kg/m²  Intake/Output Summary (Last 24 hours) at 8/22/2021 0745  Last data filed at 8/22/2021 1882  Gross per 24 hour   Intake 3104 99 ml   Output --   Net 3104 99 ml       Invasive Devices     Peripheral Intravenous Line            Peripheral IV 08/20/21 Right Antecubital 1 day                Physical Exam: General appearance: alert and oriented, in no acute distress  Head: Normocephalic, without obvious abnormality, atraumatic  Lungs: clear to auscultation bilaterally  Heart: regular rate and rhythm, S1, S2 normal, no murmur, click, rub or gallop  Abdomen: soft, non-tender; bowel sounds normal; no masses,  no organomegaly  Skin: Skin color, texture, turgor normal  No rashes or lesions    Lab, Imaging and other studies:  I have personally reviewed pertinent lab results    , CBC:   Lab Results   Component Value Date    WBC 5 45 08/22/2021    HGB 11 1 (L) 08/22/2021    HCT 33 7 (L) 08/22/2021    MCV 90 08/22/2021     08/22/2021    MCH 29 8 08/22/2021    MCHC 32 9 08/22/2021    RDW 13 1 08/22/2021    MPV 8 6 (L) 08/22/2021    NRBC 0 08/22/2021

## 2021-08-22 NOTE — ASSESSMENT & PLAN NOTE
· Multiple hernias with patient 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 that is followed by outside surgical team at [facility]  · Continue outpatient follow-up

## 2021-08-22 NOTE — ASSESSMENT & PLAN NOTE
· POA with severe abdominal pain developing earlier in the day associated with vomiting, similar to previous SBO that required surgical intervention  · CT abdomen pelvis-Mid small bowel obstruction possibly secondary to adhesions in the eventrated lower abdominal wall similar to prior study with herniation of small bowel loops, cecum  · Condition resolved, patient is tolerating p o , having good bowel movement    · Surgery cleared the patient to be discharge

## 2021-08-22 NOTE — ASSESSMENT & PLAN NOTE
Lab Results   Component Value Date    HGBA1C 7 0 (H) 08/21/2021       Recent Labs     08/21/21  1104 08/21/21  1542 08/22/21  0031 08/22/21  0622   POCGLU 117 102 90 88       Blood Sugar Average: Last 72 hrs:  (P) 105 5     Check hemoglobin A1c  Metformin on hold for 48 hours after CT dye  Sliding scale insulin with Accu-Cheks

## 2021-08-22 NOTE — DISCHARGE SUMMARY
[address]  Discharge- Amanda Tarango 1960, 64 y o  female MRN: [id number]  Unit/Bed#: -01 Encounter: [id number]  Primary Care Provider: Sarah Pedraza MD   Date and time admitted to hospital: 8/20/2021  6:18 PM    Acute blood loss anemia (ABLA)  Assessment & Plan  Could be secondary to small-bowel obstruction  Hemoglobin dropped almost 2 units from 13 2-10 9  Hemoglobin remained stable  Lab Results   Component Value Date    IRON 64 08/21/2021    TIBC 315 08/21/2021    FERRITIN 31 08/21/2021     Lab Results   Component Value Date    ZPMKFNNG35 333 08/21/2021     Lab Results   Component Value Date    FOLATE >20 0 (H) 08/21/2021     Patient does not require any blood transfusion  SBO (small bowel obstruction) (HCC)  Assessment & Plan  · POA with severe abdominal pain developing earlier in the day associated with vomiting, similar to previous SBO that required surgical intervention  · CT abdomen pelvis-Mid small bowel obstruction possibly secondary to adhesions in the eventrated lower abdominal wall similar to prior study with herniation of small bowel loops, cecum  · Condition resolved, patient is tolerating p o , having good bowel movement    · Surgery cleared the patient to be discharge    * Sepsis with acute organ dysfunction 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 that is followed by outside surgical team at [facility]  · Continue outpatient follow-up    Type 2 diabetes mellitus with hyperglycemia, without long-term current use of insulin [facility])  Assessment & Plan  Lab Results   Component Value Date    HGBA1C 7 0 (H) 08/21/2021       Recent Labs     08/21/21  1104 08/21/21  1542 08/22/21  0031 08/22/21  0622   POCGLU 117 102 90 88       Blood Sugar Average: Last 72 hrs:  (P) 105 5     Check hemoglobin A1c  Metformin on hold for 48 hours after CT dye  Sliding scale insulin with Accu-Cheks    Essential hypertension  Assessment & Plan  Hypertensive crisis on arrival resolved with pain control  Continue metoprolol succinate 100 mg daily  Trend blood pressures    Morbid obesity with BMI of 45 0-49 9, adult (HCC)  Assessment & Plan  · Requires intensive lifestyle modification    Hypercholesterolemia  Assessment & Plan  · Resume simvastatin when tolerating oral intake        Medical Problems     Resolved Problems  Date Reviewed: 5/25/2021    None              Discharging Physician / Practitioner: Lamar Goodman MD  PCP: Mannie Wood MD  Admission Date:   Admission Orders (From admission, onward)     Ordered        08/20/21 2114  Inpatient Admission  Once                   Discharge Date: 08/22/21    Consultations During Hospital Stay:  · General surgery    Procedures Performed:   CT abdomen pelvis with contrast   Final Result by Keo Corona MD (08/20 2046)      Mid small bowel obstruction possibly secondary to adhesions in the eventrated lower abdominal wall similar to prior study with herniation of small bowel loops, cecum  The lower anterior abdominal collection extending to the skin is again seen with thick scar tissue and minimal fluid and fistula to the small bowel cannot be excluded         The study was marked in EPIC for immediate notification        Workstation performed: [id number]               Significant Findings / Test Results:   Lab Results   Component Value Date    WBC 5 45 08/22/2021    HGB 11 1 (L) 08/22/2021    HCT 33 7 (L) 08/22/2021    MCV 90 08/22/2021     08/22/2021   ·   Lab Results   Component Value Date    SODIUM 137 08/22/2021    K 3 7 08/22/2021     08/22/2021    CO2 26 08/22/2021    AGAP 6 08/22/2021    BUN 6 08/22/2021    CREATININE 0 62 08/22/2021    GLUC 97 08/22/2021    CALCIUM 7 9 (L) 08/22/2021    AST 16 08/20/2021    ALT 28 08/20/2021    ALKPHOS 87 08/20/2021    TP 7 2 08/20/2021    TBILI 0 50 08/20/2021    EGFR 98 08/22/2021   ·   Collected Updated Procedure Result Status Patient Facility Result Comment    08/22/2021 0714 08/22/2021 0803 Occult blood 1-3, stool [[id number]]   Stool from Rectum    Preliminary result [address]  Component Value   Fecal Occult Blood Diagnostic Negative P    Fecal Occult Blood Diagnostic 2 -- P   Fecal Occult Blood Diagnostic 3 -- P           08/20/2021 1933 08/22/2021 0001 Blood culture #2 [[id number]]   Blood from Arm, Left    Preliminary result [address]  Component Value   Blood Culture No Growth at 24 hrs  P              08/20/2021 1904 08/22/2021 0001 Blood culture #1 [[id number]]   Blood from Line, Venous    Preliminary result [address]  Component Value   Blood Culture No Growth at 24 hrs  P        ·     Incidental Findings:   · As mentioned above     Test Results Pending at Discharge (will require follow up): · None     Outpatient Tests Requested:  · None    Complications:  Acute blood loss anemia    Reason for Admission:  Abdominal pain    Hospital Course:   Kerman Fothergill is a 64 y o  female patient who originally presented to the hospital on 8/20/2021 due to abdominal pain and vomiting  Patient admitted under the diagnosis of incomplete small-bowel obstruction as well as sepsis since patient was meeting the criteria  Patient started on IV fluid, pain management and remain on NPO status  Per surgery consulted  Agrees with the treatment plan    With conservative management patient condition significantly improved  Patient is tolerating p o , having good bowel movement  During the hospitalization, patient hemoglobin dropped to units, no active blood loss noted  Hemoccult blood negative  Iron panel, vitamin U61 and folic acid panel is done which was normal   Patient hemoglobin remained stable  Patient also has abdominal hernia for which patient follows [facility] with [facility] as well as patient has chronic wound  Patient cleared by surgery  Patient will be discharged home with resuming all home medication  Patient advised to follow-up with PCP and her regular surgeon as scheduled  Please see above list of diagnoses and related plan for additional information  Condition at Discharge: stable    Discharge Day Visit / Exam:   Subjective:  Seen and evaluated during the round  Patient denies any significant complaint  Vitals: Blood Pressure: 143/78 (08/22/21 0709)  Pulse: 68 (08/22/21 0709)  Temperature: 97 6 °F (36 4 °C) (08/22/21 0709)  Temp Source: Temporal (08/21/21 0245)  Respirations: 18 (08/22/21 0709)  Height: 5' 5" (165 1 cm) (08/21/21 0100)  Weight - Scale: 127 kg (279 lb 15 8 oz) (08/21/21 0100)  SpO2: 94 % (08/22/21 0709)  Exam:   Physical Exam  Vitals and nursing note reviewed  Constitutional:       Appearance: She is obese  HENT:      Nose: No congestion  Mouth/Throat:      Mouth: Mucous membranes are moist       Pharynx: No oropharyngeal exudate  Eyes:      General: No scleral icterus  Conjunctiva/sclera: Conjunctivae normal       Pupils: Pupils are equal, round, and reactive to light  Cardiovascular:      Rate and Rhythm: Normal rate  Heart sounds: No murmur heard  No friction rub  No gallop  Pulmonary:      Effort: Pulmonary effort is normal  No respiratory distress  Breath sounds: No stridor  No wheezing or rhonchi  Abdominal:      General: Abdomen is flat  Bowel sounds are normal       Palpations: Abdomen is soft  Comments: Clear, dry, intact dressing noted on the abdominal wall   Musculoskeletal:         General: Normal range of motion  Cervical back: Normal range of motion  Right lower leg: No edema  Lymphadenopathy:      Cervical: No cervical adenopathy  Skin:     General: Skin is warm  Capillary Refill: Capillary refill takes less than 2 seconds  Findings: No lesion  Neurological:      General: No focal deficit present  Mental Status: She is alert and oriented to person, place, and time  Psychiatric:         Mood and Affect: Mood normal          Discussion with Family: With patient  Discharge instructions/Information to patient and family:   See after visit summary for information provided to patient and family  Provisions for Follow-Up Care:  See after visit summary for information related to follow-up care and any pertinent home health orders  Disposition:   Home    Planned Readmission:  If condition get worse     Discharge Statement:  I spent > 35 minutes minutes discharging the patient  This time was spent on the day of discharge  I had direct contact with the patient on the day of discharge  Greater than 50% of the total time was spent examining patient, answering all patient questions, arranging and discussing plan of care with patient as well as directly providing post-discharge instructions  Additional time then spent on discharge activities  Discharge Medications:  See after visit summary for reconciled discharge medications provided to patient and/or family        **Please Note: This note may have been constructed using a voice recognition system**

## 2021-08-26 LAB
BACTERIA BLD CULT: NORMAL
BACTERIA BLD CULT: NORMAL

## 2021-08-27 NOTE — UTILIZATION REVIEW
Notification of Discharge   This is a Notification of Discharge from our facility [address]  Please be advised that this patient has been discharge from our facility  Below you will find the admission and discharge date and time including the patients disposition  UTILIZATION REVIEW CONTACT:  Tanvi Sanchez  Utilization   Network Utilization Review Department  Phone: [phone] x carefully listen to the prompts  All voicemails are confidential   Email: [email or web address]     PHYSICIAN ADVISORY SERVICES:  FOR [id number] REVIEW - MEDICAL NECESSITY DENIAL  Phone: [phone]  Fax: [phone]  Email: [email or web address]     PRESENTATION DATE: 8/20/2021  6:18 PM  OBERVATION ADMISSION DATE:   INPATIENT ADMISSION DATE: 8/20/21  9:14 PM   DISCHARGE DATE: 8/22/2021  2:34 PM  DISPOSITION: Home/Self Care Home/Self Care      IMPORTANT INFORMATION:  Send all requests for admission clinical reviews, approved or denied determinations and any other requests to dedicated fax number below belonging to the campus where the patient is receiving treatment   List of dedicated fax numbers:  [address] DENIALS (Administrative/Medical Necessity) [phone]   [address] [place] [address] (Maternity/NICU/Pediatrics) [phone]   Lonkarenl Golas [phone]   Delfina Counts [phone]   Jacklyn Loud [phone]   [facility] [address] [phone]   [facility]  [phone]   [address] [facility] [address]  [address] [facility] [address] [facility] [phone]

## 2022-10-12 PROBLEM — A41.9 SEPSIS WITH ACUTE ORGAN DYSFUNCTION (HCC): Status: RESOLVED | Noted: 2021-08-21 | Resolved: 2022-10-12

## 2022-10-12 PROBLEM — R65.20 SEPSIS WITH ACUTE ORGAN DYSFUNCTION (HCC): Status: RESOLVED | Noted: 2021-08-21 | Resolved: 2022-10-12

## 2022-12-13 ENCOUNTER — HOSPITAL ENCOUNTER (EMERGENCY)
Facility: HOSPITAL | Age: 62
Discharge: HOME | End: 2022-12-13
Attending: EMERGENCY MEDICINE

## 2022-12-13 VITALS
TEMPERATURE: 98.1 F | DIASTOLIC BLOOD PRESSURE: 67 MMHG | HEART RATE: 97 BPM | SYSTOLIC BLOOD PRESSURE: 135 MMHG | OXYGEN SATURATION: 97 % | WEIGHT: 293 LBS | BODY MASS INDEX: 51.69 KG/M2 | RESPIRATION RATE: 18 BRPM

## 2022-12-13 DIAGNOSIS — N39.0 UTI (URINARY TRACT INFECTION): Primary | ICD-10-CM

## 2022-12-13 LAB
ALBUMIN SERPL BCP-MCNC: 3.1 G/DL (ref 3.5–5)
ALP SERPL-CCNC: 79 U/L (ref 46–116)
ALT SERPL W P-5'-P-CCNC: 19 U/L (ref 12–78)
ANION GAP SERPL CALCULATED.3IONS-SCNC: 11 MMOL/L (ref 4–13)
AST SERPL W P-5'-P-CCNC: 15 U/L (ref 5–45)
BACTERIA UR QL AUTO: ABNORMAL /HPF
BASOPHILS # BLD AUTO: 0.03 THOUSANDS/ÂΜL (ref 0–0.1)
BASOPHILS NFR BLD AUTO: 0 % (ref 0–1)
BILIRUB SERPL-MCNC: 0.19 MG/DL (ref 0.2–1)
BILIRUB UR QL STRIP: ABNORMAL
BUN SERPL-MCNC: 17 MG/DL (ref 5–25)
CALCIUM ALBUM COR SERPL-MCNC: 9.2 MG/DL (ref 8.3–10.1)
CALCIUM SERPL-MCNC: 8.5 MG/DL (ref 8.3–10.1)
CHLORIDE SERPL-SCNC: 100 MMOL/L (ref 96–108)
CLARITY UR: ABNORMAL
CO2 SERPL-SCNC: 25 MMOL/L (ref 21–32)
COLOR UR: YELLOW
CREAT SERPL-MCNC: 1.05 MG/DL (ref 0.6–1.3)
EOSINOPHIL # BLD AUTO: 0.04 THOUSAND/ÂΜL (ref 0–0.61)
EOSINOPHIL NFR BLD AUTO: 0 % (ref 0–6)
ERYTHROCYTE [DISTWIDTH] IN BLOOD BY AUTOMATED COUNT: 12.3 % (ref 11.6–15.1)
GFR SERPL CREATININE-BSD FRML MDRD: 57 ML/MIN/1.73SQ M
GLUCOSE SERPL-MCNC: 228 MG/DL (ref 65–140)
GLUCOSE UR STRIP-MCNC: ABNORMAL MG/DL
HCT VFR BLD AUTO: 34.8 % (ref 34.8–46.1)
HGB BLD-MCNC: 11.7 G/DL (ref 11.5–15.4)
HGB UR QL STRIP.AUTO: ABNORMAL
IMM GRANULOCYTES # BLD AUTO: 0.05 THOUSAND/UL (ref 0–0.2)
IMM GRANULOCYTES NFR BLD AUTO: 1 % (ref 0–2)
KETONES UR STRIP-MCNC: ABNORMAL MG/DL
LACTATE SERPL-SCNC: 4 MMOL/L (ref 0.5–2)
LEUKOCYTE ESTERASE UR QL STRIP: ABNORMAL
LYMPHOCYTES # BLD AUTO: 2.08 THOUSANDS/ÂΜL (ref 0.6–4.47)
LYMPHOCYTES NFR BLD AUTO: 20 % (ref 14–44)
MCH RBC QN AUTO: 30.6 PG (ref 26.8–34.3)
MCHC RBC AUTO-ENTMCNC: 33.6 G/DL (ref 31.4–37.4)
MCV RBC AUTO: 91 FL (ref 82–98)
MONOCYTES # BLD AUTO: 0.87 THOUSAND/ÂΜL (ref 0.17–1.22)
MONOCYTES NFR BLD AUTO: 8 % (ref 4–12)
NEUTROPHILS # BLD AUTO: 7.42 THOUSANDS/ÂΜL (ref 1.85–7.62)
NEUTS SEG NFR BLD AUTO: 71 % (ref 43–75)
NITRITE UR QL STRIP: POSITIVE
NON-SQ EPI CELLS URNS QL MICRO: ABNORMAL /HPF
NRBC BLD AUTO-RTO: 0 /100 WBCS
PH UR STRIP.AUTO: 5.5 [PH]
PLATELET # BLD AUTO: 344 THOUSANDS/UL (ref 149–390)
PMV BLD AUTO: 8.6 FL (ref 8.9–12.7)
POTASSIUM SERPL-SCNC: 4.1 MMOL/L (ref 3.5–5.3)
PROT SERPL-MCNC: 6.4 G/DL (ref 6.4–8.4)
PROT UR STRIP-MCNC: ABNORMAL MG/DL
RBC # BLD AUTO: 3.82 MILLION/UL (ref 3.81–5.12)
RBC #/AREA URNS AUTO: ABNORMAL /HPF
SODIUM SERPL-SCNC: 136 MMOL/L (ref 135–147)
SP GR UR STRIP.AUTO: >=1.03 (ref 1–1.03)
UROBILINOGEN UR QL STRIP.AUTO: 0.2 E.U./DL
WBC # BLD AUTO: 10.49 THOUSAND/UL (ref 4.31–10.16)
WBC #/AREA URNS AUTO: ABNORMAL /HPF

## 2022-12-13 RX ORDER — CEPHALEXIN 500 MG/1
500 CAPSULE ORAL EVERY 12 HOURS SCHEDULED
Qty: 20 CAPSULE | Refills: 0 | Status: SHIPPED | OUTPATIENT
Start: 2022-12-13 | End: 2022-12-23

## 2022-12-13 RX ORDER — CEPHALEXIN 250 MG/1
500 CAPSULE ORAL ONCE
Status: COMPLETED | OUTPATIENT
Start: 2022-12-13 | End: 2022-12-13

## 2022-12-13 RX ORDER — CEPHALEXIN 500 MG/1
500 CAPSULE ORAL EVERY 12 HOURS SCHEDULED
Qty: 20 CAPSULE | Refills: 0 | Status: SHIPPED | OUTPATIENT
Start: 2022-12-13 | End: 2022-12-13 | Stop reason: SDUPTHER

## 2022-12-13 RX ADMIN — CEPHALEXIN 500 MG: 250 CAPSULE ORAL at 19:46

## 2022-12-13 NOTE — ED PROVIDER NOTES
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 Taking?    Melatonin 10 MG TABS   Yes No   Sig: Take 20 mg by mouth   OZEMPIC, 0 25 OR 0 5 MG/DOSE, 2 MG/1 5ML SOPN   Yes No   Sig: once a week Patient takes every Saturday   PARoxetine (PAXIL) 10 mg tablet   Yes No   Sig: Take 10 mg by mouth daily   aspirin (ECOTRIN LOW STRENGTH) 81 mg EC tablet   Yes No   Sig: Take 81 mg by mouth daily   cyanocobalamin (VITAMIN B-12) 250 MCG tablet   No No   Sig: Take 1 tablet (250 mcg total) by mouth daily   ferrous sulfate 324 (65 Fe) mg   No No   Sig: Take 1 tablet (324 mg total) by mouth daily before breakfast   metoprolol succinate (TOPROL-XL) 100 mg 24 hr tablet   Yes No   Sig: Take 100 mg by mouth daily   simvastatin (ZOCOR) 20 mg tablet   Yes No   Sig: Take 20 mg by mouth daily at bedtime      Facility-Administered Medications: None       Past Medical History:   Diagnosis Date   • Depression    • Diabetes mellitus ([facility] [address] )    • Fistula    • High cholesterol    • Hypertension        Past Surgical History:   Procedure Laterality Date   • BOWEL RESECTION     • CHOLECYSTECTOMY LAPAROSCOPIC     • HERNIA REPAIR         Family History   Problem Relation Age of Onset   • Heart disease Father      I have reviewed and agree with the history as documented  E-Cigarette/Vaping   • E-Cigarette Use Never User      E-Cigarette/Vaping Substances   • Nicotine No    • THC No    • CBD No    • Flavoring No    • Other No    • Unknown No      Social History     Tobacco Use   • Smoking status: Never   • Smokeless tobacco: Never   Vaping Use   • Vaping Use: Never used   Substance Use Topics   • Alcohol use: Yes     Comment: occasionally   • Drug use: Never       Review of Systems   Constitutional: Negative  HENT: Negative  Eyes: Negative  Respiratory: Negative  Cardiovascular: Negative  Gastrointestinal: Negative  Endocrine: Negative  Genitourinary: Positive for dysuria and hematuria  Musculoskeletal: Negative  Skin: Positive for wound  Allergic/Immunologic: Negative  Neurological: Negative  Hematological: Negative  Psychiatric/Behavioral: Negative  Physical Exam  Physical Exam  Constitutional:       Appearance: She is well-developed  She is obese  HENT:      Head: Normocephalic and atraumatic  Eyes:      Conjunctiva/sclera: Conjunctivae normal       Pupils: Pupils are equal, round, and reactive to light  Cardiovascular:      Rate and Rhythm: Normal rate  Pulmonary:      Effort: Pulmonary effort is normal    Abdominal:      Palpations: Abdomen is soft  Comments: Centrally located on the abdomen near the umbilicus is a large patch of erythematous indurated skin, there is brownish discharge coming from one of the fistulas in this area, there is minimal tenderness, no fluctuance   Genitourinary:     Vagina: Normal  No tenderness or bleeding  Comments: No vaginal bleeding on exam  Musculoskeletal:         General: Normal range of motion  Cervical back: Normal range of motion and neck supple  Skin:     General: Skin is warm and dry  Neurological:      Mental Status: She is alert and oriented to person, place, and time  Vital Signs  ED Triage Vitals [12/13/22 1825]   Temperature Pulse Respirations Blood Pressure SpO2   98 1 °F (36 7 °C) 97 18 (!) 227/107 97 %      Temp src Heart Rate Source Patient Position - Orthostatic VS BP Location FiO2 (%)   -- Monitor Lying Right arm --      Pain Score       --           Vitals:    12/13/22 1825 12/13/22 1956   BP: (!) 227/107 135/67   Pulse: 97    Patient Position - Orthostatic VS: Lying                ED Medications  Medications   cephalexin (KEFLEX) capsule 500 mg (500 mg Oral Given 12/13/22 1946)       Diagnostic Studies  Results Reviewed     Procedure Component Value Units Date/Time    Lactic acid [[id number]]  (Abnormal) Collected: 12/13/22 1908    Lab Status: Final result Specimen: Blood from Arm, Right Updated: 12/13/22 1948     LACTIC ACID 4 0 mmol/L     Narrative:      Result may be elevated if tourniquet was used during collection      Lactic acid 2 Hours [[id number]]     Lab Status: No result Specimen: Blood     Comprehensive metabolic panel [[id number]]  (Abnormal) Collected: 12/13/22 1908    Lab Status: Final result Specimen: Blood from Arm, Right Updated: 12/13/22 1936     Sodium 136 mmol/L      Potassium 4 1 mmol/L      Chloride 100 mmol/L      CO2 25 mmol/L      ANION GAP 11 mmol/L      BUN 17 mg/dL      Creatinine 1 05 mg/dL      Glucose 228 mg/dL      Calcium 8 5 mg/dL      Corrected Calcium 9 2 mg/dL      AST 15 U/L      ALT 19 U/L      Alkaline Phosphatase 79 U/L      Total Protein 6 4 g/dL      Albumin 3 1 g/dL      Total Bilirubin 0 19 mg/dL      eGFR 57 ml/min/1 73sq m     Narrative:      [place] guidelines for Chronic Kidney Disease (CKD):   •  Stage 1 with normal or high GFR (GFR > 90 mL/min/1 73 square meters)  •  Stage 2 Mild CKD (GFR = 60-89 mL/min/1 73 square meters)  •  Stage 3A Moderate CKD (GFR = 45-59 mL/min/1 73 square meters)  •  Stage 3B Moderate CKD (GFR = 30-44 mL/min/1 73 square meters)  •  Stage 4 Severe CKD (GFR = 15-29 mL/min/1 73 square meters)  •  Stage 5 End Stage CKD (GFR <15 mL/min/1 73 square meters)  Note: GFR calculation is accurate only with a steady state creatinine    Urine Microscopic [[id number]]  (Abnormal) Collected: 12/13/22 1910    Lab Status: Final result Specimen: Urine, Clean Catch Updated: 12/13/22 1929     RBC, UA Innumerable /hpf      WBC, UA       Field obscured, unable to enumerate     /hpf     Epithelial Cells       Field obscured, unable to enumerate     /hpf     Bacteria, UA       Field obscured, unable to enumerate     /hpf    Urine culture [[id number]] Collected: 12/13/22 1910    Lab Status:  In process Specimen: Urine, Clean Catch Updated: 12/13/22 1929    UA w Reflex to Microscopic w Reflex to Culture [[id number]]  (Abnormal) Collected: 12/13/22 1910    Lab Status: Final result Specimen: Urine, Clean Catch Updated: 12/13/22 1918     Color, UA Yellow     Clarity, UA Cloudy     Specific Gravity, UA >=1 030     pH, UA 5 5     Leukocytes, UA Small     Nitrite, UA Positive     Protein,  (2+) mg/dl      Glucose,  (1/2%) mg/dl      Ketones, UA Trace mg/dl      Urobilinogen, UA 0 2 E U /dl      Bilirubin, UA Small     Occult Blood, UA Large    CBC and differential [[id number]]  (Abnormal) Collected: 12/13/22 1908    Lab Status: Final result Specimen: Blood from Arm, Right Updated: 12/13/22 1917     WBC 10 49 Thousand/uL      RBC 3 82 Million/uL      Hemoglobin 11 7 g/dL      Hematocrit 34 8 %      MCV 91 fL      MCH 30 6 pg      MCHC 33 6 g/dL      RDW 12 3 %      MPV 8 6 fL      Platelets 248 Thousands/uL      nRBC 0 /100 WBCs      Neutrophils Relative 71 %      Immat GRANS % 1 %      Lymphocytes Relative 20 %      Monocytes Relative 8 %      Eosinophils Relative 0 %      Basophils Relative 0 %      Neutrophils Absolute 7 42 Thousands/µL      Immature Grans Absolute 0 05 Thousand/uL      Lymphocytes Absolute 2 08 Thousands/µL      Monocytes Absolute 0 87 Thousand/µL      Eosinophils Absolute 0 04 Thousand/µL      Basophils Absolute 0 03 Thousands/µL     Blood culture #1 [[id number]] Collected: 12/13/22 1908    Lab Status: In process Specimen: Blood from Arm, Right Updated: 12/13/22 1914    Blood culture #2 [[id number]] Collected: 12/13/22 1900    Lab Status:  In process Specimen: Blood from Hand, Right Updated: 12/13/22 1914                 No orders to display              Procedures  Procedures         ED Course                                             MDM  Number of Diagnoses or Management Options  UTI (urinary tract infection): new and requires workup  Diagnosis management comments: Patient presents for possible vaginal bleeding, noted blood on toilet paper after urinating, on vaginal exam she has no blood in the vagina, she does however have evidence of a urinary tract infection with hematuria, therefore started on oral antibiotics, advise close follow-up with her surgeon who she is seen for abdominal wall fistulas, advised to return if symptoms worsen or any additional concerns, patient acknowledges understanding and agreement with this plan       Amount and/or Complexity of Data Reviewed  Clinical lab tests: ordered and reviewed    Risk of Complications, Morbidity, and/or Mortality  Presenting problems: moderate  Diagnostic procedures: moderate  Management options: moderate    Patient Progress  Patient progress: stable      Disposition  Final diagnoses:   UTI (urinary tract infection)     Time reflects when diagnosis was documented in both MDM as applicable and the Disposition within this note     Time User Action Codes Description Comment    12/13/2022  7:55 PM Kelly Cox Add [N39 0] UTI (urinary tract infection)       ED Disposition     ED Disposition   Discharge    Condition   Stable    Date/Time   Tue Dec 13, 2022  7:55 PM    Comment   Cherelle moore to home/self care  Follow-up Information     Follow up With Specialties Details Why Contact Info    Merline Dsouza MD Family Medicine In 2 days As needed [address] [facility] [address]ika [address]            Discharge Medication List as of 12/13/2022  7:56 PM      START taking these medications    Details   cephalexin (KEFLEX) 500 mg capsule Take 1 capsule (500 mg total) by mouth every 12 (twelve) hours for 10 days, Starting Tue 12/13/2022, Until Fri 12/23/2022, Normal         CONTINUE these medications which have NOT CHANGED    Details   aspirin (ECOTRIN LOW STRENGTH) 81 mg EC tablet Take 81 mg by mouth daily, Historical Med      cyanocobalamin (VITAMIN B-12) 250 MCG tablet Take 1 tablet (250 mcg total) by mouth daily, Starting Thu 12/5/2019, Normal      ferrous sulfate 324 (65 Fe) mg Take 1 tablet (324 mg total) by mouth daily before breakfast, Starting Thu 12/5/2019, Normal      Melatonin 10 MG TABS Take 20 mg by mouth, Historical Med      metoprolol succinate (TOPROL-XL) 100 mg 24 hr tablet Take 100 mg by mouth daily, Starting Tue 10/1/2019, Historical Med      OZEMPIC, 0 25 OR 0 5 MG/DOSE, 2 MG/1 5ML SOPN once a week Patient takes every Saturday, Starting Wed 1/29/2020, Historical Med      PARoxetine (PAXIL) 10 mg tablet Take 10 mg by mouth daily, Historical Med      simvastatin (ZOCOR) 20 mg tablet Take 20 mg by mouth daily at bedtime, Historical Med             No discharge procedures on file      PDMP Review       Value Time User    PDMP Reviewed  Yes 8/22/2021 11:48 AM Raven Escobar MD          ED Provider  Electronically Signed by           Vaishnavi Deleon DO  12/13/22 0653

## 2022-12-15 LAB
BACTERIA UR CULT: ABNORMAL
BACTERIA UR CULT: ABNORMAL

## 2022-12-18 LAB
ALL TARGETS: NOT DETECTED
BACTERIA BLD CULT: NORMAL
GRAM STN SPEC: ABNORMAL

## 2022-12-20 LAB
ALL TARGETS: NOT DETECTED
BACTERIA BLD CULT: ABNORMAL
GRAM STN SPEC: ABNORMAL

## 2023-06-06 ENCOUNTER — OFFICE VISIT (OUTPATIENT)
Dept: URGENT CARE | Facility: CLINIC | Age: 63
End: 2023-06-06
Payer: COMMERCIAL

## 2023-06-06 VITALS
WEIGHT: 293 LBS | HEART RATE: 124 BPM | RESPIRATION RATE: 18 BRPM | TEMPERATURE: 98.5 F | OXYGEN SATURATION: 97 % | BODY MASS INDEX: 48.82 KG/M2 | HEIGHT: 65 IN | SYSTOLIC BLOOD PRESSURE: 141 MMHG | DIASTOLIC BLOOD PRESSURE: 61 MMHG

## 2023-06-06 DIAGNOSIS — T21.22XA PARTIAL THICKNESS BURN OF ABDOMEN, INITIAL ENCOUNTER: ICD-10-CM

## 2023-06-06 DIAGNOSIS — R30.0 DYSURIA: Primary | ICD-10-CM

## 2023-06-06 DIAGNOSIS — L03.818 CELLULITIS OF OTHER SPECIFIED SITE: ICD-10-CM

## 2023-06-06 LAB
SL AMB  POCT GLUCOSE, UA: NEGATIVE
SL AMB LEUKOCYTE ESTERASE,UA: NEGATIVE
SL AMB POCT BILIRUBIN,UA: NEGATIVE
SL AMB POCT BLOOD,UA: ABNORMAL
SL AMB POCT CLARITY,UA: ABNORMAL
SL AMB POCT COLOR,UA: YELLOW
SL AMB POCT KETONES,UA: NEGATIVE
SL AMB POCT NITRITE,UA: NEGATIVE
SL AMB POCT PH,UA: 6
SL AMB POCT SPECIFIC GRAVITY,UA: 1.02
SL AMB POCT URINE PROTEIN: ABNORMAL
SL AMB POCT UROBILINOGEN: NORMAL

## 2023-06-06 PROCEDURE — 81002 URINALYSIS NONAUTO W/O SCOPE: CPT | Performed by: PHYSICIAN ASSISTANT

## 2023-06-06 PROCEDURE — 99213 OFFICE O/P EST LOW 20 MIN: CPT | Performed by: PHYSICIAN ASSISTANT

## 2023-06-06 PROCEDURE — 87086 URINE CULTURE/COLONY COUNT: CPT | Performed by: PHYSICIAN ASSISTANT

## 2023-06-06 PROCEDURE — S9083 URGENT CARE CENTER GLOBAL: HCPCS | Performed by: PHYSICIAN ASSISTANT

## 2023-06-06 RX ORDER — SULFAMETHOXAZOLE AND TRIMETHOPRIM 800; 160 MG/1; MG/1
1 TABLET ORAL EVERY 12 HOURS SCHEDULED
Qty: 14 TABLET | Refills: 0 | Status: SHIPPED | OUTPATIENT
Start: 2023-06-06 | End: 2023-06-13

## 2023-06-06 RX ORDER — LIRAGLUTIDE 6 MG/ML
INJECTION SUBCUTANEOUS
COMMUNITY
Start: 2023-04-20

## 2023-06-06 NOTE — PROGRESS NOTES
[facility]        NAME: Jai Calderon is a 61 y o  female  : 1960    MRN: [id number]  DATE: 2023  TIME: 3:16 PM    Assessment and Plan   Dysuria [R30 0]  1  Dysuria  POCT urine dip    Urine culture      2  Partial thickness burn of abdomen, initial encounter        3  Cellulitis of other specified site  sulfamethoxazole-trimethoprim (BACTRIM DS) 800-160 mg per tablet            Patient Instructions   Take antibiotic as prescribed  Complete full dose of antibiotics even if symptoms begin to improve or resolve  May use OTC Tylenol for fever  Observe for signs of worsening infection including increased swelling, redness, pain, discharge, fever or chills, or persistent symptoms  Your symptoms should begin to improve over the next couple days  Follow-up with your PCP in 3-5 days if symptoms worsen or do not improve  Go to ER if symptoms become severe  Follow up with PCP in 3-5 days  Proceed to  ER if symptoms worsen  Chief Complaint     Chief Complaint   Patient presents with   • Possible UTI     Pt reports urinary frequency and burning for the past 3 days  Recent discharge from the hospital approx 1 month ago, has been having utis since getting out  History of Present Illness       Patient is a 58-year-old female with significant past medical history of hypertension, hyperlipidemia, diabetes, and recent fistula repair of the abdomen presents the office complaining of dysuria for 3 days  Patient is also complaining of burn to abdomen while using a heating pad  Reports area became a bubble and has since fallen off  There is now a big large wound with scab  Urinary Tract Infection   This is a recurrent problem  The current episode started in the past 7 days  The problem has been gradually worsening  There has been no fever  Associated symptoms include chills and frequency   Pertinent negatives include no discharge, flank pain, hematuria, hesitancy, nausea, urgency or vomiting  She has tried nothing for the symptoms  Review of Systems   Review of Systems   Constitutional: Positive for chills  Negative for fever  Gastrointestinal: Negative for abdominal pain, diarrhea, nausea and vomiting  Genitourinary: Positive for frequency  Negative for decreased urine volume, difficulty urinating, dysuria, enuresis, flank pain, hematuria, hesitancy, pelvic pain, urgency, vaginal bleeding, vaginal discharge and vaginal pain  Skin: Negative for rash           Current Medications       Current Outpatient Medications:   •  aspirin (ECOTRIN LOW STRENGTH) 81 mg EC tablet, Take 81 mg by mouth daily, Disp: , Rfl:   •  cyanocobalamin (VITAMIN B-12) 250 MCG tablet, Take 1 tablet (250 mcg total) by mouth daily, Disp: 30 tablet, Rfl: 0  •  ferrous sulfate 324 (65 Fe) mg, Take 1 tablet (324 mg total) by mouth daily before breakfast, Disp: 30 tablet, Rfl: 0  •  Melatonin 10 MG TABS, Take 20 mg by mouth, Disp: , Rfl:   •  metoprolol succinate (TOPROL-XL) 100 mg 24 hr tablet, Take 100 mg by mouth daily, Disp: , Rfl:   •  PARoxetine (PAXIL) 10 mg tablet, Take 10 mg by mouth daily, Disp: , Rfl:   •  simvastatin (ZOCOR) 20 mg tablet, Take 20 mg by mouth daily at bedtime, Disp: , Rfl:   •  sulfamethoxazole-trimethoprim (BACTRIM DS) 800-160 mg per tablet, Take 1 tablet by mouth every 12 (twelve) hours for 7 days, Disp: 14 tablet, Rfl: 0  •  OZEMPIC, 0 25 OR 0 5 MG/DOSE, 2 MG/1 5ML SOPN, once a week Patient takes every Saturday (Patient not taking: Reported on 6/6/2023), Disp: , Rfl:   •  Victoza injection, , Disp: , Rfl:     Current Allergies     Allergies as of 06/06/2023 - Reviewed 06/06/2023   Allergen Reaction Noted   • Zithromax [azithromycin] Abdominal Pain 04/26/2018   • Penicillins Rash 04/26/2018            The following portions of the patient's history were reviewed and updated as appropriate: allergies, current medications, past family history, past medical history, past social "history, past surgical history and problem list      Past Medical History:   Diagnosis Date   • Depression    • Diabetes mellitus ([address] )    • Fistula    • High cholesterol    • Hypertension        Past Surgical History:   Procedure Laterality Date   • BOWEL RESECTION     • CHOLECYSTECTOMY LAPAROSCOPIC     • HERNIA REPAIR         Family History   Problem Relation Age of Onset   • Heart disease Father          Medications have been verified  Objective   /61   Pulse (!) 124   Temp 98 5 °F (36 9 °C)   Resp 18   Ht 5' 5\" (1 651 m)   Wt (!) 141 kg (310 lb)   SpO2 97%   BMI 51 59 kg/m²   No LMP recorded  Patient is postmenopausal        Physical Exam     Physical Exam  Vitals and nursing note reviewed  Constitutional:       Appearance: Normal appearance  She is well-developed  HENT:      Head: Normocephalic and atraumatic  Right Ear: External ear normal       Left Ear: External ear normal       Nose: Nose normal    Eyes:      General: Lids are normal    Cardiovascular:      Rate and Rhythm: Normal rate and regular rhythm  Pulses: Normal pulses  Heart sounds: Normal heart sounds  No murmur heard  No friction rub  No gallop  Pulmonary:      Effort: Pulmonary effort is normal       Breath sounds: Normal breath sounds  No wheezing, rhonchi or rales  Chest:      Chest wall: No tenderness  Abdominal:      General: Bowel sounds are normal       Palpations: Abdomen is soft  Tenderness: There is no abdominal tenderness  There is no right CVA tenderness or left CVA tenderness  Musculoskeletal:         General: Normal range of motion  Lymphadenopathy:      Cervical: No cervical adenopathy  Skin:     General: Skin is warm and dry  Capillary Refill: Capillary refill takes less than 2 seconds  Findings: Wound (Second-degree partial-thickness burn to abdomen  See image ) present  Neurological:      Mental Status: She is alert         Urine dip:    LEUKOCYTE " ESTERASE,UA NEGATIVE    NITRITE,UA NEGATIVE    SL AMB POCT UROBILINOGEN NORMAL    POCT URINE PROTEIN 30+     PH,UA 6 0    BLOOD,UA TRACE (NON-HEMOLYZED)    SPECIFIC GRAVITY,UA 1 025    KETONES,UA NEGATIVE    BILIRUBIN,UA NEGATIVE    GLUCOSE, UA NEGATIVE     COLOR,UA YELLOW    CLARITY,UA CLOUDY             Abdominal burn

## 2023-06-07 LAB — BACTERIA UR CULT: NORMAL

## 2023-08-31 ENCOUNTER — OFFICE VISIT (OUTPATIENT)
Dept: URGENT CARE | Facility: CLINIC | Age: 63
End: 2023-08-31

## 2023-08-31 VITALS
HEART RATE: 90 BPM | DIASTOLIC BLOOD PRESSURE: 83 MMHG | OXYGEN SATURATION: 96 % | TEMPERATURE: 96.8 F | SYSTOLIC BLOOD PRESSURE: 138 MMHG | WEIGHT: 270 LBS | RESPIRATION RATE: 20 BRPM | BODY MASS INDEX: 46.1 KG/M2 | HEIGHT: 64 IN

## 2023-08-31 DIAGNOSIS — H00.015 HORDEOLUM EXTERNUM OF LEFT LOWER EYELID: Primary | ICD-10-CM

## 2023-08-31 PROCEDURE — 99213 OFFICE O/P EST LOW 20 MIN: CPT | Performed by: PHYSICIAN ASSISTANT

## 2023-08-31 RX ORDER — ERYTHROMYCIN 5 MG/G
0.5 OINTMENT OPHTHALMIC
Qty: 3.5 G | Refills: 0 | Status: SHIPPED | OUTPATIENT
Start: 2023-08-31 | End: 2023-09-07

## 2023-08-31 RX ORDER — TEMAZEPAM 15 MG/1
15-30 CAPSULE ORAL DAILY PRN
COMMUNITY
Start: 2023-07-26

## 2023-08-31 NOTE — PROGRESS NOTES
[facility]        NAME: Terry Sommer is a 61 y.o. female  : 1960    MRN: [id number]  DATE: 2023  TIME: 9:28 AM    Assessment and Plan   Hordeolum externum of left lower eyelid [H00.015]  1. Hordeolum externum of left lower eyelid  erythromycin (ILOTYCIN) ophthalmic ointment            Patient Instructions   Warm compress. Antibiotic eye ointment. Follow up with PCP in 3-5 days. Proceed to  ER if symptoms worsen. Chief Complaint     Chief Complaint   Patient presents with   • Eye Pain     Left eye pain started about 1.5 weeks ago. She thinks she scratched it. States her vision is good. No redness or swelling noted. History of Present Illness       Patient is a 60-year-old female with significant past medical history of hypertension, hyperlipidemia, and diabetes presents the office with no left eye pain for 1.5 weeks. States she feels like there is a scratch on her lower eyelid to the outer aspect. Reports occasional discharge but denies any redness, swelling, itching, photophobia, vision changes. He tried ice with no relief. She does not wear contacts. Review of Systems   Review of Systems   Eyes: Positive for pain and discharge. Negative for photophobia, redness, itching and visual disturbance. Neurological: Negative for headaches.          Current Medications       Current Outpatient Medications:   •  aspirin (ECOTRIN LOW STRENGTH) 81 mg EC tablet, Take 81 mg by mouth daily, Disp: , Rfl:   •  cyanocobalamin (VITAMIN B-12) 250 MCG tablet, Take 1 tablet (250 mcg total) by mouth daily, Disp: 30 tablet, Rfl: 0  •  erythromycin (ILOTYCIN) ophthalmic ointment, Administer 0.5 inches into the left eye daily at bedtime for 7 days, Disp: 3.5 g, Rfl: 0  •  ferrous sulfate 324 (65 Fe) mg, Take 1 tablet (324 mg total) by mouth daily before breakfast, Disp: 30 tablet, Rfl: 0  •  Melatonin 10 MG TABS, Take 20 mg by mouth, Disp: , Rfl:   •  metoprolol succinate (TOPROL-XL) 100 mg 24 hr tablet, Take 100 mg by mouth daily, Disp: , Rfl:   •  PARoxetine (PAXIL) 10 mg tablet, Take 10 mg by mouth daily, Disp: , Rfl:   •  simvastatin (ZOCOR) 20 mg tablet, Take 20 mg by mouth daily at bedtime, Disp: , Rfl:   •  temazepam (RESTORIL) 15 mg capsule, Take 15-30 mg by mouth daily as needed, Disp: , Rfl:   •  Victoza injection, , Disp: , Rfl:   •  OZEMPIC, 0.25 OR 0.5 MG/DOSE, 2 MG/1.5ML SOPN, once a week Patient takes every Saturday (Patient not taking: Reported on 6/6/2023), Disp: , Rfl:     Current Allergies     Allergies as of 08/31/2023 - Reviewed 08/31/2023   Allergen Reaction Noted   • Zithromax [azithromycin] Abdominal Pain 04/26/2018   • Penicillins Rash 04/26/2018            The following portions of the patient's history were reviewed and updated as appropriate: allergies, current medications, past family history, past medical history, past social history, past surgical history and problem list.     Past Medical History:   Diagnosis Date   • Depression    • Diabetes mellitus ([address])    • Fistula    • High cholesterol    • Hypertension        Past Surgical History:   Procedure Laterality Date   • APPENDECTOMY      2023   • BOWEL RESECTION      2023   • CHOLECYSTECTOMY LAPAROSCOPIC     • HERNIA REPAIR      x2       Family History   Problem Relation Age of Onset   • Heart disease Father          Medications have been verified. Objective   /83   Pulse 90   Temp (!) 96.8 °F (36 °C)   Resp 20   Ht 5' 4" (1.626 m)   Wt 122 kg (270 lb)   SpO2 96%   BMI 46.35 kg/m²   No LMP recorded. Patient is postmenopausal.       Physical Exam     Physical Exam  Vitals and nursing note reviewed. Constitutional:       Appearance: She is well-developed. HENT:      Head: Normocephalic and atraumatic.       Right Ear: External ear normal.      Left Ear: External ear normal.      Nose: Nose normal.   Eyes:      General: Lids are normal.         Left eye: Hordeolum (inner outer lower eyelid) present. No foreign body or discharge. Extraocular Movements: Extraocular movements intact. Conjunctiva/sclera: Conjunctivae normal.      Left eye: Left conjunctiva is not injected. Pupils: Pupils are equal, round, and reactive to light. Skin:     General: Skin is warm and dry. Capillary Refill: Capillary refill takes less than 2 seconds. Neurological:      Mental Status: She is alert.

## 2024-10-18 ENCOUNTER — OFFICE VISIT (OUTPATIENT)
Dept: URGENT CARE | Facility: CLINIC | Age: 64
End: 2024-10-18
Payer: COMMERCIAL

## 2024-10-18 VITALS
OXYGEN SATURATION: 99 % | HEART RATE: 96 BPM | WEIGHT: 292 LBS | HEIGHT: 64 IN | BODY MASS INDEX: 49.85 KG/M2 | SYSTOLIC BLOOD PRESSURE: 120 MMHG | RESPIRATION RATE: 18 BRPM | DIASTOLIC BLOOD PRESSURE: 82 MMHG | TEMPERATURE: 99.4 F

## 2024-10-18 DIAGNOSIS — Z20.822 ENCOUNTER FOR LABORATORY TESTING FOR COVID-19 VIRUS: ICD-10-CM

## 2024-10-18 DIAGNOSIS — J01.00 ACUTE MAXILLARY SINUSITIS, RECURRENCE NOT SPECIFIED: Primary | ICD-10-CM

## 2024-10-18 DIAGNOSIS — J40 BRONCHITIS: ICD-10-CM

## 2024-10-18 LAB
SARS-COV-2 AG UPPER RESP QL IA: NEGATIVE
VALID CONTROL: NORMAL

## 2024-10-18 PROCEDURE — S9083 URGENT CARE CENTER GLOBAL: HCPCS | Performed by: PHYSICIAN ASSISTANT

## 2024-10-18 PROCEDURE — 99213 OFFICE O/P EST LOW 20 MIN: CPT | Performed by: PHYSICIAN ASSISTANT

## 2024-10-18 PROCEDURE — 87811 SARS-COV-2 COVID19 W/OPTIC: CPT | Performed by: PHYSICIAN ASSISTANT

## 2024-10-18 RX ORDER — METFORMIN HYDROCHLORIDE 750 MG/1
750 TABLET, EXTENDED RELEASE ORAL
COMMUNITY
Start: 2024-10-08

## 2024-10-18 RX ORDER — CEPHALEXIN 500 MG/1
500 CAPSULE ORAL EVERY 8 HOURS SCHEDULED
Qty: 30 CAPSULE | Refills: 0 | Status: SHIPPED | OUTPATIENT
Start: 2024-10-18 | End: 2024-10-28

## 2024-10-18 RX ORDER — FLUTICASONE PROPIONATE 50 MCG
1 SPRAY, SUSPENSION (ML) NASAL DAILY
Qty: 9.9 ML | Refills: 0 | Status: SHIPPED | OUTPATIENT
Start: 2024-10-18

## 2024-10-18 RX ORDER — BENZONATATE 100 MG/1
100 CAPSULE ORAL 3 TIMES DAILY PRN
Qty: 20 CAPSULE | Refills: 0 | Status: SHIPPED | OUTPATIENT
Start: 2024-10-18

## 2024-10-18 NOTE — PROGRESS NOTES
[facility]        NAME: Amairani Nix is a 64 y.o. female  : 1960    MRN: [id number]  DATE: 2024  TIME: 2:29 PM    There were no vitals taken for this visit.    Assessment and Plan   No primary diagnosis found.  No diagnosis found.      Patient Instructions       Follow up with PCP in 3-5 days.  Proceed to  ER if symptoms worsen.    Chief Complaint   No chief complaint on file.        History of Present Illness       Pt with 5 days nasal congestion sinus pain and cough  some ear pressure          Review of Systems   Review of Systems   Constitutional: Negative.    HENT:  Positive for congestion, sinus pressure and sinus pain.    Eyes: Negative.    Respiratory:  Positive for cough.    Cardiovascular: Negative.    Gastrointestinal: Negative.    Endocrine: Negative.    Genitourinary: Negative.    Musculoskeletal: Negative.    Skin: Negative.    Allergic/Immunologic: Negative.    Neurological: Negative.    Hematological: Negative.    Psychiatric/Behavioral: Negative.     All other systems reviewed and are negative.        Current Medications       Current Outpatient Medications:     aspirin (ECOTRIN LOW STRENGTH) 81 mg EC tablet, Take 81 mg by mouth daily, Disp: , Rfl:     cyanocobalamin (VITAMIN B-12) 250 MCG tablet, Take 1 tablet (250 mcg total) by mouth daily, Disp: 30 tablet, Rfl: 0    ferrous sulfate 324 (65 Fe) mg, Take 1 tablet (324 mg total) by mouth daily before breakfast, Disp: 30 tablet, Rfl: 0    Melatonin 10 MG TABS, Take 20 mg by mouth, Disp: , Rfl:     metoprolol succinate (TOPROL-XL) 100 mg 24 hr tablet, Take 100 mg by mouth daily, Disp: , Rfl:     OZEMPIC, 0.25 OR 0.5 MG/DOSE, 2 MG/1.5ML SOPN, once a week Patient takes every Saturday (Patient not taking: Reported on 2023), Disp: , Rfl:     PARoxetine (PAXIL) 10 mg tablet, Take 10 mg by mouth daily, Disp: , Rfl:     simvastatin (ZOCOR) 20 mg tablet, Take 20 mg by mouth daily at bedtime, Disp: , Rfl:     temazepam  (RESTORIL) 15 mg capsule, Take 15-30 mg by mouth daily as needed, Disp: , Rfl:     Victoza injection, , Disp: , Rfl:     Current Allergies     Allergies as of 10/18/2024 - Reviewed 10/18/2024   Allergen Reaction Noted    Zithromax [azithromycin] Abdominal Pain 04/26/2018    Penicillins Rash 04/26/2018            The following portions of the patient's history were reviewed and updated as appropriate: allergies, current medications, past family history, past medical history, past social history, past surgical history and problem list.     Past Medical History:   Diagnosis Date    Depression     Diabetes mellitus (HCC)     Fistula     High cholesterol     Hypertension        Past Surgical History:   Procedure Laterality Date    APPENDECTOMY      2023    BOWEL RESECTION      2023    CHOLECYSTECTOMY LAPAROSCOPIC      HERNIA REPAIR      x2       Family History   Problem Relation Age of Onset    Heart disease Father          Medications have been verified.        Objective   There were no vitals taken for this visit.       Physical Exam     Physical Exam  Vitals and nursing note reviewed.   Constitutional:       Appearance: Normal appearance. She is normal weight.      Comments: Pt states she did home covid test it was positive 5 days ago    Covid test today in office is negative    HENT:      Head: Normocephalic.      Right Ear: Tympanic membrane, ear canal and external ear normal.      Left Ear: Tympanic membrane, ear canal and external ear normal.      Nose: Congestion and rhinorrhea present.      Comments: Boggy muocsa  max sinus tender to palp  yellow nasal d/c      Mouth/Throat:      Mouth: Mucous membranes are moist.      Pharynx: Oropharynx is clear.   Eyes:      Extraocular Movements: Extraocular movements intact.      Conjunctiva/sclera: Conjunctivae normal.      Pupils: Pupils are equal, round, and reactive to light.   Cardiovascular:      Rate and Rhythm: Normal rate and regular rhythm.      Pulses: Normal  pulses.      Heart sounds: Normal heart sounds.   Pulmonary:      Effort: Pulmonary effort is normal.      Comments: Minor coarse sounds   Abdominal:      Palpations: Abdomen is soft.   Musculoskeletal:         General: Normal range of motion.      Cervical back: Normal range of motion and neck supple.   Skin:     General: Skin is warm.   Neurological:      Mental Status: She is alert and oriented to person, place, and time.

## 2025-04-11 ENCOUNTER — HOSPITAL ENCOUNTER (EMERGENCY)
Facility: HOSPITAL | Age: 65
Discharge: HOME | End: 2025-04-11
Attending: EMERGENCY MEDICINE
Payer: COMMERCIAL

## 2025-04-11 VITALS
HEIGHT: 66 IN | OXYGEN SATURATION: 96 % | RESPIRATION RATE: 18 BRPM | DIASTOLIC BLOOD PRESSURE: 78 MMHG | TEMPERATURE: 97.8 F | WEIGHT: 293 LBS | SYSTOLIC BLOOD PRESSURE: 166 MMHG | BODY MASS INDEX: 47.09 KG/M2 | HEART RATE: 81 BPM

## 2025-04-11 DIAGNOSIS — R19.7 DIARRHEA, UNSPECIFIED TYPE: Primary | ICD-10-CM

## 2025-04-11 DIAGNOSIS — E86.0 DEHYDRATION: ICD-10-CM

## 2025-04-11 LAB
ALBUMIN SERPL BCG-MCNC: 3.8 G/DL (ref 3.5–5)
ALP SERPL-CCNC: 58 U/L (ref 34–104)
ALT SERPL W P-5'-P-CCNC: 15 U/L (ref 7–52)
ANION GAP SERPL CALCULATED.3IONS-SCNC: 14 MMOL/L (ref 4–13)
AST SERPL W P-5'-P-CCNC: 17 U/L (ref 13–39)
BACTERIA UR QL AUTO: ABNORMAL /HPF
BASOPHILS # BLD AUTO: 0.04 THOUSANDS/ÂΜL (ref 0–0.1)
BASOPHILS NFR BLD AUTO: 1 % (ref 0–1)
BILIRUB SERPL-MCNC: 0.3 MG/DL (ref 0.2–1)
BILIRUB UR QL STRIP: NEGATIVE
BUN SERPL-MCNC: 23 MG/DL (ref 5–25)
CALCIUM SERPL-MCNC: 8.7 MG/DL (ref 8.4–10.2)
CHLORIDE SERPL-SCNC: 106 MMOL/L (ref 96–108)
CLARITY UR: CLEAR
CO2 SERPL-SCNC: 18 MMOL/L (ref 21–32)
COLOR UR: YELLOW
CREAT SERPL-MCNC: 0.93 MG/DL (ref 0.6–1.3)
EOSINOPHIL # BLD AUTO: 0.06 THOUSAND/ÂΜL (ref 0–0.61)
EOSINOPHIL NFR BLD AUTO: 1 % (ref 0–6)
ERYTHROCYTE [DISTWIDTH] IN BLOOD BY AUTOMATED COUNT: 12.4 % (ref 11.6–15.1)
GFR SERPL CREATININE-BSD FRML MDRD: 64 ML/MIN/1.73SQ M
GLUCOSE SERPL-MCNC: 195 MG/DL (ref 65–140)
GLUCOSE UR STRIP-MCNC: NEGATIVE MG/DL
HCT VFR BLD AUTO: 36 % (ref 34.8–46.1)
HGB BLD-MCNC: 12.2 G/DL (ref 11.5–15.4)
HGB UR QL STRIP.AUTO: NEGATIVE
HYALINE CASTS #/AREA URNS LPF: ABNORMAL /LPF
IMM GRANULOCYTES # BLD AUTO: 0.03 THOUSAND/UL (ref 0–0.2)
IMM GRANULOCYTES NFR BLD AUTO: 0 % (ref 0–2)
KETONES UR STRIP-MCNC: NEGATIVE MG/DL
LACTATE SERPL-SCNC: 2.6 MMOL/L (ref 0.5–2)
LACTATE SERPL-SCNC: 3.4 MMOL/L (ref 0.5–2)
LEUKOCYTE ESTERASE UR QL STRIP: NEGATIVE
LIPASE SERPL-CCNC: 52 U/L (ref 11–82)
LYMPHOCYTES # BLD AUTO: 2.26 THOUSANDS/ÂΜL (ref 0.6–4.47)
LYMPHOCYTES NFR BLD AUTO: 27 % (ref 14–44)
MCH RBC QN AUTO: 30 PG (ref 26.8–34.3)
MCHC RBC AUTO-ENTMCNC: 33.9 G/DL (ref 31.4–37.4)
MCV RBC AUTO: 89 FL (ref 82–98)
MONOCYTES # BLD AUTO: 0.65 THOUSAND/ÂΜL (ref 0.17–1.22)
MONOCYTES NFR BLD AUTO: 8 % (ref 4–12)
NEUTROPHILS # BLD AUTO: 5.31 THOUSANDS/ÂΜL (ref 1.85–7.62)
NEUTS SEG NFR BLD AUTO: 63 % (ref 43–75)
NITRITE UR QL STRIP: NEGATIVE
NON-SQ EPI CELLS URNS QL MICRO: ABNORMAL /HPF
NRBC BLD AUTO-RTO: 0 /100 WBCS
OTHER STN SPEC: ABNORMAL
PH UR STRIP.AUTO: 6 [PH]
PLATELET # BLD AUTO: 288 THOUSANDS/UL (ref 149–390)
PMV BLD AUTO: 8.8 FL (ref 8.9–12.7)
POTASSIUM SERPL-SCNC: 3.6 MMOL/L (ref 3.5–5.3)
PROT SERPL-MCNC: 6.9 G/DL (ref 6.4–8.4)
PROT UR STRIP-MCNC: ABNORMAL MG/DL
RBC # BLD AUTO: 4.07 MILLION/UL (ref 3.81–5.12)
RBC #/AREA URNS AUTO: ABNORMAL /HPF
SODIUM SERPL-SCNC: 138 MMOL/L (ref 135–147)
SP GR UR STRIP.AUTO: >=1.03 (ref 1–1.03)
UROBILINOGEN UR QL STRIP.AUTO: 0.2 E.U./DL
WBC # BLD AUTO: 8.35 THOUSAND/UL (ref 4.31–10.16)
WBC #/AREA URNS AUTO: ABNORMAL /HPF

## 2025-04-11 PROCEDURE — 81001 URINALYSIS AUTO W/SCOPE: CPT | Performed by: EMERGENCY MEDICINE

## 2025-04-11 PROCEDURE — 99284 EMERGENCY DEPT VISIT MOD MDM: CPT | Performed by: EMERGENCY MEDICINE

## 2025-04-11 PROCEDURE — 87505 NFCT AGENT DETECTION GI: CPT | Performed by: EMERGENCY MEDICINE

## 2025-04-11 PROCEDURE — 83605 ASSAY OF LACTIC ACID: CPT | Performed by: EMERGENCY MEDICINE

## 2025-04-11 PROCEDURE — 83690 ASSAY OF LIPASE: CPT | Performed by: EMERGENCY MEDICINE

## 2025-04-11 PROCEDURE — 85025 COMPLETE CBC W/AUTO DIFF WBC: CPT | Performed by: EMERGENCY MEDICINE

## 2025-04-11 PROCEDURE — 99284 EMERGENCY DEPT VISIT MOD MDM: CPT

## 2025-04-11 PROCEDURE — 36415 COLL VENOUS BLD VENIPUNCTURE: CPT | Performed by: EMERGENCY MEDICINE

## 2025-04-11 PROCEDURE — 96360 HYDRATION IV INFUSION INIT: CPT

## 2025-04-11 PROCEDURE — 80053 COMPREHEN METABOLIC PANEL: CPT | Performed by: EMERGENCY MEDICINE

## 2025-04-11 RX ORDER — LOPERAMIDE HYDROCHLORIDE 2 MG/1
2 CAPSULE ORAL 4 TIMES DAILY PRN
Qty: 12 CAPSULE | Refills: 0 | Status: SHIPPED | OUTPATIENT
Start: 2025-04-11 | End: 2025-04-11

## 2025-04-11 RX ORDER — ONDANSETRON 4 MG/1
4 TABLET, ORALLY DISINTEGRATING ORAL EVERY 6 HOURS PRN
Qty: 20 TABLET | Refills: 0 | Status: SHIPPED | OUTPATIENT
Start: 2025-04-11 | End: 2025-04-11

## 2025-04-11 RX ORDER — LOPERAMIDE HYDROCHLORIDE 2 MG/1
2 CAPSULE ORAL 4 TIMES DAILY PRN
Qty: 12 CAPSULE | Refills: 0 | Status: SHIPPED | OUTPATIENT
Start: 2025-04-11

## 2025-04-11 RX ORDER — ONDANSETRON 4 MG/1
4 TABLET, ORALLY DISINTEGRATING ORAL EVERY 6 HOURS PRN
Qty: 20 TABLET | Refills: 0 | Status: SHIPPED | OUTPATIENT
Start: 2025-04-11

## 2025-04-11 RX ORDER — LOPERAMIDE HYDROCHLORIDE 2 MG/1
2 CAPSULE ORAL ONCE
Status: COMPLETED | OUTPATIENT
Start: 2025-04-11 | End: 2025-04-11

## 2025-04-11 RX ADMIN — SODIUM CHLORIDE 2000 ML: 0.9 INJECTION, SOLUTION INTRAVENOUS at 06:59

## 2025-04-11 RX ADMIN — LOPERAMIDE HYDROCHLORIDE 2 MG: 2 CAPSULE ORAL at 06:59

## 2025-04-11 NOTE — ED PROVIDER NOTES
Time reflects when diagnosis was documented in both MDM as applicable and the Disposition within this note       Time User Action Codes Description Comment    4/11/2025  9:44 AM Pj Sanchez [R19.7] Diarrhea, unspecified type     4/11/2025  9:44 AM Pj Sanchez [E86.0] Dehydration           ED Disposition       ED Disposition   Discharge    Condition   Stable    Date/Time   Fri Apr 11, 2025  9:44 AM    Comment   Amairani Nix discharge to home/self care.                   Assessment & Plan       Medical Decision Making  Patient was seen and evaluated for her presentation as outlined.  Patient at risk for electrolyte disturbance, dehydration, organ dysfunction, urinary tract infection, infectious diarrhea, other.  Testing ordered as shown.  Lactic acid noted to be elevated, improving on repeat after 2 L of fluid.  Given Imodium in the ED.  Only 1 episode of diarrhea in the ED during 3-hour ED stay.  Vital signs within normal limits.  Benign abdomen.  No clear indication for emergent imaging.  Patient feels comfortable going home.  Will prescribe Zofran and Imodium.  Advised primary care follow-up as needed.  Advised that patient will be contacted if stool testing identifies infection requiring treatment.  Patient expressed understanding.  Stable for discharge from the emergency department.  Return precautions reviewed.  All questions answered.    Amount and/or Complexity of Data Reviewed  Labs: ordered.    Risk  Prescription drug management.             Medications   sodium chloride 0.9 % bolus 2,000 mL (0 mL Intravenous Stopped 4/11/25 0823)   loperamide (IMODIUM) capsule 2 mg (2 mg Oral Given 4/11/25 0659)       ED Risk Strat Scores                    No data recorded        SBIRT 22yo+      Flowsheet Row Most Recent Value   Initial Alcohol Screen: US AUDIT-C     1. How often do you have a drink containing alcohol? 0 Filed at: 04/11/2025 0623   2. How many drinks containing alcohol do you have on a  typical day you are drinking?  0 Filed at: 04/11/2025 0623   3b. FEMALE Any Age, or MALE 65+: How often do you have 4 or more drinks on one occassion? 0 Filed at: 04/11/2025 0623   Audit-C Score 0 Filed at: 04/11/2025 0623   TANIA: How many times in the past year have you...    Used an illegal drug or used a prescription medication for non-medical reasons? Never Filed at: 04/11/2025 0623                            History of Present Illness       Chief Complaint   Patient presents with    Diarrhea     Pt c/o of diarrhea for +5 days and vomiting. Pt took anti diarrheals at home w/o mild relief.        Past Medical History:   Diagnosis Date    Depression     Diabetes mellitus (HCC)     Fistula     High cholesterol     Hypertension       Past Surgical History:   Procedure Laterality Date    APPENDECTOMY      2023    BOWEL RESECTION      2023    CHOLECYSTECTOMY LAPAROSCOPIC      HERNIA REPAIR      x3      Family History   Problem Relation Age of Onset    No Known Problems Mother     Heart disease Father       Social History     Tobacco Use    Smoking status: Never    Smokeless tobacco: Never   Vaping Use    Vaping status: Never Used   Substance Use Topics    Alcohol use: Not Currently     Alcohol/week: 1.0 standard drink of alcohol     Types: 1 Shots of liquor per week    Drug use: Never      E-Cigarette/Vaping    E-Cigarette Use Never User       E-Cigarette/Vaping Substances    Nicotine No     THC No     CBD No     Flavoring No     Other No     Unknown No       I have reviewed and agree with the history as documented.     Patient presents to the emergency department complaining of diarrhea for the past 5 days.  Started initially on Sunday with nausea and vomiting that progressed to diarrhea overnight that night.  Reports approximately 8 watery stools daily.  Took some Kaopectate initially with minimal improvement.  Did have some black stools with taking a Kaopectate, none since.  Stool is currently watery and brown.  No  visible blood.  Denies any abdominal pain or fevers.  No urinary symptoms.  No known sick contacts.  A Chinese food on Saturday night, initially thought she might have food poisoning.  Presents to the ED for persistent symptoms.  Expresses concern over staying hydrated.  No other complaints, modifying factors, or associated symptoms.        Review of Systems   All other systems reviewed and are negative.          Objective       ED Triage Vitals [04/11/25 0623]   Temperature Pulse Blood Pressure Respirations SpO2 Patient Position - Orthostatic VS   97.8 °F (36.6 °C) 85 (!) 188/91 18 96 % Lying      Temp Source Heart Rate Source BP Location FiO2 (%) Pain Score    Temporal Monitor Left arm -- No Pain      Vitals      Date and Time Temp Pulse SpO2 Resp BP Pain Score FACES Pain Rating User   04/11/25 0930 -- 79 97 % -- -- -- --    04/11/25 0915 -- 81 98 % -- 166/78 -- --    04/11/25 0900 -- 79 97 % -- -- -- --    04/11/25 0830 -- 82 99 % -- -- -- --    04/11/25 0815 -- 83 98 % -- 186/88 -- --    04/11/25 0800 -- 81 98 % -- 172/78 -- --    04/11/25 0745 -- 82 97 % -- 169/76 -- --    04/11/25 0730 -- 83 95 % -- 163/77 -- --    04/11/25 0715 -- 86 95 % -- 140/71 -- --    04/11/25 0623 97.8 °F (36.6 °C) 85 96 % 18 188/91 No Pain -- MD            Physical Exam  Vitals and nursing note reviewed.   Constitutional:       General: She is not in acute distress.     Appearance: She is well-developed. She is obese.   HENT:      Head: Normocephalic and atraumatic.   Eyes:      Conjunctiva/sclera: Conjunctivae normal.   Cardiovascular:      Rate and Rhythm: Normal rate and regular rhythm.      Heart sounds: No murmur heard.  Pulmonary:      Effort: Pulmonary effort is normal. No respiratory distress.      Breath sounds: Normal breath sounds. No wheezing, rhonchi or rales.   Abdominal:      General: Abdomen is flat. There is no distension.      Palpations: Abdomen is soft.      Tenderness: There is no abdominal  tenderness. There is no guarding or rebound.   Musculoskeletal:         General: No swelling. Normal range of motion.      Cervical back: Neck supple.   Skin:     General: Skin is warm and dry.      Capillary Refill: Capillary refill takes less than 2 seconds.   Neurological:      General: No focal deficit present.      Mental Status: She is alert and oriented to person, place, and time.   Psychiatric:         Mood and Affect: Mood normal.         Behavior: Behavior normal.         Results Reviewed       Procedure Component Value Units Date/Time    Clostridium difficile toxin by PCR with EIA [[id number]] Collected: 04/11/25 0912    Lab Status: In process Specimen: Stool from Per Rectum Updated: 04/11/25 0917    Lactic acid 2 Hours [[id number]]  (Abnormal) Collected: 04/11/25 0834    Lab Status: Final result Specimen: Blood from Arm, Right Updated: 04/11/25 0905     LACTIC ACID 2.6 mmol/L     Narrative:      Result may be elevated if tourniquet was used during collection.    Urine Microscopic [[id number]]  (Abnormal) Collected: 04/11/25 0836    Lab Status: Final result Specimen: Urine, Clean Catch Updated: 04/11/25 0857     RBC, UA 1-2 /hpf      WBC, UA 2-4 /hpf      Epithelial Cells Occasional /hpf      Bacteria, UA Occasional /hpf      Hyaline Casts, UA 2-4 /lpf      OTHER OBSERVATIONS Yeast Cells Present    UA (URINE) with reflex to Scope [[id number]]  (Abnormal) Collected: 04/11/25 0836    Lab Status: Final result Specimen: Urine, Clean Catch Updated: 04/11/25 0845     Color, UA Yellow     Clarity, UA Clear     Specific Gravity, UA >=1.030     pH, UA 6.0     Leukocytes, UA Negative     Nitrite, UA Negative     Protein, UA Trace mg/dl      Glucose, UA Negative mg/dl      Ketones, UA Negative mg/dl      Urobilinogen, UA 0.2 E.U./dl      Bilirubin, UA Negative     Occult Blood, UA Negative    Comprehensive metabolic panel [[id number]]  (Abnormal) Collected: 04/11/25 0644    Lab Status: Final result Specimen: Blood from  Arm, Left Updated: 04/11/25 0726     Sodium 138 mmol/L      Potassium 3.6 mmol/L      Chloride 106 mmol/L      CO2 18 mmol/L      ANION GAP 14 mmol/L      BUN 23 mg/dL      Creatinine 0.93 mg/dL      Glucose 195 mg/dL      Calcium 8.7 mg/dL      AST 17 U/L      ALT 15 U/L      Alkaline Phosphatase 58 U/L      Total Protein 6.9 g/dL      Albumin 3.8 g/dL      Total Bilirubin 0.30 mg/dL      eGFR 64 ml/min/1.73sq m     Narrative:      National Kid[employer] guidelines for Chronic Kidney Disease (CKD):     Stage 1 with normal or high GFR (GFR > 90 mL/min/1.73 square meters)    Stage 2 Mild CKD (GFR = 60-89 mL/min/1.73 square meters)    Stage 3A Moderate CKD (GFR = 45-59 mL/min/1.73 square meters)    Stage 3B Moderate CKD (GFR = 30-44 mL/min/1.73 square meters)    Stage 4 Severe CKD (GFR = 15-29 mL/min/1.73 square meters)    Stage 5 End Stage CKD (GFR <15 mL/min/1.73 square meters)  Note: GFR calculation is accurate only with a steady state creatinine    Lipase [[id number]]  (Normal) Collected: 04/11/25 0644    Lab Status: Final result Specimen: Blood from Arm, Left Updated: 04/11/25 0726     Lipase 52 u/L     Lactic acid, plasma (w/reflex if result > 2.0) [[id number]]  (Abnormal) Collected: 04/11/25 0644    Lab Status: Final result Specimen: Blood from Arm, Left Updated: 04/11/25 0726     LACTIC ACID 3.4 mmol/L     Narrative:      Result may be elevated if tourniquet was used during collection.    Stool Enteric Bacterial Panel by PCR [[id number]] Collected: 04/11/25 0659    Lab Status: In process Specimen: Stool from Rectum Updated: 04/11/25 0716    CBC and differential [[id number]]  (Abnormal) Collected: 04/11/25 0644    Lab Status: Final result Specimen: Blood from Arm, Left Updated: 04/11/25 0650     WBC 8.35 Thousand/uL      RBC 4.07 Million/uL      Hemoglobin 12.2 g/dL      Hematocrit 36.0 %      MCV 89 fL      MCH 30.0 pg      MCHC 33.9 g/dL      RDW 12.4 %      MPV 8.8 fL      Platelets 288 Thousands/uL       nRBC 0 /100 WBCs      Segmented % 63 %      Immature Grans % 0 %      Lymphocytes % 27 %      Monocytes % 8 %      Eosinophils Relative 1 %      Basophils Relative 1 %      Absolute Neutrophils 5.31 Thousands/µL      Absolute Immature Grans 0.03 Thousand/uL      Absolute Lymphocytes 2.26 Thousands/µL      Absolute Monocytes 0.65 Thousand/µL      Eosinophils Absolute 0.06 Thousand/µL      Basophils Absolute 0.04 Thousands/µL             No orders to display       Procedures    ED Medication and Procedure Management   Prior to Admission Medications   Prescriptions Last Dose Informant Patient Reported? Taking?   Melatonin 10 MG TABS   Yes No   Sig: Take 20 mg by mouth   OZEMPIC, 0.25 OR 0.5 MG/DOSE, 2 MG/1.5ML SOPN   Yes No   Sig: once a week Patient takes every Saturday   Patient not taking: Reported on 2023   Omega-3 Fatty Acids (OMEGA 3 500 PO)   Yes No   Sig: Take 1 capsule by mouth 2 (two) times a day   PARoxetine (PAXIL) 10 mg tablet   Yes No   Sig: Take 10 mg by mouth daily   Victoza injection   Yes No   aspirin (ECOTRIN LOW STRENGTH) 81 mg EC tablet   Yes No   Sig: Take 81 mg by mouth daily   benzonatate (TESSALON PERLES) 100 mg capsule   No No   Sig: Take 1 capsule (100 mg total) by mouth 3 (three) times a day as needed for cough   cyanocobalamin (VITAMIN B-12) 250 MCG tablet   No No   Sig: Take 1 tablet (250 mcg total) by mouth daily   ferrous sulfate 324 (65 Fe) mg   No No   Sig: Take 1 tablet (324 mg total) by mouth daily before breakfast   fluticasone (FLONASE) 50 mcg/act nasal spray   No No   Si spray into each nostril daily   metFORMIN (GLUCOPHAGE-XR) 750 mg 24 hr tablet   Yes No   Sig: Take 750 mg by mouth   metoprolol succinate (TOPROL-XL) 100 mg 24 hr tablet   Yes No   Sig: Take 100 mg by mouth daily   simvastatin (ZOCOR) 20 mg tablet   Yes No   Sig: Take 20 mg by mouth daily at bedtime   temazepam (RESTORIL) 15 mg capsule   Yes No   Sig: Take 15-30 mg by mouth daily as needed   Patient  not taking: Reported on 10/18/2024      Facility-Administered Medications: None     Patient's Medications   Discharge Prescriptions    LOPERAMIDE (IMODIUM) 2 MG CAPSULE    Take 1 capsule (2 mg total) by mouth 4 (four) times a day as needed for diarrhea       Start Date: 4/11/2025 End Date: --       Order Dose: 2 mg       Quantity: 12 capsule    Refills: 0    ONDANSETRON (ZOFRAN-ODT) 4 MG DISINTEGRATING TABLET    Take 1 tablet (4 mg total) by mouth every 6 (six) hours as needed for nausea or vomiting       Start Date: 4/11/2025 End Date: --       Order Dose: 4 mg       Quantity: 20 tablet    Refills: 0     No discharge procedures on file.  ED SEPSIS DOCUMENTATION   Time reflects when diagnosis was documented in both MDM as applicable and the Disposition within this note       Time User Action Codes Description Comment    4/11/2025  9:44 AM Pj Sanchez [R19.7] Diarrhea, unspecified type     4/11/2025  9:44 AM Pj Sanchez [E86.0] Dehydration                  Pj Sanchez MD  04/11/25 0946

## 2025-04-11 NOTE — Clinical Note
Amairani Nix was seen and treated in our emergency department on 4/11/2025.    No restrictions            Diagnosis:     Amairani  may return to work on return date.    She may return on this date: 04/12/2025         If you have any questions or concerns, please don't hesitate to call.      Jon Thomas RN    ______________________________           _______________          _______________  Hospital Representative                              Date                                Time

## 2025-04-11 NOTE — ED NOTES
Pt states that the first 3 days of diarrhea stool was black and has since turned brown     Bella Shelley RN  04/11/25 0639

## 2025-04-12 LAB
C COLI+JEJUNI TUF STL QL NAA+PROBE: NEGATIVE
C DIFF TOX GENS STL QL NAA+PROBE: NEGATIVE
EC STX1+STX2 GENES STL QL NAA+PROBE: NEGATIVE
SALMONELLA SP SPAO STL QL NAA+PROBE: NEGATIVE
SHIGELLA SP+EIEC IPAH STL QL NAA+PROBE: NEGATIVE

## 2025-05-05 ENCOUNTER — OFFICE VISIT (OUTPATIENT)
Dept: URGENT CARE | Facility: CLINIC | Age: 65
End: 2025-05-05
Payer: MEDICARE

## 2025-05-05 VITALS
WEIGHT: 293 LBS | SYSTOLIC BLOOD PRESSURE: 136 MMHG | DIASTOLIC BLOOD PRESSURE: 88 MMHG | HEART RATE: 100 BPM | OXYGEN SATURATION: 95 % | RESPIRATION RATE: 12 BRPM | TEMPERATURE: 98 F | BODY MASS INDEX: 50.02 KG/M2 | HEIGHT: 64 IN

## 2025-05-05 DIAGNOSIS — R05.1 ACUTE COUGH: Primary | ICD-10-CM

## 2025-05-05 PROCEDURE — G0463 HOSPITAL OUTPT CLINIC VISIT: HCPCS | Performed by: NURSE PRACTITIONER

## 2025-05-05 PROCEDURE — 99213 OFFICE O/P EST LOW 20 MIN: CPT | Performed by: NURSE PRACTITIONER

## 2025-05-05 RX ORDER — BENZONATATE 200 MG/1
200 CAPSULE ORAL 3 TIMES DAILY PRN
Qty: 20 CAPSULE | Refills: 0 | Status: SHIPPED | OUTPATIENT
Start: 2025-05-05

## 2025-05-05 RX ORDER — EXENATIDE 2 MG/.85ML
INJECTION, SUSPENSION, EXTENDED RELEASE SUBCUTANEOUS
COMMUNITY

## 2025-05-05 NOTE — PATIENT INSTRUCTIONS
"Patient Education     Cough in adults   The Basics   Written by the doctors and editors at [facility]   What is a cough? -- A cough is an important reflex that helps clear out the body's airways. The airways include the windpipe, or \"trachea,\" and the bronchi, which are the tubes that carry air within the lungs. Coughing helps keep people from breathing things into the airways and lungs, which could cause problems (figure 1).  It is normal for people to cough once in a while. But sometimes, a cough is a symptom of an illness or condition.  Some coughs are called \"dry\" coughs, because they don't bring up mucus (phlegm). Other coughs are called \"wet\" or \"productive\" coughs, because they do bring up mucus. Some coughs are mild and don't cause serious problems. Other coughs are severe and can cause trouble breathing.  What causes a cough? -- In adults, common causes of a cough include:   Viral infections - These include the common cold, the flu, and COVID-19. Usually, a cough caused by a viral infection will only last for a week or 2, but sometimes, it can last longer.   Smoking cigarettes or vaping   Postnasal drip - Postnasal drip is when mucus from the nose drips down or flows along the back of the throat. Postnasal drip can happen when people have:   A cold   Allergies   A sinus infection   Lung conditions - Lung problems like asthma and chronic obstructive pulmonary disease (\"COPD\") can make it hard to breathe. COPD is usually caused by smoking.   Acid reflux - Acid reflux is when the acid that is normally in your stomach backs up into your esophagus (the tube that carries food from your mouth to your stomach).   A side effect from blood pressure medicines called \"ACE inhibitors\"  Will I need tests? -- Maybe. If you see a doctor for your cough, they will talk with you and do an exam. Based on your symptoms and other factors, they might decide that you need tests. These might include:   A swab from your inside of your " "nose - This can be tested for the virus that causes COVID-19.   A chest X-ray   Breathing tests - Breathing tests involve breathing hard into a tube. These tests show how your lungs are working.   Allergy skin tests to find out what you're allergic to - For a skin test, the doctor puts a drop of the substance that you might be allergic to on your skin and makes a tiny prick in your skin. Then, they watch your skin to see if it gets red and bumpy.   A CT scan of your chest or sinuses - A CT scan is an imaging test that creates pictures of the inside of the body.   Lab tests on a sample of the mucus that you cough up   Using a \"scope\" to look inside of your nose, sinuses, airway, or lungs   Tests to check for acid reflux - These usually involve having a thin tube put in your mouth and down into your esophagus.  How can I care for myself at home?    If your cough is from a cold, you can use a cool mist humidifier in your bedroom.   Suck on cough drops or hard candy.   Drink warm liquids, like tea, to soothe your throat.   Avoid smoking and places where other people are smoking.   If you have allergies, avoid the things that you are allergic to. This might include pollen, dust, animals, or mold.   If you are coughing up mucus, try an over-the-counter cold and cough medicine. These medicines can thin mucus and sometimes reduce the urge to cough.   If you have acid reflux, your doctor or nurse will talk to you about how to reduce symptoms.  How is a cough treated? -- Treatment depends on the cause of your cough. For example:   Some infections are treated with antibiotic medicines. If an infection is caused by bacteria, doctors can treat it with antibiotics. If the infection is caused by a virus (such as the common cold), antibiotics will not help. For some viral infections, like the flu or COVID-19, there might be other medicines that can help.   Postnasal drip is treated with different kinds of medicines that can come as " "a pill or nose spray.   Asthma and COPD are usually treated with medicines that people breathe into their lungs. These are called \"inhaler medicines.\"   Acid reflux can be treated with medicine to reduce or block stomach acid.   If you have a cough as a side effect from an ACE inhibitor, your doctor can switch your medicine.  If the cause of your cough is not clear, your doctor might prescribe medicine to make your cough less severe. But these medicines have side effects, and doctors usually recommend them only if nothing else has worked.  When should I call the doctor? -- Call your doctor or nurse if:   You have trouble breathing or noisy breathing (wheezing).   You have a fever or chest pain.   You cough up blood, or yellow or green mucus.   You cough so hard that it makes you throw up.   Your cough gets worse or lasts longer than 14 days.   You have a cough and have lost weight without trying to.  All topics are updated as new evidence becomes available and our peer review process is complete.  This topic retrieved from [employer] on: Feb 26, 2024.  Topic 74301 Version 16.0  Release: 32.2.4 - C32.56  © 2024 UpToDate, Inc. and/or its affiliates. All rights reserved.  figure 1: Normal lungs     The lungs sit in the chest, inside the ribcage. They are covered with a thin membrane called the \"pleura.\" The windpipe, or trachea, branches into 2 smaller airways called the left and right \"bronchi.\" The space between the lungs is called the \"mediastinum.\" Lymph nodes are located within and around the lungs and mediastinum.  Graphic 96944 Version 14.0  Consumer Information Use and Disclaimer   Disclaimer: This generalized information is a limited summary of diagnosis, treatment, and/or medication information. It is not meant to be comprehensive and should be used as a tool to help the user understand and/or assess potential diagnostic and treatment options. It does NOT include all information about conditions, treatments, " medications, side effects, or risks that may apply to a specific patient. It is not intended to be medical advice or a substitute for the medical advice, diagnosis, or treatment of a health care provider based on the health care provider's examination and assessment of a patient's specific and unique circumstances. Patients must speak with a health care provider for complete information about their health, medical questions, and treatment options, including any risks or benefits regarding use of medications. This information does not endorse any treatments or medications as safe, effective, or approved for treating a specific patient. UpToDate, Inc. and its affiliates disclaim any warranty or liability relating to this information or the use thereof.The use of this information is governed by the Terms of Use, available at https://www.woltersWebsenseuwer.com/en/know/clinical-effectiveness-terms. 2024© UpToDate, Inc. and its affiliates and/or licensors. All rights reserved.  Copyright   © 2024 UpToDate, Inc. and/or its affiliates. All rights reserved.

## 2025-05-05 NOTE — PROGRESS NOTES
[facility]  Name: Amairani Nix      : 1960      MRN: [id number]  Encounter Provider: YAMILE Boone  Encounter Date: 2025   Encounter department: [facility] [place]  :  Assessment & Plan  Acute cough    Orders:    benzonatate (TESSALON) 200 MG capsule; Take 1 capsule (200 mg total) by mouth 3 (three) times a day as needed for cough        Patient Instructions  Increase fluid intake and get plenty of rest.       Please start medication which was sent to your pharmacy.       If you have nasal congestions, post nasal drip, sinus pressure, runny nose you may try the following:        Clearing your sinuses in a nice steamy shower or in bathroom filled with steamy air.     Nasal saline rinses every 1-2 hours while awake may also help decrease nasal congestion, drainage.       If you have sore / scratch / irritated throat, you may try the following:         Please note that a cough is not necessarily a bad thing.  It is the body's way of protecting the airways.  If a cough is keeping you from sleeping at night, you may use cough suppressant such as Delsym Cough Syrup, NyQuil, Robitussin.       Avoid products with D or DM if you have a history of hypertension or elevated blood pressure.     Most upper respiratory symptoms start to improve after 7-10 days but may take a few weeks to completely resolve.     You may also use Ibuprofen or Acetaminophen containing product for symptom relief.   The patient verbalized understanding of treatment plan.     Follow up with PCP in 3-5 days.  Proceed to  ER if symptoms worsen.    If tests are performed, our office will contact you with results only if changes need to made to the care plan discussed with you at the visit. [facility].    Chief Complaint:   Chief Complaint   Patient presents with    Cough     Starting 6 days ago.      History of Present Illness   64 yo female pt here today with  concerns of non-productive cough for the past 6 days. She denies change in cough. She denies PND, sore throat, fever, HA, sinus pressure. She has taken emiliana seltzer cough and cold and robitussin which she reports helps.    Cough  This is a new problem. The current episode started in the past 7 days. The problem has been unchanged. The cough is Non-productive. Pertinent negatives include no chest pain, chills, ear congestion, ear pain, fever, headaches, heartburn, hemoptysis, myalgias, nasal congestion, postnasal drip, rash, rhinorrhea, sore throat, shortness of breath, sweats, weight loss or wheezing. Nothing aggravates the symptoms. Treatments tried: alkaseltzer cough and cold and robitussin. The treatment provided moderate relief. There is no history of asthma, bronchiectasis, bronchitis, COPD, emphysema, environmental allergies or pneumonia.         Review of Systems   Constitutional: Negative.  Negative for chills, fever and weight loss.   HENT: Negative.  Negative for congestion, ear discharge, ear pain, postnasal drip, rhinorrhea, sinus pressure, sinus pain, sneezing, sore throat, trouble swallowing and voice change.    Respiratory:  Positive for cough. Negative for apnea, hemoptysis, choking, chest tightness, shortness of breath, wheezing and stridor.    Cardiovascular: Negative.  Negative for chest pain.   Gastrointestinal: Negative.  Negative for abdominal distention, diarrhea, heartburn and nausea.   Musculoskeletal:  Negative for myalgias.   Skin:  Negative for rash.   Allergic/Immunologic: Negative for environmental allergies.   Neurological: Negative.  Negative for headaches.     Past Medical History   Past Medical History:   Diagnosis Date    Depression     Diabetes mellitus (HCC)     Fistula     High cholesterol     Hypertension     Low iron      Past Surgical History:   Procedure Laterality Date    APPENDECTOMY      2023    BOWEL RESECTION      2023    CARDIAC ELECTROPHYSIOLOGY MAPPING AND ABLATION       CHOLECYSTECTOMY LAPAROSCOPIC      HERNIA REPAIR      x3     Family History   Problem Relation Age of Onset    No Known Problems Mother     Heart disease Father      she reports that she has never smoked. She has never used smokeless tobacco. She reports that she does not currently use alcohol after a past usage of about 1.0 standard drink of alcohol per week. She reports that she does not use drugs.  Current Outpatient Medications   Medication Instructions    aspirin (ECOTRIN LOW STRENGTH) 81 mg, Daily    benzonatate (TESSALON PERLES) 100 mg, Oral, 3 times daily PRN    benzonatate (TESSALON) 200 mg, Oral, 3 times daily PRN    Bydureon BCise 2 MG/0.85ML AUIJ INJECT 0.85 ML UNDER THE SKIN EVERY 7 DAYS    cyanocobalamin (VITAMIN B-12) 250 mcg, Oral, Daily    ferrous sulfate 324 mg, Oral, Daily before breakfast    fluticasone (FLONASE) 50 mcg/act nasal spray 1 spray, Nasal, Daily    loperamide (IMODIUM) 2 mg, Oral, 4 times daily PRN    Melatonin 20 mg    metFORMIN (GLUCOPHAGE-XR) 750 mg    metoprolol succinate (TOPROL-XL) 100 mg, Daily    Omega-3 Fatty Acids (OMEGA 3 500 PO) 1 capsule, 2 times daily    ondansetron (ZOFRAN-ODT) 4 mg, Oral, Every 6 hours PRN    OZEMPIC, 0.25 OR 0.5 MG/DOSE, 2 MG/1.5ML SOPN Weekly    PARoxetine (PAXIL) 10 mg, Daily    simvastatin (ZOCOR) 20 mg, Daily at bedtime    temazepam (RESTORIL) 15-30 mg, Daily PRN    Victoza injection No dose, route, or frequency recorded.     Allergies   Allergen Reactions    Semaglutide Anaphylaxis     Throat closes as per the patient    Semaglutide(0.25 Or 0.5mg-Dos) Anaphylaxis    Tetanus-Diphtheria Toxoids Td Other (See Comments)     Numbness LE's--unable to walk    Azithromycin Abdominal Pain    Diphtheria Antitoxin Other (See Comments)    Sulfamethoxazole-Trimethoprim Other (See Comments)    Tetanus Antitoxin Other (See Comments)    Dulaglutide GI Intolerance    Penicillins Rash        Objective   /88   Pulse 100   Temp 98 °F (36.7 °C)   Resp 12   " Ht 5' 4\" (1.626 m)   Wt (!) 137 kg (301 lb 12.8 oz)   SpO2 95%   BMI 51.80 kg/m²      Physical Exam  Vitals and nursing note reviewed.   Constitutional:       General: She is not in acute distress.     Appearance: She is well-developed. She is not ill-appearing, toxic-appearing or diaphoretic.   HENT:      Head: Normocephalic and atraumatic.      Right Ear: Tympanic membrane, ear canal and external ear normal.      Left Ear: Tympanic membrane, ear canal and external ear normal.      Nose: Nose normal. No congestion or rhinorrhea.   Eyes:      Conjunctiva/sclera: Conjunctivae normal.   Cardiovascular:      Rate and Rhythm: Normal rate and regular rhythm.      Heart sounds: No murmur heard.  Pulmonary:      Effort: Pulmonary effort is normal. No respiratory distress.      Breath sounds: Normal breath sounds. No stridor. No wheezing, rhonchi or rales.   Chest:      Chest wall: No tenderness.   Abdominal:      Palpations: Abdomen is soft.      Tenderness: There is no abdominal tenderness.   Musculoskeletal:         General: No swelling.      Cervical back: Neck supple.   Skin:     General: Skin is warm and dry.      Capillary Refill: Capillary refill takes less than 2 seconds.   Neurological:      Mental Status: She is alert.   Psychiatric:         Mood and Affect: Mood normal.         Portions of the record may have been created with voice recognition software.  Occasional wrong word or \"sound a like\" substitutions may have occurred due to the inherent limitations of voice recognition software.  Read the chart carefully and recognize, using context, where substitutions have occurred.  "